# Patient Record
Sex: FEMALE | Race: BLACK OR AFRICAN AMERICAN | NOT HISPANIC OR LATINO | Employment: FULL TIME | URBAN - METROPOLITAN AREA
[De-identification: names, ages, dates, MRNs, and addresses within clinical notes are randomized per-mention and may not be internally consistent; named-entity substitution may affect disease eponyms.]

---

## 2017-02-10 RX ORDER — ZOLPIDEM TARTRATE 10 MG/1
TABLET ORAL
Qty: 21 TABLET | Refills: 0 | Status: SHIPPED | OUTPATIENT
Start: 2017-02-10 | End: 2017-03-02 | Stop reason: SDUPTHER

## 2017-02-10 RX ORDER — PROMETHAZINE HYDROCHLORIDE 25 MG/1
TABLET ORAL
Qty: 20 TABLET | Refills: 0 | Status: SHIPPED | OUTPATIENT
Start: 2017-02-10 | End: 2017-03-21 | Stop reason: SDUPTHER

## 2017-02-10 RX ORDER — AMITRIPTYLINE HYDROCHLORIDE 25 MG/1
TABLET, FILM COATED ORAL
Qty: 21 TABLET | Refills: 0 | Status: SHIPPED | OUTPATIENT
Start: 2017-02-10 | End: 2017-03-02 | Stop reason: SDUPTHER

## 2017-02-20 ENCOUNTER — LAB (OUTPATIENT)
Dept: FAMILY MEDICINE CLINIC | Facility: CLINIC | Age: 32
End: 2017-02-20

## 2017-02-20 DIAGNOSIS — Z00.00 ROUTINE GENERAL MEDICAL EXAMINATION AT A HEALTH CARE FACILITY: Primary | ICD-10-CM

## 2017-02-20 LAB
ALBUMIN SERPL-MCNC: 4.1 G/DL (ref 3.5–5.2)
ALBUMIN/GLOB SERPL: 1.2 G/DL
ALP SERPL-CCNC: 63 U/L (ref 39–117)
ALT SERPL W P-5'-P-CCNC: 32 U/L (ref 1–33)
ANION GAP SERPL CALCULATED.3IONS-SCNC: 11.4 MMOL/L
AST SERPL-CCNC: 19 U/L (ref 1–32)
BACTERIA UR QL AUTO: ABNORMAL /HPF
BILIRUB SERPL-MCNC: 0.2 MG/DL (ref 0.1–1.2)
BILIRUB UR QL STRIP: NEGATIVE
BUN BLD-MCNC: 12 MG/DL (ref 6–20)
BUN/CREAT SERPL: 13.8 (ref 7–25)
CALCIUM SPEC-SCNC: 9.3 MG/DL (ref 8.6–10.5)
CHLORIDE SERPL-SCNC: 102 MMOL/L (ref 98–107)
CHOLEST SERPL-MCNC: 168 MG/DL (ref 0–200)
CLARITY UR: CLEAR
CO2 SERPL-SCNC: 27.6 MMOL/L (ref 22–29)
COLOR UR: YELLOW
CREAT BLD-MCNC: 0.87 MG/DL (ref 0.57–1)
ERYTHROCYTE [DISTWIDTH] IN BLOOD BY AUTOMATED COUNT: 13.4 % (ref 4.5–15)
GFR SERPL CREATININE-BSD FRML MDRD: 91 ML/MIN/1.73
GLOBULIN UR ELPH-MCNC: 3.3 GM/DL
GLUCOSE BLD-MCNC: 98 MG/DL (ref 65–99)
GLUCOSE UR STRIP-MCNC: NEGATIVE MG/DL
HCT VFR BLD AUTO: 37.5 % (ref 31–42)
HDLC SERPL-MCNC: 46 MG/DL (ref 40–60)
HGB BLD-MCNC: 12 G/DL (ref 12–18)
HGB UR QL STRIP.AUTO: NEGATIVE
KETONES UR QL STRIP: NEGATIVE
LDLC SERPL CALC-MCNC: 107 MG/DL (ref 0–100)
LDLC/HDLC SERPL: 2.33 {RATIO}
LEUKOCYTE ESTERASE UR QL STRIP.AUTO: NEGATIVE
LYMPHOCYTES # BLD AUTO: 1.9 10*3/MM3 (ref 1.2–3.4)
LYMPHOCYTES NFR BLD AUTO: 27.8 % (ref 21–51)
MCH RBC QN AUTO: 28 PG (ref 26.1–33.1)
MCHC RBC AUTO-ENTMCNC: 32 G/DL (ref 33–37)
MCV RBC AUTO: 87.6 FL (ref 80–99)
MONOCYTES # BLD AUTO: 0.2 10*3/MM3 (ref 0.1–0.6)
MONOCYTES NFR BLD AUTO: 3.6 % (ref 2–9)
NEUTROPHILS # BLD AUTO: 4.6 10*3/MM3 (ref 1.4–6.5)
NEUTROPHILS NFR BLD AUTO: 68.6 % (ref 42–75)
NITRITE UR QL STRIP: NEGATIVE
PH UR STRIP.AUTO: 7 [PH] (ref 4.6–8)
PLATELET # BLD AUTO: 318 10*3/MM3 (ref 150–450)
PMV BLD AUTO: 8.1 FL (ref 7.1–10.5)
POTASSIUM BLD-SCNC: 4.6 MMOL/L (ref 3.5–5.2)
PROT SERPL-MCNC: 7.4 G/DL (ref 6–8.5)
PROT UR QL STRIP: NEGATIVE
RBC # BLD AUTO: 4.28 10*6/MM3 (ref 4–6)
RBC # UR: ABNORMAL /HPF
REF LAB TEST METHOD: ABNORMAL
SODIUM BLD-SCNC: 141 MMOL/L (ref 136–145)
SP GR UR STRIP: 1.02 (ref 1–1.03)
SQUAMOUS #/AREA URNS HPF: ABNORMAL /HPF
TRIGL SERPL-MCNC: 75 MG/DL (ref 0–150)
TSH SERPL DL<=0.05 MIU/L-ACNC: 1.43 MIU/ML (ref 0.27–4.2)
UROBILINOGEN UR QL STRIP: NORMAL
VLDLC SERPL-MCNC: 15 MG/DL (ref 5–40)
WBC NRBC COR # BLD: 6.7 10*3/MM3 (ref 4.5–10)
WBC UR QL AUTO: ABNORMAL /HPF

## 2017-02-20 PROCEDURE — 85025 COMPLETE CBC W/AUTO DIFF WBC: CPT | Performed by: NURSE PRACTITIONER

## 2017-02-20 PROCEDURE — 84443 ASSAY THYROID STIM HORMONE: CPT | Performed by: NURSE PRACTITIONER

## 2017-02-20 PROCEDURE — 81001 URINALYSIS AUTO W/SCOPE: CPT | Performed by: NURSE PRACTITIONER

## 2017-02-20 PROCEDURE — 80053 COMPREHEN METABOLIC PANEL: CPT | Performed by: NURSE PRACTITIONER

## 2017-02-20 PROCEDURE — 80061 LIPID PANEL: CPT | Performed by: NURSE PRACTITIONER

## 2017-03-02 ENCOUNTER — TELEPHONE (OUTPATIENT)
Dept: FAMILY MEDICINE CLINIC | Facility: CLINIC | Age: 32
End: 2017-03-02

## 2017-03-02 RX ORDER — AMITRIPTYLINE HYDROCHLORIDE 25 MG/1
TABLET, FILM COATED ORAL
Qty: 21 TABLET | Refills: 0 | Status: SHIPPED | OUTPATIENT
Start: 2017-03-02 | End: 2017-03-21 | Stop reason: SDUPTHER

## 2017-03-02 RX ORDER — ZOLPIDEM TARTRATE 10 MG/1
TABLET ORAL
Qty: 21 TABLET | Refills: 0 | Status: SHIPPED | OUTPATIENT
Start: 2017-03-02 | End: 2017-03-21 | Stop reason: SDUPTHER

## 2017-03-21 ENCOUNTER — OFFICE VISIT (OUTPATIENT)
Dept: FAMILY MEDICINE CLINIC | Facility: CLINIC | Age: 32
End: 2017-03-21

## 2017-03-21 VITALS
OXYGEN SATURATION: 99 % | SYSTOLIC BLOOD PRESSURE: 122 MMHG | RESPIRATION RATE: 18 BRPM | HEIGHT: 67 IN | BODY MASS INDEX: 29.91 KG/M2 | WEIGHT: 190.6 LBS | DIASTOLIC BLOOD PRESSURE: 72 MMHG | HEART RATE: 90 BPM | TEMPERATURE: 98.4 F

## 2017-03-21 DIAGNOSIS — K59.04 CHRONIC IDIOPATHIC CONSTIPATION: Primary | ICD-10-CM

## 2017-03-21 DIAGNOSIS — E78.5 DYSLIPIDEMIA: ICD-10-CM

## 2017-03-21 DIAGNOSIS — K25.7 CHRONIC GASTRIC ULCER: ICD-10-CM

## 2017-03-21 DIAGNOSIS — R56.9 SEIZURES (HCC): ICD-10-CM

## 2017-03-21 PROCEDURE — 99213 OFFICE O/P EST LOW 20 MIN: CPT | Performed by: INTERNAL MEDICINE

## 2017-03-21 RX ORDER — ZOLPIDEM TARTRATE 10 MG/1
10 TABLET ORAL NIGHTLY PRN
Qty: 30 TABLET | Refills: 2 | Status: SHIPPED | OUTPATIENT
Start: 2017-03-21 | End: 2017-09-25 | Stop reason: SDUPTHER

## 2017-03-21 RX ORDER — AMITRIPTYLINE HYDROCHLORIDE 25 MG/1
25 TABLET, FILM COATED ORAL NIGHTLY
Qty: 30 TABLET | Refills: 2 | Status: SHIPPED | OUTPATIENT
Start: 2017-03-21 | End: 2017-06-27 | Stop reason: SDUPTHER

## 2017-03-21 RX ORDER — PROMETHAZINE HYDROCHLORIDE 25 MG/1
25 TABLET ORAL EVERY 8 HOURS PRN
Qty: 20 TABLET | Refills: 2 | Status: SHIPPED | OUTPATIENT
Start: 2017-03-21 | End: 2017-07-23 | Stop reason: SDUPTHER

## 2017-03-21 RX ORDER — PANTOPRAZOLE SODIUM 40 MG/1
40 TABLET, DELAYED RELEASE ORAL DAILY
Qty: 30 TABLET | Refills: 3 | Status: SHIPPED | OUTPATIENT
Start: 2017-03-21 | End: 2017-07-23 | Stop reason: SDUPTHER

## 2017-03-21 RX ORDER — LUBIPROSTONE 24 UG/1
24 CAPSULE ORAL 2 TIMES DAILY WITH MEALS
Qty: 30 CAPSULE | Refills: 3 | Status: SHIPPED | OUTPATIENT
Start: 2017-03-21 | End: 2017-08-08

## 2017-03-21 NOTE — PROGRESS NOTES
Subjective   Liya Parker is a 31 y.o. female.     History of Present Illness   Patient was seen today for chronic constipation.  She was placed on amikacin asked to follow-up in 2 weeks.    Much of this encounter note is an electronic transcription/translation of spoken language to printed text.  The electronic translation of spoken language may permit erroneous, or at times, nonsensical words or phrases to be inadvertently transcribed.  Although I have reviewed the note for such errors, some may still exist.  The following portions of the patient's history were reviewed and updated as appropriate: allergies, current medications, past family history, past medical history, past social history, past surgical history and problem list.    Review of Systems   Constitutional: Negative for fatigue and fever.   HENT: Positive for congestion. Negative for trouble swallowing.    Eyes: Negative for discharge and visual disturbance.   Respiratory: Negative for choking and shortness of breath.    Cardiovascular: Negative for chest pain and palpitations.   Gastrointestinal: Positive for constipation. Negative for abdominal pain and blood in stool.   Endocrine: Negative.    Genitourinary: Negative for genital sores and hematuria.   Musculoskeletal: Negative for gait problem and joint swelling.   Skin: Negative for color change, pallor, rash and wound.   Allergic/Immunologic: Positive for environmental allergies. Negative for immunocompromised state.   Neurological: Negative for facial asymmetry and speech difficulty.   Psychiatric/Behavioral: Negative for hallucinations and suicidal ideas.       Objective   Physical Exam   Constitutional: She is oriented to person, place, and time. She appears well-developed and well-nourished.   HENT:   Head: Normocephalic.   Eyes: Conjunctivae are normal. Pupils are equal, round, and reactive to light.   Neck: Normal range of motion. Neck supple.   Cardiovascular: Normal rate, regular rhythm  and normal heart sounds.    Pulmonary/Chest: Effort normal and breath sounds normal.   Abdominal: Soft. Bowel sounds are normal. She exhibits no distension and no mass. There is no tenderness. There is no rebound and no guarding. No hernia.   Musculoskeletal: Normal range of motion.   Neurological: She is alert and oriented to person, place, and time.   Skin: Skin is warm and dry.   Psychiatric: She has a normal mood and affect. Her behavior is normal. Judgment and thought content normal.   Nursing note and vitals reviewed.      Assessment/Plan   Problems Addressed this Visit        Digestive    Gastric ulcer    Relevant Medications    pantoprazole (PROTONIX) 40 MG EC tablet       Nervous and Auditory    Seizures       Other    Dyslipidemia      Other Visit Diagnoses     Chronic idiopathic constipation    -  Primary

## 2017-05-04 RX ORDER — ERGOCALCIFEROL 1.25 MG/1
CAPSULE ORAL
Qty: 16 CAPSULE | Refills: 0 | OUTPATIENT
Start: 2017-05-04

## 2017-06-28 RX ORDER — AMITRIPTYLINE HYDROCHLORIDE 25 MG/1
TABLET, FILM COATED ORAL
Qty: 30 TABLET | Refills: 0 | Status: SHIPPED | OUTPATIENT
Start: 2017-06-28 | End: 2017-07-23 | Stop reason: SDUPTHER

## 2017-07-12 ENCOUNTER — HOSPITAL ENCOUNTER (EMERGENCY)
Facility: HOSPITAL | Age: 32
Discharge: HOME | End: 2017-07-12
Attending: EMERGENCY MEDICINE | Admitting: EMERGENCY MEDICINE

## 2017-07-12 ENCOUNTER — APPOINTMENT (OUTPATIENT)
Dept: GENERAL RADIOLOGY | Facility: HOSPITAL | Age: 32
End: 2017-07-12

## 2017-07-12 ENCOUNTER — APPOINTMENT (OUTPATIENT)
Dept: CT IMAGING | Facility: HOSPITAL | Age: 32
End: 2017-07-12

## 2017-07-12 ENCOUNTER — OFFICE VISIT (OUTPATIENT)
Dept: FAMILY MEDICINE CLINIC | Facility: CLINIC | Age: 32
End: 2017-07-12

## 2017-07-12 VITALS
OXYGEN SATURATION: 98 % | TEMPERATURE: 97.3 F | WEIGHT: 187 LBS | HEIGHT: 67 IN | DIASTOLIC BLOOD PRESSURE: 77 MMHG | SYSTOLIC BLOOD PRESSURE: 132 MMHG | RESPIRATION RATE: 16 BRPM | HEART RATE: 73 BPM | BODY MASS INDEX: 29.35 KG/M2

## 2017-07-12 VITALS
DIASTOLIC BLOOD PRESSURE: 70 MMHG | RESPIRATION RATE: 16 BRPM | HEART RATE: 101 BPM | SYSTOLIC BLOOD PRESSURE: 100 MMHG | OXYGEN SATURATION: 97 % | WEIGHT: 187.4 LBS | TEMPERATURE: 99.3 F | BODY MASS INDEX: 29.41 KG/M2 | HEIGHT: 67 IN

## 2017-07-12 DIAGNOSIS — G43.001 MIGRAINE WITHOUT AURA AND WITH STATUS MIGRAINOSUS, NOT INTRACTABLE: ICD-10-CM

## 2017-07-12 DIAGNOSIS — R56.9 SEIZURE (HCC): ICD-10-CM

## 2017-07-12 DIAGNOSIS — M54.2 NECK PAIN: ICD-10-CM

## 2017-07-12 DIAGNOSIS — S46.912A LEFT SHOULDER STRAIN, INITIAL ENCOUNTER: ICD-10-CM

## 2017-07-12 DIAGNOSIS — R56.9 SEIZURES (HCC): Primary | ICD-10-CM

## 2017-07-12 DIAGNOSIS — G43.009 MIGRAINE WITHOUT AURA AND WITHOUT STATUS MIGRAINOSUS, NOT INTRACTABLE: Primary | ICD-10-CM

## 2017-07-12 DIAGNOSIS — M25.512 ACUTE PAIN OF LEFT SHOULDER: ICD-10-CM

## 2017-07-12 DIAGNOSIS — S16.1XXA CERVICAL STRAIN, INITIAL ENCOUNTER: ICD-10-CM

## 2017-07-12 LAB
ALBUMIN SERPL-MCNC: 4.2 G/DL (ref 3.5–5.2)
ALBUMIN/GLOB SERPL: 1.2 G/DL
ALP SERPL-CCNC: 66 U/L (ref 39–117)
ALT SERPL W P-5'-P-CCNC: 40 U/L (ref 1–33)
ANION GAP SERPL CALCULATED.3IONS-SCNC: 10.2 MMOL/L
AST SERPL-CCNC: 24 U/L (ref 1–32)
BASOPHILS # BLD AUTO: 0.01 10*3/MM3 (ref 0–0.2)
BASOPHILS NFR BLD AUTO: 0.2 % (ref 0–1.5)
BILIRUB SERPL-MCNC: 0.2 MG/DL (ref 0.1–1.2)
BUN BLD-MCNC: 10 MG/DL (ref 6–20)
BUN/CREAT SERPL: 12.8 (ref 7–25)
CALCIUM SPEC-SCNC: 9.4 MG/DL (ref 8.6–10.5)
CHLORIDE SERPL-SCNC: 107 MMOL/L (ref 98–107)
CO2 SERPL-SCNC: 24.8 MMOL/L (ref 22–29)
CREAT BLD-MCNC: 0.78 MG/DL (ref 0.57–1)
DEPRECATED RDW RBC AUTO: 43.9 FL (ref 37–54)
EOSINOPHIL # BLD AUTO: 0.16 10*3/MM3 (ref 0–0.7)
EOSINOPHIL NFR BLD AUTO: 2.4 % (ref 0.3–6.2)
ERYTHROCYTE [DISTWIDTH] IN BLOOD BY AUTOMATED COUNT: 13.8 % (ref 11.7–13)
GFR SERPL CREATININE-BSD FRML MDRD: 103 ML/MIN/1.73
GLOBULIN UR ELPH-MCNC: 3.4 GM/DL
GLUCOSE BLD-MCNC: 88 MG/DL (ref 65–99)
HCT VFR BLD AUTO: 37.7 % (ref 35.6–45.5)
HGB BLD-MCNC: 12.5 G/DL (ref 11.9–15.5)
IMM GRANULOCYTES # BLD: 0.03 10*3/MM3 (ref 0–0.03)
IMM GRANULOCYTES NFR BLD: 0.5 % (ref 0–0.5)
LYMPHOCYTES # BLD AUTO: 1.87 10*3/MM3 (ref 0.9–4.8)
LYMPHOCYTES NFR BLD AUTO: 28.4 % (ref 19.6–45.3)
MAGNESIUM SERPL-MCNC: 2.4 MG/DL (ref 1.6–2.6)
MCH RBC QN AUTO: 28.8 PG (ref 26.9–32)
MCHC RBC AUTO-ENTMCNC: 33.2 G/DL (ref 32.4–36.3)
MCV RBC AUTO: 86.9 FL (ref 80.5–98.2)
MONOCYTES # BLD AUTO: 0.36 10*3/MM3 (ref 0.2–1.2)
MONOCYTES NFR BLD AUTO: 5.5 % (ref 5–12)
NEUTROPHILS # BLD AUTO: 4.15 10*3/MM3 (ref 1.9–8.1)
NEUTROPHILS NFR BLD AUTO: 63 % (ref 42.7–76)
PLATELET # BLD AUTO: 350 10*3/MM3 (ref 140–500)
PMV BLD AUTO: 10 FL (ref 6–12)
POTASSIUM BLD-SCNC: 4.1 MMOL/L (ref 3.5–5.2)
PROT SERPL-MCNC: 7.6 G/DL (ref 6–8.5)
RBC # BLD AUTO: 4.34 10*6/MM3 (ref 3.9–5.2)
SODIUM BLD-SCNC: 142 MMOL/L (ref 136–145)
WBC NRBC COR # BLD: 6.58 10*3/MM3 (ref 4.5–10.7)

## 2017-07-12 PROCEDURE — 70450 CT HEAD/BRAIN W/O DYE: CPT

## 2017-07-12 PROCEDURE — 25010000002 KETOROLAC TROMETHAMINE PER 15 MG: Performed by: EMERGENCY MEDICINE

## 2017-07-12 PROCEDURE — 73030 X-RAY EXAM OF SHOULDER: CPT

## 2017-07-12 PROCEDURE — 96374 THER/PROPH/DIAG INJ IV PUSH: CPT

## 2017-07-12 PROCEDURE — 83735 ASSAY OF MAGNESIUM: CPT | Performed by: EMERGENCY MEDICINE

## 2017-07-12 PROCEDURE — 96375 TX/PRO/DX INJ NEW DRUG ADDON: CPT

## 2017-07-12 PROCEDURE — 25010000002 PROCHLORPERAZINE EDISYLATE PER 10 MG: Performed by: EMERGENCY MEDICINE

## 2017-07-12 PROCEDURE — 99213 OFFICE O/P EST LOW 20 MIN: CPT | Performed by: NURSE PRACTITIONER

## 2017-07-12 PROCEDURE — 85025 COMPLETE CBC W/AUTO DIFF WBC: CPT | Performed by: EMERGENCY MEDICINE

## 2017-07-12 PROCEDURE — 72125 CT NECK SPINE W/O DYE: CPT

## 2017-07-12 PROCEDURE — 99284 EMERGENCY DEPT VISIT MOD MDM: CPT

## 2017-07-12 PROCEDURE — 80053 COMPREHEN METABOLIC PANEL: CPT | Performed by: EMERGENCY MEDICINE

## 2017-07-12 RX ORDER — KETOROLAC TROMETHAMINE 30 MG/ML
30 INJECTION, SOLUTION INTRAMUSCULAR; INTRAVENOUS ONCE
Status: COMPLETED | OUTPATIENT
Start: 2017-07-12 | End: 2017-07-12

## 2017-07-12 RX ORDER — SODIUM CHLORIDE 0.9 % (FLUSH) 0.9 %
10 SYRINGE (ML) INJECTION AS NEEDED
Status: DISCONTINUED | OUTPATIENT
Start: 2017-07-12 | End: 2017-07-12 | Stop reason: HOSPADM

## 2017-07-12 RX ORDER — ESCITALOPRAM OXALATE 20 MG/1
TABLET ORAL
COMMUNITY
Start: 2017-07-07 | End: 2017-08-08

## 2017-07-12 RX ORDER — SUMATRIPTAN 100 MG/1
TABLET, FILM COATED ORAL
COMMUNITY
Start: 2017-04-11 | End: 2017-08-08

## 2017-07-12 RX ORDER — LEVETIRACETAM 1000 MG/1
1000 TABLET ORAL 2 TIMES DAILY
Qty: 60 TABLET | Refills: 0 | Status: SHIPPED | OUTPATIENT
Start: 2017-07-12 | End: 2017-08-08 | Stop reason: SDUPTHER

## 2017-07-12 RX ORDER — BUTALBITAL, ACETAMINOPHEN AND CAFFEINE 50; 325; 40 MG/1; MG/1; MG/1
1 TABLET ORAL EVERY 6 HOURS PRN
Qty: 20 TABLET | Refills: 0 | Status: SHIPPED | OUTPATIENT
Start: 2017-07-12 | End: 2017-07-24 | Stop reason: SDUPTHER

## 2017-07-12 RX ADMIN — KETOROLAC TROMETHAMINE 30 MG: 30 INJECTION, SOLUTION INTRAMUSCULAR at 13:49

## 2017-07-12 RX ADMIN — PROCHLORPERAZINE EDISYLATE 10 MG: 5 INJECTION INTRAMUSCULAR; INTRAVENOUS at 13:49

## 2017-07-12 NOTE — PROGRESS NOTES
Subjective   Liya Parker is a 31 y.o. female.     History of Present Illness   C/o migraines, she has neck and shoulder pain, leg pain, she states she wakes up sweating, she thinks she had a seizure a couple of nights ago, she woke up and her tongue as bleeding, but states she has never had a seizure in her sleep before, she has been agitated and had panic attacks, she states when she gets up the room is spinning, she states she took pain medication at home form wisdom tooth surgery which helped HA but then came back. She takes imitrex and keppra but not helping HA, she tried percocet at home but didn't help long. She sees Dr. Peoples, neurology, last saw about 10 months ago, she is unsure if she has had an MRI of brain. She had EEG about 1 year ago which she states showed seizure activity, she has been working a lot lately, she state she woke up confused and lethargic the morning she woke up with bleeding tongue, she did not go to the ER she went back to sleep. She states seizures started about 4 years ago, has fam hx in father. She states her headache started about 2 weeks ago, she has little appetite, she is photophobic. She called into work for 2 days. She has tried topamax for headache before but did not help. She drove herself here today. She has had a fever lately. She tried tylenol at home, she can't take naprosyn as she has a stomach ulcer. She states she has been taking keppra without missed doses.     The following portions of the patient's history were reviewed and updated as appropriate: allergies, current medications, past family history, past medical history, past social history, past surgical history and problem list.    Review of Systems   Constitutional: Positive for diaphoresis, fatigue and fever. Negative for chills and unexpected weight change.   Eyes: Positive for photophobia. Negative for pain and visual disturbance.   Respiratory: Negative for shortness of breath.    Cardiovascular:  Negative for chest pain.   Musculoskeletal: Positive for arthralgias, myalgias and neck pain.   Skin: Negative for pallor.   Neurological: Positive for seizures and headaches. Negative for dizziness, syncope, speech difficulty, weakness and light-headedness.   Psychiatric/Behavioral: Negative for behavioral problems and confusion. The patient is not nervous/anxious.    All other systems reviewed and are negative.      Objective   Physical Exam   Constitutional: She is oriented to person, place, and time. She appears well-developed and well-nourished.   Appears tired.      HENT:   Head: Normocephalic.   Right Ear: Tympanic membrane and ear canal normal.   Left Ear: Tympanic membrane and ear canal normal.   Eyes: Conjunctivae and EOM are normal. Pupils are equal, round, and reactive to light.   Neck: Normal range of motion and full passive range of motion without pain. Neck supple. Muscular tenderness present.   Cardiovascular: Normal rate, regular rhythm, normal heart sounds and intact distal pulses.    Pulmonary/Chest: Effort normal and breath sounds normal.   Musculoskeletal: Normal range of motion.        Right shoulder: Normal.        Left shoulder: She exhibits tenderness. She exhibits normal range of motion, no bony tenderness, no swelling, no pain, no spasm, normal pulse and normal strength.   Lymphadenopathy:     She has no cervical adenopathy.   Neurological: She is alert and oriented to person, place, and time. She has normal strength. No cranial nerve deficit or sensory deficit. She displays a negative Romberg sign.   Skin: Skin is warm and dry.   Psychiatric: She has a normal mood and affect. Her behavior is normal. Judgment and thought content normal.   Nursing note and vitals reviewed.      Assessment/Plan   Liya was seen today for migraine.    Diagnoses and all orders for this visit:    Seizures  -     Cancel: MRI Brain With & Without Contrast; Future  -     Cancel: MRI Cervical Spine With & Without  Contrast; Future  -     Ambulatory Referral to Neurology    Migraine without aura and with status migrainosus, not intractable  -     Cancel: MRI Brain With & Without Contrast; Future  -     Cancel: MRI Cervical Spine With & Without Contrast; Future  -     Ambulatory Referral to Neurology    Acute pain of left shoulder    Neck pain      Attempted to call neurology offices x3 locations, unable to get patient in to be seen, referral placed for neurology referral and will call with appt.  Recommended patient go to the ER at this time for headache and possible seizures, and for imaging of brain. Report called to [facility] ER. She will f/u in office next week.  Increase fluid intake, get plenty of rest.   Patient agrees with plan of care and understands instructions. Call if worsening symptoms or any problems or concerns.

## 2017-07-12 NOTE — PATIENT INSTRUCTIONS
Attempted to call neurology offices x3 locations, unable to get patient in to be seen, referral placed for neurology referral and will call with appt.  Recommended patient go to the ER at this time for headache and possible seizures, and for imaging of brain. Report called to [facility] ER. She will f/u in office next week.  Increase fluid intake, get plenty of rest.   Patient agrees with plan of care and understands instructions. Call if worsening symptoms or any problems or concerns.

## 2017-07-12 NOTE — ED PROVIDER NOTES
" EMERGENCY DEPARTMENT ENCOUNTER    CHIEF COMPLAINT  Chief Complaint: HA  History given by: Patient  History limited by: Nothing  Room Number: 22/22  PMD: Dionisio Gallegos MD      HPI:  Pt is a 31 y.o. female who presents complaining of intermittent HA onset 2 weeks ago. The pt states she had a seizure 2 days ago. The pt has been on Keppra for 2 years and has not missed a dose. Pt reports insomnia, neck pain, left shoulder pain, nausea, and intermittent BLE numbness and pain. Pt denies CP, SOA, and vomiting. Seen at PCP office today and sent to the ED for evaluation.     Duration: 2 weeks  Onset: Gradual  Timing: Intermittent  Location: Head  Radiation: None  Quality: \"Pain\"  Intensity/Severity: Moderate  Progression: Unchanged  Associated Symptoms: Insomnia, neck pain, left shoulder pain, nausea, intermittent BLE numbness and pain  Aggravating Factors: Nothing  Alleviating Factors: Nothing  Previous Episodes: None  Treatment before arrival: Keppra    PAST MEDICAL HISTORY  Active Ambulatory Problems     Diagnosis Date Noted   • Seizures    • Pancreatitis    • Gastric ulcer    • Dyslipidemia 03/18/2016   • Environmental allergies 03/18/2016     Resolved Ambulatory Problems     Diagnosis Date Noted   • No Resolved Ambulatory Problems     Past Medical History:   Diagnosis Date   • Gastric ulcer    • Pancreatitis    • Seizures        PAST SURGICAL HISTORY  History reviewed. No pertinent surgical history.    FAMILY HISTORY  Family History   Problem Relation Age of Onset   • Diabetes Mother    • Hypertension Mother    • Hypertension Father    • Diabetes Father    • Seizures Father        SOCIAL HISTORY  Social History     Social History   • Marital status: Single     Spouse name: N/A   • Number of children: N/A   • Years of education: N/A     Occupational History   • Not on file.     Social History Main Topics   • Smoking status: Light Tobacco Smoker   • Smokeless tobacco: Not on file   • Alcohol use Yes      Comment: " OCCASIONAL   • Drug use: No   • Sexual activity: Not on file     Other Topics Concern   • Not on file     Social History Narrative       ALLERGIES  Review of patient's allergies indicates no known allergies.    REVIEW OF SYSTEMS  Review of Systems   Constitutional: Negative for chills and fever.   HENT: Negative for sore throat and trouble swallowing.    Eyes: Negative for visual disturbance.   Respiratory: Negative for cough and shortness of breath.    Cardiovascular: Negative for chest pain, palpitations and leg swelling.   Gastrointestinal: Positive for nausea. Negative for abdominal pain, diarrhea and vomiting.   Endocrine: Negative.    Genitourinary: Negative for decreased urine volume, dysuria and frequency.   Musculoskeletal: Positive for neck pain.        Left shoulder pain  Intermittent BLE pain   Skin: Negative for rash.   Allergic/Immunologic: Negative.    Neurological: Positive for numbness (Intermittent BLE ) and headaches. Negative for syncope and weakness.   Hematological: Negative.    Psychiatric/Behavioral: Positive for sleep disturbance (Insomnia).   All other systems reviewed and are negative.      PHYSICAL EXAM  ED Triage Vitals   Temp Heart Rate Resp BP SpO2   07/12/17 1242 07/12/17 1242 07/12/17 1242 07/12/17 1248 07/12/17 1242   98.4 °F (36.9 °C) 97 16 125/94 100 %      Temp src Heart Rate Source Patient Position BP Location FiO2 (%)   07/12/17 1242 -- 07/12/17 1248 -- --   Tympanic  Sitting         Physical Exam   Constitutional: She is oriented to person, place, and time and well-developed, well-nourished, and in no distress. No distress.   HENT:   Head: Normocephalic and atraumatic.   The pt has no bite marks to the tongue.   Eyes: EOM are normal. Pupils are equal, round, and reactive to light.   Neck: Normal range of motion. Neck supple.   The pt has posterior neck tenderness with no step off.   Cardiovascular: Normal rate, regular rhythm and normal heart sounds.    Pulmonary/Chest: Effort  normal and breath sounds normal. No respiratory distress.   Abdominal: Soft. There is no tenderness. There is no rebound and no guarding.   Musculoskeletal: Normal range of motion. She exhibits tenderness (Left shoulder). She exhibits no edema.   Neurological: She is alert and oriented to person, place, and time. She has normal sensation and normal strength.   Skin: Skin is warm and dry. No rash noted.   Psychiatric: Mood and affect normal.   Nursing note and vitals reviewed.      LAB RESULTS  Lab Results (last 24 hours)     Procedure Component Value Units Date/Time    CBC & Differential [[id number]] Collected:  07/12/17 1348    Specimen:  Blood Updated:  07/12/17 1404    Narrative:       The following orders were created for panel order CBC & Differential.  Procedure                               Abnormality         Status                     ---------                               -----------         ------                     CBC Auto Differential[[id number]]        Abnormal            Final result                 Please view results for these tests on the individual orders.    Comprehensive Metabolic Panel [[id number]]  (Abnormal) Collected:  07/12/17 1348    Specimen:  Blood Updated:  07/12/17 1429     Glucose 88 mg/dL      BUN 10 mg/dL      Creatinine 0.78 mg/dL      Sodium 142 mmol/L      Potassium 4.1 mmol/L      Chloride 107 mmol/L      CO2 24.8 mmol/L      Calcium 9.4 mg/dL      Total Protein 7.6 g/dL      Albumin 4.20 g/dL      ALT (SGPT) 40 (H) U/L      AST (SGOT) 24 U/L      Alkaline Phosphatase 66 U/L      Total Bilirubin 0.2 mg/dL      eGFR  African Amer 103 mL/min/1.73      Globulin 3.4 gm/dL      A/G Ratio 1.2 g/dL      BUN/Creatinine Ratio 12.8     Anion Gap 10.2 mmol/L     Magnesium [[id number]]  (Normal) Collected:  07/12/17 1348    Specimen:  Blood Updated:  07/12/17 1429     Magnesium 2.4 mg/dL     CBC Auto Differential [[id number]]  (Abnormal) Collected:  07/12/17 1348    Specimen:  Blood Updated:   07/12/17 1404     WBC 6.58 10*3/mm3      RBC 4.34 10*6/mm3      Hemoglobin 12.5 g/dL      Hematocrit 37.7 %      MCV 86.9 fL      MCH 28.8 pg      MCHC 33.2 g/dL      RDW 13.8 (H) %      RDW-SD 43.9 fl      MPV 10.0 fL      Platelets 350 10*3/mm3      Neutrophil % 63.0 %      Lymphocyte % 28.4 %      Monocyte % 5.5 %      Eosinophil % 2.4 %      Basophil % 0.2 %      Immature Grans % 0.5 %      Neutrophils, Absolute 4.15 10*3/mm3      Lymphocytes, Absolute 1.87 10*3/mm3      Monocytes, Absolute 0.36 10*3/mm3      Eosinophils, Absolute 0.16 10*3/mm3      Basophils, Absolute 0.01 10*3/mm3      Immature Grans, Absolute 0.03 10*3/mm3           I ordered the above labs and reviewed the results    RADIOLOGY  CT Head Without Contrast - Negative acute   CT Cervical Spine Without Contrast - Negative acute   XR Shoulder 2+ View Left - Negative acute        I ordered the above noted radiological studies. Interpreted by radiologist. Discussed with radiologist (Dr. Arguello). Reviewed by me in PACS.       PROCEDURES  Procedures      PROGRESS AND CONSULTS  ED Course     1326  Labs, XR Left Shoulder, and CT C-Spine ordered for further evaluation. Compazine and Toradol ordered.    1440  BP- 124/78 HR- 83 Temp- 98.4 °F (36.9 °C) (Tympanic) O2 sat- 100%    Rechecked pt, she is resting comfortably. Discussed the negative lab and radiology results with the pt. Discussed the plan to discharge the pt with the plan to double her dose of Keppra until she f/u with a neurologist. I advised the pt to f/u with a neurologist. The pt understands and agrees with the plan.    MEDICAL DECISION MAKING  Results were reviewed/discussed with the patient and they were also made aware of online access. Pt also made aware that some labs, such as cultures, will not be resulted during ER visit and follow up with PMD is necessary.     MDM  Number of Diagnoses or Management Options     Amount and/or Complexity of Data Reviewed  Clinical lab tests: ordered and  reviewed (Unremarkable)  Tests in the radiology section of CPT®: ordered and reviewed (CT Head Without Contrast - Negative acute  CT Cervical Spine Without Contrast - Negative acute  XR Shoulder 2+ View Left - Negative acute  )  Discussion of test results with the performing providers: yes (Dr. Arguello)  Discuss the patient with other providers: yes    Patient Progress  Patient progress: stable         DIAGNOSIS  Final diagnoses:   Migraine without aura and without status migrainosus, not intractable   Seizure   Cervical strain, initial encounter   Left shoulder strain, initial encounter       DISPOSITION  DISCHARGE    Patient discharged in stable condition.    Reviewed implications of results, diagnosis, meds, responsibility to follow up, warning signs and symptoms of possible worsening, potential complications and reasons to return to ER.    Patient/Family voiced understanding of above instructions.    Discussed plan for discharge, as there is no emergent indication for admission.  Pt/family is agreeable and understands need for follow up and repeat testing.  Pt is aware that discharge does not mean that nothing is wrong but it indicates no emergency is present that requires admission and they must continue care with follow-up as given below or physician of their choice.     FOLLOW-UP  Dionisio Gallegos Jr., MD  [address] [place], [place] [id number] [phone]    Schedule an appointment as soon as possible for a visit  follow for Neurology referral         Medication List      New Prescriptions          butalbital-acetaminophen-caffeine -40 MG per tablet   Commonly known as:  FIORICET, ESGIC   Take 1 tablet by mouth Every 6 (Six) Hours As Needed for Headache.         Changed          levETIRAcetam 1000 MG tablet   Commonly known as:  KEPPRA   Take 1 tablet by mouth 2 (Two) Times a Day.   What changed:    - medication strength  - how much to take               Latest Documented Vital Signs:  As of 2:45  PM  BP- 124/78 HR- 83 Temp- 98.4 °F (36.9 °C) (Tympanic) O2 sat- 100%    --  Documentation assistance provided by lynn Chakraborty for Dr. Montero.  Information recorded by the scribe was done at my direction and has been verified and validated by me.     Hunter Chakraborty  07/12/17 7251       Xavi Montero MD  07/12/17 8008

## 2017-07-13 ENCOUNTER — TELEPHONE (OUTPATIENT)
Dept: SOCIAL WORK | Facility: HOSPITAL | Age: 32
End: 2017-07-13

## 2017-07-24 ENCOUNTER — TELEPHONE (OUTPATIENT)
Dept: FAMILY MEDICINE CLINIC | Facility: CLINIC | Age: 32
End: 2017-07-24

## 2017-07-24 ENCOUNTER — OFFICE VISIT (OUTPATIENT)
Dept: FAMILY MEDICINE CLINIC | Facility: CLINIC | Age: 32
End: 2017-07-24

## 2017-07-24 VITALS
SYSTOLIC BLOOD PRESSURE: 100 MMHG | WEIGHT: 181.2 LBS | OXYGEN SATURATION: 99 % | HEIGHT: 67 IN | BODY MASS INDEX: 28.44 KG/M2 | TEMPERATURE: 98 F | DIASTOLIC BLOOD PRESSURE: 74 MMHG | RESPIRATION RATE: 16 BRPM | HEART RATE: 84 BPM

## 2017-07-24 DIAGNOSIS — G43.809 OTHER MIGRAINE WITHOUT STATUS MIGRAINOSUS, NOT INTRACTABLE: ICD-10-CM

## 2017-07-24 DIAGNOSIS — R56.9 SEIZURES (HCC): Primary | ICD-10-CM

## 2017-07-24 PROCEDURE — 99213 OFFICE O/P EST LOW 20 MIN: CPT | Performed by: NURSE PRACTITIONER

## 2017-07-24 RX ORDER — ONDANSETRON 4 MG/1
4 TABLET, FILM COATED ORAL EVERY 8 HOURS PRN
Qty: 20 TABLET | Refills: 0 | Status: SHIPPED | OUTPATIENT
Start: 2017-07-24 | End: 2019-03-01 | Stop reason: SDUPTHER

## 2017-07-24 RX ORDER — BUTALBITAL, ACETAMINOPHEN AND CAFFEINE 50; 325; 40 MG/1; MG/1; MG/1
1 TABLET ORAL EVERY 6 HOURS PRN
Qty: 20 TABLET | Refills: 0 | OUTPATIENT
Start: 2017-07-24 | End: 2017-08-08

## 2017-07-24 RX ORDER — PROMETHAZINE HYDROCHLORIDE 25 MG/1
TABLET ORAL
Qty: 20 TABLET | Refills: 0 | Status: SHIPPED | OUTPATIENT
Start: 2017-07-24 | End: 2017-11-09 | Stop reason: SDUPTHER

## 2017-07-24 RX ORDER — AMITRIPTYLINE HYDROCHLORIDE 25 MG/1
TABLET, FILM COATED ORAL
Qty: 30 TABLET | Refills: 0 | Status: SHIPPED | OUTPATIENT
Start: 2017-07-24 | End: 2017-08-21 | Stop reason: SDUPTHER

## 2017-07-24 RX ORDER — PANTOPRAZOLE SODIUM 40 MG/1
TABLET, DELAYED RELEASE ORAL
Qty: 30 TABLET | Refills: 0 | Status: SHIPPED | OUTPATIENT
Start: 2017-07-24 | End: 2017-08-21 | Stop reason: SDUPTHER

## 2017-07-24 NOTE — PATIENT INSTRUCTIONS
Cont neurology appt on 8/8/17.  MRI brain will call with appt. Will defer EEG to neurology.   Trial zofran 4mg every 8 hours as needed for nausea or vomiting. Hold phenergan while taking zofran.   Encourage bland diet, advance as tolerated.  Cont medications as prescribed.   F/u in 1 month with Dr. Gao.   Increase fluid intake, get plenty of rest.   If any seizure activity or prolonged migraine, advised to go to the ER.   FMLA paperwork completed.   Patient agrees with plan of care and understands instructions. Call if worsening symptoms or any problems or concerns.

## 2017-07-24 NOTE — PROGRESS NOTES
"Subjective   Liya Parker is a 31 y.o. female.     History of Present Illness   Here to f/u for seizures, was seen on 7/12/17 for seizures, sent to ER for seizures, unable to get into neurologist, increased keppra to 2000mg 2x day. She was also given fioricet, taking every 6 hours, which helps. She has had some headaches, had 1 yesterday, migraine, gets nauseated, she states she feel at home yesterday, hit knee, did not pass out, she she states she got dizzy. She had to sit down. She states she felt like the room was spinning. She did not take phenergan, she has a hx of ulcer, she states she has not had an appetite. CT of head, neck and left shoulder in ER, all negative. She states last MRI of brain unknown, EEG last year that showed seizure activity, per patient. She states she has been agitated and \"mean\" lately. She states she is depressed, denies suicidal ideations, she states she has been mean since her last seizure a couple of weeks ago. She states she has been \"mean\" before she started Fioricet.   She also brought in [employer] paperwork, she is a surgical tech in labor and delivery, [employer].   The following portions of the patient's history were reviewed and updated as appropriate: allergies, current medications, past family history, past medical history, past social history, past surgical history and problem list.    Review of Systems   Constitutional: Positive for appetite change and fatigue. Negative for chills, diaphoresis and fever.   Respiratory: Negative for shortness of breath.    Cardiovascular: Negative for chest pain.   Musculoskeletal: Positive for neck pain. Negative for arthralgias, myalgias and neck stiffness.   Skin: Negative for pallor.   Neurological: Positive for headaches. Negative for dizziness, tremors, seizures, syncope, speech difficulty, weakness, light-headedness and numbness.   Psychiatric/Behavioral: Negative for behavioral problems, confusion, sleep disturbance and suicidal " ideas. The patient is not nervous/anxious.    All other systems reviewed and are negative.      Objective   Physical Exam   Constitutional: She is oriented to person, place, and time. She appears well-developed and well-nourished.   HENT:   Head: Normocephalic.   Eyes: Conjunctivae and EOM are normal. Pupils are equal, round, and reactive to light.   Neck: Normal range of motion. Neck supple.   Cardiovascular: Normal rate, regular rhythm and normal heart sounds.    Pulmonary/Chest: Effort normal and breath sounds normal.   Musculoskeletal: Normal range of motion.   Lymphadenopathy:     She has no cervical adenopathy.   Neurological: She is alert and oriented to person, place, and time. She has normal strength. No cranial nerve deficit or sensory deficit. She displays a negative Romberg sign.   Skin: Skin is warm and dry.   Psychiatric: She has a normal mood and affect. Her behavior is normal. Judgment and thought content normal.   Nursing note and vitals reviewed.      Assessment/Plan   Liya was seen today for follow-up.    Diagnoses and all orders for this visit:    Seizures  -     MRI Brain With & Without Contrast; Future    Other migraine without status migrainosus, not intractable  -     MRI Brain With & Without Contrast; Future    Other orders  -     ondansetron (ZOFRAN) 4 MG tablet; Take 1 tablet by mouth Every 8 (Eight) Hours As Needed for Nausea or Vomiting.        Cont neurology appt on 8/8/17.  MRI brain will call with appt. Will defer EEG to neurology.   Trial zofran 4mg every 8 hours as needed for nausea or vomiting. Hold phenergan while taking zofran.   Encourage bland diet, advance as tolerated.  Cont medications as prescribed.   F/u in 1 month with Dr. Gao.   Increase fluid intake, get plenty of rest.   If any seizure activity or prolonged migraine, advised to go to the ER.   [place] paperwork completed.   Patient agrees with plan of care and understands instructions. Call if worsening symptoms or  any problems or concerns.

## 2017-07-24 NOTE — TELEPHONE ENCOUNTER
WENT TO ER AND WAS PRESCRIBED BUTALBITAL -40 MG, NEEDS THIS REFLLED. SAW RG TODAY BUT SHE CAN NOT FILL IT.

## 2017-07-24 NOTE — TELEPHONE ENCOUNTER
It is okay to refill as 1 every 6 hours as needed.  Make sure she keeps neurology appointment for early August.  Quantity 20

## 2017-08-02 ENCOUNTER — TELEPHONE (OUTPATIENT)
Dept: FAMILY MEDICINE CLINIC | Facility: CLINIC | Age: 32
End: 2017-08-02

## 2017-08-02 ENCOUNTER — HOSPITAL ENCOUNTER (OUTPATIENT)
Dept: MRI IMAGING | Facility: HOSPITAL | Age: 32
Discharge: HOME | End: 2017-08-02
Admitting: NURSE PRACTITIONER

## 2017-08-02 DIAGNOSIS — G43.809 OTHER MIGRAINE WITHOUT STATUS MIGRAINOSUS, NOT INTRACTABLE: ICD-10-CM

## 2017-08-02 DIAGNOSIS — R56.9 SEIZURES (HCC): ICD-10-CM

## 2017-08-02 PROCEDURE — A9577 INJ MULTIHANCE: HCPCS | Performed by: NURSE PRACTITIONER

## 2017-08-02 PROCEDURE — 0 GADOBENATE DIMEGLUMINE 529 MG/ML SOLUTION: Performed by: NURSE PRACTITIONER

## 2017-08-02 PROCEDURE — 70553 MRI BRAIN STEM W/O & W/DYE: CPT

## 2017-08-02 RX ADMIN — GADOBENATE DIMEGLUMINE 16 ML: 529 INJECTION, SOLUTION INTRAVENOUS at 07:42

## 2017-08-02 NOTE — TELEPHONE ENCOUNTER
----- Message from RAFIQ Gupta sent at 8/2/2017  1:39 PM EDT -----  Please call patient with results.  MRI brain is normal. Cont f/u with neurology. Some ethmoid and maxillary sinus mucosal thickening, does not relate to seizures and HA.    Pt informed of MRI results

## 2017-08-02 NOTE — TELEPHONE ENCOUNTER
----- Message from RAFIQ Gupta sent at 8/2/2017  1:39 PM EDT -----  Please call patient with results.  MRI brain is normal. Cont f/u with neurology. Some ethmoid and maxillary sinus mucosal thickening, does not relate to seizures and HA.    TRC

## 2017-08-08 ENCOUNTER — OFFICE VISIT (OUTPATIENT)
Dept: NEUROLOGY | Facility: CLINIC | Age: 32
End: 2017-08-08

## 2017-08-08 VITALS
SYSTOLIC BLOOD PRESSURE: 118 MMHG | HEIGHT: 67 IN | WEIGHT: 182 LBS | BODY MASS INDEX: 28.56 KG/M2 | DIASTOLIC BLOOD PRESSURE: 80 MMHG

## 2017-08-08 DIAGNOSIS — R56.9 SEIZURES (HCC): ICD-10-CM

## 2017-08-08 DIAGNOSIS — G43.019 INTRACTABLE MIGRAINE WITHOUT AURA AND WITHOUT STATUS MIGRAINOSUS: ICD-10-CM

## 2017-08-08 DIAGNOSIS — R56.9 GENERALIZED CONVULSIVE SEIZURES (HCC): Primary | ICD-10-CM

## 2017-08-08 PROCEDURE — 99244 OFF/OP CNSLTJ NEW/EST MOD 40: CPT | Performed by: PSYCHIATRY & NEUROLOGY

## 2017-08-08 RX ORDER — RIZATRIPTAN BENZOATE 5 MG/1
10 TABLET ORAL ONCE AS NEEDED
Qty: 12 TABLET | Refills: 11 | Status: SHIPPED | OUTPATIENT
Start: 2017-08-08 | End: 2018-05-10 | Stop reason: SDUPTHER

## 2017-08-08 RX ORDER — TOPIRAMATE 50 MG/1
50 TABLET, FILM COATED ORAL NIGHTLY
Qty: 30 TABLET | Refills: 11 | Status: SHIPPED | OUTPATIENT
Start: 2017-08-08 | End: 2020-09-28 | Stop reason: SDUPTHER

## 2017-08-08 RX ORDER — LEVETIRACETAM 1000 MG/1
1000 TABLET ORAL 2 TIMES DAILY
Qty: 60 TABLET | Refills: 11 | Status: SHIPPED | OUTPATIENT
Start: 2017-08-08 | End: 2022-09-01 | Stop reason: DRUGHIGH

## 2017-08-08 RX ORDER — SUMATRIPTAN AND NAPROXEN SODIUM 85; 500 MG/1; MG/1
1 TABLET, FILM COATED ORAL
Qty: 10 TABLET | Refills: 11 | Status: SHIPPED | OUTPATIENT
Start: 2017-08-08 | End: 2017-08-08

## 2017-08-08 RX ORDER — ZOLMITRIPTAN 5 MG/1
5 TABLET, FILM COATED ORAL ONCE AS NEEDED
COMMUNITY
End: 2017-08-08

## 2017-08-08 RX ORDER — LEVETIRACETAM 1000 MG/1
1000 TABLET ORAL 2 TIMES DAILY
Qty: 60 TABLET | Refills: 11 | Status: SHIPPED | OUTPATIENT
Start: 2017-08-08 | End: 2017-08-08 | Stop reason: SDUPTHER

## 2017-08-08 NOTE — PROGRESS NOTES
Subjective:     Patient ID: Liya Parker is a 31 y.o. female.    History of Present Illness     The patient is a 31-year-old right-handed woman who was seen for further evaluation of seizures and migraine. The patient was seen today in consultation per the request of Dr. Gao.  Yenny's had grand mal seizures for about 3 years.  She has been followed in the past by Dr. Peoples.  Those records are not available.  She has had a total of 5 seizures.  The last seizure was 7/11/17.  She went to emergency room at [facility] and Keppra was increased to 1000 mg twice daily.  She is tolerating this.  She also had an MRI the brain at that time was normal.  She's had 2 EEGs in the past.  First was read in 2013 by Dr. Goff  as left temporal sharp wave abnormalities.  The second was read by Dr. Mehta in 2015 is normal.  Patient also has history of migraines of over the same period of time.  When she gets several migraine she will usually have a seizure.  The headache starts in her neck and becomes bitemporal.  Can last all day she has light noise sensitivity and GI upset.  They are severe and intermittent.  The following portions of the patient's history were reviewed and updated as appropriate: allergies, current medications, past family history, past medical history, past social history, past surgical history and problem list.    Review of Systems   Constitutional: Positive for appetite change, chills, diaphoresis, fatigue and unexpected weight change. Negative for activity change and fever.   Eyes: Negative.    Respiratory: Negative.    Cardiovascular: Negative.    Gastrointestinal: Positive for constipation. Negative for abdominal distention, abdominal pain, anal bleeding, blood in stool, diarrhea, nausea, rectal pain and vomiting.   Endocrine: Negative.    Musculoskeletal: Positive for back pain, myalgias, neck pain and neck stiffness. Negative for arthralgias, gait problem and joint swelling.   Skin: Negative.     Allergic/Immunologic: Negative.    Neurological: Positive for dizziness, tremors, seizures, syncope, speech difficulty, weakness, light-headedness, numbness and headaches. Negative for facial asymmetry.   Hematological: Negative.    Psychiatric/Behavioral: Positive for confusion, dysphoric mood and sleep disturbance. Negative for agitation, behavioral problems, decreased concentration, hallucinations, self-injury and suicidal ideas. The patient is nervous/anxious and is hyperactive.       Family History   Problem Relation Age of Onset   • Diabetes Mother    • Hypertension Mother    • Migraines Mother    • Dementia Mother    • Arthritis Mother    • Kidney disease Mother    • Hypertension Father    • Diabetes Father    • Seizures Father    • Stroke Father      Active Ambulatory Problems     Diagnosis Date Noted   • Seizures    • Pancreatitis    • Gastric ulcer    • Dyslipidemia 03/18/2016   • Environmental allergies 03/18/2016   • Intractable migraine without aura and without status migrainosus 08/08/2017     Resolved Ambulatory Problems     Diagnosis Date Noted   • No Resolved Ambulatory Problems     Past Medical History:   Diagnosis Date   • Gastric ulcer    • Headache, tension-type    • Migraine    • Pancreatitis    • Seizures      Social History     Social History   • Marital status: Single     Spouse name: N/A   • Number of children: N/A   • Years of education: N/A     Occupational History   • Not on file.     Social History Main Topics   • Smoking status: Light Tobacco Smoker   • Smokeless tobacco: Not on file   • Alcohol use Yes      Comment: OCCASIONAL   • Drug use: No   • Sexual activity: Not on file     Other Topics Concern   • Not on file     Social History Narrative       Current Outpatient Prescriptions:   •  amitriptyline (ELAVIL) 25 MG tablet, TAKE 1 TABLET BY MOUTH EVERY NIGHT, Disp: 30 tablet, Rfl: 0  •  levETIRAcetam (KEPPRA) 1000 MG tablet, Take 1 tablet by mouth 2 (Two) Times a Day., Disp: 60  tablet, Rfl: 11  •  ondansetron (ZOFRAN) 4 MG tablet, Take 1 tablet by mouth Every 8 (Eight) Hours As Needed for Nausea or Vomiting., Disp: 20 tablet, Rfl: 0  •  pantoprazole (PROTONIX) 40 MG EC tablet, TAKE 1 TABLET BY MOUTH DAILY, Disp: 30 tablet, Rfl: 0  •  promethazine (PHENERGAN) 25 MG tablet, TAKE 1 TABLET BY MOUTH EVERY 8 HOURS AS NEEDED FOR NAUSEA AND VOMITING, Disp: 20 tablet, Rfl: 0  •  zolpidem (AMBIEN) 10 MG tablet, Take 1 tablet by mouth At Night As Needed for Sleep., Disp: 30 tablet, Rfl: 2  •  fluticasone (FLONASE) 50 MCG/ACT nasal spray, 2 sprays into each nostril daily for 30 days. Administer 2 sprays in each nostril for each dose., Disp: 1 each, Rfl: 0  •  rizatriptan (MAXALT) 5 MG tablet, Take 2 tablets by mouth 1 (One) Time As Needed for Migraine. May repeat in 2 hours if needed, Disp: 12 tablet, Rfl: 11  •  topiramate (TOPAMAX) 50 MG tablet, Take 1 tablet by mouth Every Night., Disp: 30 tablet, Rfl: 11      Objective:    Neurologic Exam  Mental status examination showed normal orientation, memory, and speech.  Attention span and concentration were normal.  Fund of knowledge was normal.  Funduscopic showed no abnormality.  Visual fields were full.  Pupillary reflexes were 5 mm, symmetric, and equally reactive to light.  Eye movements were full and conjugate.  Gag reflex was normal.  Hearing was normal.  Muscles of mastication were normal.  No facial weakness was noted.  Shoulder shrug strength was normal bilaterally.  Tongue protrudes midline.  There is no drift of outstretched arms.  Deep tendon reflexes are 2+ and symmetric.  No focal weakness or atrophy was noted.  Tone was normal in all extremities.  No cerebellar signs were noted.  No abnormal movements were noted.  The patient's gait was normal.  No other pathologic reflexes such as Babinski's sign were noted.  No sensory abnormalities were noted.  Physical Exam    Assessment/Plan:     Diagnoses and all orders for this visit:    Generalized  convulsive seizures  -     EEG Awake or Asleep Routine; Future    Seizures    Intractable migraine without aura and without status migrainosus    Other orders  -     Discontinue: SUMAtriptan-naproxen (TREXIMET)  MG per tablet; Take 1 tablet by mouth Every 2 (Two) Hours As Needed for Migraine.  -     levETIRAcetam (KEPPRA) 1000 MG tablet; Take 1 tablet by mouth 2 (Two) Times a Day.  -     rizatriptan (MAXALT) 5 MG tablet; Take 2 tablets by mouth 1 (One) Time As Needed for Migraine. May repeat in 2 hours if needed  -     topiramate (TOPAMAX) 50 MG tablet; Take 1 tablet by mouth Every Night.       Prescription drug management - continue Zomig.  Discontinue butalbital.  Start topiramate 50 mg at night.  Team you Keppra 1000 mg twice daily.    EEG    Eventually would like to transition from Keppra to Topamax    Obtain records from Dr. Peoples.    Phone follow-up in one month.  Follow-up in the office in 2 months    Thank you for allowing me to share in the care of this patient.  Jose Berg M.D.

## 2017-08-15 ENCOUNTER — APPOINTMENT (OUTPATIENT)
Dept: NEUROLOGY | Facility: HOSPITAL | Age: 32
End: 2017-08-15
Attending: PSYCHIATRY & NEUROLOGY

## 2017-08-16 ENCOUNTER — OFFICE VISIT (OUTPATIENT)
Dept: FAMILY MEDICINE CLINIC | Facility: CLINIC | Age: 32
End: 2017-08-16

## 2017-08-16 VITALS
SYSTOLIC BLOOD PRESSURE: 108 MMHG | DIASTOLIC BLOOD PRESSURE: 68 MMHG | HEIGHT: 67 IN | WEIGHT: 176.4 LBS | OXYGEN SATURATION: 98 % | TEMPERATURE: 98.9 F | HEART RATE: 87 BPM | BODY MASS INDEX: 27.69 KG/M2

## 2017-08-16 DIAGNOSIS — M54.2 NECK PAIN: Primary | ICD-10-CM

## 2017-08-16 PROCEDURE — 99213 OFFICE O/P EST LOW 20 MIN: CPT | Performed by: INTERNAL MEDICINE

## 2017-08-16 RX ORDER — ESCITALOPRAM OXALATE 10 MG/1
10 TABLET ORAL DAILY
COMMUNITY
End: 2019-03-01

## 2017-08-16 RX ORDER — TIZANIDINE HYDROCHLORIDE 4 MG/1
4 CAPSULE, GELATIN COATED ORAL 3 TIMES DAILY
Qty: 30 CAPSULE | Refills: 3 | Status: SHIPPED | OUTPATIENT
Start: 2017-08-16 | End: 2017-10-23 | Stop reason: SDUPTHER

## 2017-08-16 NOTE — PROGRESS NOTES
Cong Parker is a 31 y.o. female.     History of Present Illness   She was seen for follow-up for neck pain.  She was able to flex and extend her neck with mild pain only.  Patient felt like she can return to work at this time.  Patient was released to return on Monday.    Much of this encounter note is an electronic transcription/translation of spoken language to printed text.  The electronic translation of spoken language may permit erroneous, or at times, nonsensical words or phrases to be inadvertently transcribed.  Although I have reviewed the note for such errors, some may still exist.  The following portions of the patient's history were reviewed and updated as appropriate: allergies, current medications, past family history, past medical history, past social history, past surgical history and problem list.    Review of Systems   Constitutional: Negative for fatigue and fever.   HENT: Positive for congestion. Negative for trouble swallowing.    Eyes: Negative for discharge and visual disturbance.   Respiratory: Negative for choking and shortness of breath.    Cardiovascular: Negative for chest pain and palpitations.   Gastrointestinal: Negative for abdominal pain and blood in stool.   Endocrine: Negative.    Genitourinary: Negative for genital sores and hematuria.   Musculoskeletal: Positive for neck pain. Negative for gait problem and joint swelling.   Skin: Negative for color change, pallor, rash and wound.   Allergic/Immunologic: Positive for environmental allergies. Negative for immunocompromised state.   Neurological: Negative for facial asymmetry and speech difficulty.   Psychiatric/Behavioral: Negative for hallucinations and suicidal ideas.       Objective   Physical Exam   Constitutional: She is oriented to person, place, and time. She appears well-developed and well-nourished.   HENT:   Head: Normocephalic.   Eyes: Conjunctivae are normal. Pupils are equal, round, and reactive to  light.   Neck: Normal range of motion. Neck supple.   Cardiovascular: Normal rate, regular rhythm and normal heart sounds.    Pulmonary/Chest: Effort normal and breath sounds normal.   Abdominal: Soft. Bowel sounds are normal.   Musculoskeletal: Normal range of motion. She exhibits no edema, tenderness or deformity.   Neurological: She is alert and oriented to person, place, and time.   Skin: Skin is warm and dry.   Psychiatric: She has a normal mood and affect. Her behavior is normal. Judgment and thought content normal.   Nursing note and vitals reviewed.      Assessment/Plan   Problems Addressed this Visit     None      Visit Diagnoses     Neck pain    -  Primary

## 2017-08-22 RX ORDER — PANTOPRAZOLE SODIUM 40 MG/1
TABLET, DELAYED RELEASE ORAL
Qty: 30 TABLET | Refills: 0 | Status: SHIPPED | OUTPATIENT
Start: 2017-08-22 | End: 2017-09-18 | Stop reason: SDUPTHER

## 2017-08-22 RX ORDER — AMITRIPTYLINE HYDROCHLORIDE 25 MG/1
TABLET, FILM COATED ORAL
Qty: 30 TABLET | Refills: 0 | Status: SHIPPED | OUTPATIENT
Start: 2017-08-22 | End: 2017-09-18 | Stop reason: SDUPTHER

## 2017-09-19 RX ORDER — AMITRIPTYLINE HYDROCHLORIDE 25 MG/1
TABLET, FILM COATED ORAL
Qty: 30 TABLET | Refills: 0 | Status: SHIPPED | OUTPATIENT
Start: 2017-09-19 | End: 2017-09-25 | Stop reason: SDUPTHER

## 2017-09-19 RX ORDER — PANTOPRAZOLE SODIUM 40 MG/1
TABLET, DELAYED RELEASE ORAL
Qty: 30 TABLET | Refills: 0 | Status: SHIPPED | OUTPATIENT
Start: 2017-09-19 | End: 2017-11-09 | Stop reason: SDUPTHER

## 2017-09-26 RX ORDER — ZOLPIDEM TARTRATE 10 MG/1
TABLET ORAL
Qty: 30 TABLET | Refills: 0 | Status: SHIPPED | OUTPATIENT
Start: 2017-09-26 | End: 2017-12-23 | Stop reason: SDUPTHER

## 2017-09-26 RX ORDER — AMITRIPTYLINE HYDROCHLORIDE 25 MG/1
TABLET, FILM COATED ORAL
Qty: 30 TABLET | Refills: 0 | Status: SHIPPED | OUTPATIENT
Start: 2017-09-26 | End: 2018-01-18 | Stop reason: SDUPTHER

## 2017-10-23 RX ORDER — TIZANIDINE HYDROCHLORIDE 4 MG/1
CAPSULE, GELATIN COATED ORAL
Qty: 30 CAPSULE | Refills: 0 | Status: SHIPPED | OUTPATIENT
Start: 2017-10-23 | End: 2017-11-09 | Stop reason: SDUPTHER

## 2017-10-23 RX ORDER — LUBIPROSTONE 24 UG/1
CAPSULE, GELATIN COATED ORAL
Qty: 30 CAPSULE | Refills: 0 | Status: SHIPPED | OUTPATIENT
Start: 2017-10-23 | End: 2018-05-10 | Stop reason: SDUPTHER

## 2017-11-09 RX ORDER — TIZANIDINE HYDROCHLORIDE 4 MG/1
CAPSULE, GELATIN COATED ORAL
Qty: 30 CAPSULE | Refills: 0 | Status: SHIPPED | OUTPATIENT
Start: 2017-11-09 | End: 2017-11-25 | Stop reason: SDUPTHER

## 2017-11-09 RX ORDER — PANTOPRAZOLE SODIUM 40 MG/1
TABLET, DELAYED RELEASE ORAL
Qty: 30 TABLET | Refills: 0 | Status: SHIPPED | OUTPATIENT
Start: 2017-11-09 | End: 2017-12-06 | Stop reason: SDUPTHER

## 2017-11-09 RX ORDER — PROMETHAZINE HYDROCHLORIDE 25 MG/1
TABLET ORAL
Qty: 20 TABLET | Refills: 0 | Status: SHIPPED | OUTPATIENT
Start: 2017-11-09 | End: 2018-05-10 | Stop reason: SDUPTHER

## 2017-11-27 RX ORDER — TIZANIDINE HYDROCHLORIDE 4 MG/1
CAPSULE, GELATIN COATED ORAL
Qty: 30 CAPSULE | Refills: 0 | Status: SHIPPED | OUTPATIENT
Start: 2017-11-27 | End: 2017-12-06 | Stop reason: SDUPTHER

## 2017-12-06 RX ORDER — PANTOPRAZOLE SODIUM 40 MG/1
TABLET, DELAYED RELEASE ORAL
Qty: 30 TABLET | Refills: 3 | Status: SHIPPED | OUTPATIENT
Start: 2017-12-06 | End: 2018-05-10 | Stop reason: SDUPTHER

## 2017-12-06 RX ORDER — TIZANIDINE HYDROCHLORIDE 4 MG/1
CAPSULE, GELATIN COATED ORAL
Qty: 30 CAPSULE | Refills: 3 | Status: SHIPPED | OUTPATIENT
Start: 2017-12-06 | End: 2018-03-01 | Stop reason: SDUPTHER

## 2017-12-26 RX ORDER — ZOLPIDEM TARTRATE 10 MG/1
TABLET ORAL
Qty: 30 TABLET | Refills: 0
Start: 2017-12-26 | End: 2018-01-22 | Stop reason: SDUPTHER

## 2018-01-19 RX ORDER — AMITRIPTYLINE HYDROCHLORIDE 25 MG/1
TABLET, FILM COATED ORAL
Qty: 21 TABLET | Refills: 0 | Status: SHIPPED | OUTPATIENT
Start: 2018-01-19 | End: 2018-01-22 | Stop reason: SDUPTHER

## 2018-01-22 RX ORDER — ZOLPIDEM TARTRATE 10 MG/1
TABLET ORAL
Qty: 30 TABLET | Refills: 0 | Status: SHIPPED | OUTPATIENT
Start: 2018-01-22 | End: 2018-01-22 | Stop reason: SDUPTHER

## 2018-01-22 RX ORDER — AMITRIPTYLINE HYDROCHLORIDE 25 MG/1
TABLET, FILM COATED ORAL
Qty: 21 TABLET | Refills: 0 | Status: SHIPPED | OUTPATIENT
Start: 2018-01-22 | End: 2018-03-01 | Stop reason: SDUPTHER

## 2018-01-23 RX ORDER — ZOLPIDEM TARTRATE 10 MG/1
TABLET ORAL
Qty: 14 TABLET | Refills: 0 | OUTPATIENT
Start: 2018-01-23 | End: 2018-03-16 | Stop reason: SDUPTHER

## 2018-02-05 RX ORDER — ZOLPIDEM TARTRATE 10 MG/1
TABLET ORAL
Qty: 14 TABLET | Refills: 0 | OUTPATIENT
Start: 2018-02-05

## 2018-03-01 RX ORDER — AMITRIPTYLINE HYDROCHLORIDE 25 MG/1
TABLET, FILM COATED ORAL
Qty: 21 TABLET | Refills: 0 | Status: SHIPPED | OUTPATIENT
Start: 2018-03-01 | End: 2018-03-16 | Stop reason: SDUPTHER

## 2018-03-01 RX ORDER — TIZANIDINE HYDROCHLORIDE 4 MG/1
CAPSULE, GELATIN COATED ORAL
Qty: 30 CAPSULE | Refills: 0 | Status: SHIPPED | OUTPATIENT
Start: 2018-03-01 | End: 2018-03-22 | Stop reason: SDUPTHER

## 2018-03-16 ENCOUNTER — TELEPHONE (OUTPATIENT)
Dept: FAMILY MEDICINE CLINIC | Facility: CLINIC | Age: 33
End: 2018-03-16

## 2018-03-16 RX ORDER — ZOLPIDEM TARTRATE 10 MG/1
10 TABLET ORAL
Qty: 30 TABLET | Refills: 0 | OUTPATIENT
Start: 2018-03-16 | End: 2018-04-11 | Stop reason: SDUPTHER

## 2018-03-16 RX ORDER — AMITRIPTYLINE HYDROCHLORIDE 25 MG/1
25 TABLET, FILM COATED ORAL NIGHTLY PRN
Qty: 30 TABLET | Refills: 0 | Status: SHIPPED | OUTPATIENT
Start: 2018-03-16 | End: 2018-04-11 | Stop reason: SDUPTHER

## 2018-03-16 NOTE — TELEPHONE ENCOUNTER
NEEDS HER MEDS REFILLED WILL BE IN ON MAY 9TH FOR A PHYSICAL    AMBIEN 10 MG  AMITRIPTYLINE  25 MG    PLEASE CALL AND ADVISE.

## 2018-03-22 RX ORDER — TIZANIDINE HYDROCHLORIDE 4 MG/1
CAPSULE, GELATIN COATED ORAL
Qty: 30 CAPSULE | Refills: 0 | Status: SHIPPED | OUTPATIENT
Start: 2018-03-22 | End: 2018-04-11 | Stop reason: SDUPTHER

## 2018-04-11 RX ORDER — TIZANIDINE HYDROCHLORIDE 4 MG/1
CAPSULE, GELATIN COATED ORAL
Qty: 30 CAPSULE | Refills: 0 | Status: SHIPPED | OUTPATIENT
Start: 2018-04-11 | End: 2018-05-05 | Stop reason: SDUPTHER

## 2018-04-11 RX ORDER — ZOLPIDEM TARTRATE 10 MG/1
TABLET ORAL
Qty: 30 TABLET | Refills: 0 | Status: SHIPPED | OUTPATIENT
Start: 2018-04-11 | End: 2018-05-10 | Stop reason: SDUPTHER

## 2018-04-11 RX ORDER — AMITRIPTYLINE HYDROCHLORIDE 25 MG/1
25 TABLET, FILM COATED ORAL NIGHTLY PRN
Qty: 30 TABLET | Refills: 0 | Status: SHIPPED | OUTPATIENT
Start: 2018-04-11 | End: 2018-05-05 | Stop reason: SDUPTHER

## 2018-05-07 RX ORDER — AMITRIPTYLINE HYDROCHLORIDE 25 MG/1
25 TABLET, FILM COATED ORAL NIGHTLY PRN
Qty: 30 TABLET | Refills: 0 | Status: SHIPPED | OUTPATIENT
Start: 2018-05-07 | End: 2018-05-10 | Stop reason: SDUPTHER

## 2018-05-07 RX ORDER — TIZANIDINE HYDROCHLORIDE 4 MG/1
CAPSULE, GELATIN COATED ORAL
Qty: 30 CAPSULE | Refills: 0 | Status: SHIPPED | OUTPATIENT
Start: 2018-05-07 | End: 2018-05-10 | Stop reason: SDUPTHER

## 2018-05-10 ENCOUNTER — OFFICE VISIT (OUTPATIENT)
Dept: FAMILY MEDICINE CLINIC | Facility: CLINIC | Age: 33
End: 2018-05-10

## 2018-05-10 VITALS
DIASTOLIC BLOOD PRESSURE: 80 MMHG | SYSTOLIC BLOOD PRESSURE: 104 MMHG | OXYGEN SATURATION: 99 % | HEART RATE: 92 BPM | RESPIRATION RATE: 15 BRPM | TEMPERATURE: 97.7 F | WEIGHT: 174 LBS | BODY MASS INDEX: 27.31 KG/M2 | HEIGHT: 67 IN

## 2018-05-10 DIAGNOSIS — Z00.00 PHYSICAL EXAM, ANNUAL: Primary | ICD-10-CM

## 2018-05-10 LAB
CHOLEST SERPL-MCNC: 154 MG/DL (ref 0–200)
GLUCOSE P FAST SERPL-MCNC: 88 MG/DL (ref 72–112)
HDLC SERPL-MCNC: 44 MG/DL (ref 40–60)
LDLC SERPL CALC-MCNC: 86 MG/DL (ref 0–100)
LDLC/HDLC SERPL: 1.95 {RATIO}
TRIGL SERPL-MCNC: 120 MG/DL (ref 0–150)
VLDLC SERPL-MCNC: 24 MG/DL (ref 5–40)

## 2018-05-10 PROCEDURE — 36415 COLL VENOUS BLD VENIPUNCTURE: CPT | Performed by: INTERNAL MEDICINE

## 2018-05-10 PROCEDURE — 82947 ASSAY GLUCOSE BLOOD QUANT: CPT | Performed by: INTERNAL MEDICINE

## 2018-05-10 PROCEDURE — 99395 PREV VISIT EST AGE 18-39: CPT | Performed by: INTERNAL MEDICINE

## 2018-05-10 PROCEDURE — 80061 LIPID PANEL: CPT | Performed by: INTERNAL MEDICINE

## 2018-05-10 PROCEDURE — 83036 HEMOGLOBIN GLYCOSYLATED A1C: CPT | Performed by: INTERNAL MEDICINE

## 2018-05-10 RX ORDER — AMITRIPTYLINE HYDROCHLORIDE 25 MG/1
25 TABLET, FILM COATED ORAL NIGHTLY PRN
Qty: 30 TABLET | Refills: 3 | Status: SHIPPED | OUTPATIENT
Start: 2018-05-10 | End: 2018-11-19 | Stop reason: SDUPTHER

## 2018-05-10 RX ORDER — TIZANIDINE HYDROCHLORIDE 4 MG/1
4 CAPSULE, GELATIN COATED ORAL 3 TIMES DAILY
Qty: 30 CAPSULE | Refills: 0 | Status: SHIPPED | OUTPATIENT
Start: 2018-05-10 | End: 2018-05-23 | Stop reason: SDUPTHER

## 2018-05-10 RX ORDER — PROMETHAZINE HYDROCHLORIDE 25 MG/1
25 TABLET ORAL EVERY 8 HOURS PRN
Qty: 20 TABLET | Refills: 3 | Status: SHIPPED | OUTPATIENT
Start: 2018-05-10 | End: 2018-09-13 | Stop reason: SDUPTHER

## 2018-05-10 RX ORDER — RIZATRIPTAN BENZOATE 5 MG/1
10 TABLET ORAL ONCE AS NEEDED
Qty: 12 TABLET | Refills: 11 | Status: SHIPPED | OUTPATIENT
Start: 2018-05-10 | End: 2019-03-01 | Stop reason: SDUPTHER

## 2018-05-10 RX ORDER — PANTOPRAZOLE SODIUM 40 MG/1
40 TABLET, DELAYED RELEASE ORAL DAILY
Qty: 30 TABLET | Refills: 3 | Status: SHIPPED | OUTPATIENT
Start: 2018-05-10 | End: 2018-10-22 | Stop reason: SDUPTHER

## 2018-05-10 RX ORDER — LUBIPROSTONE 24 UG/1
24 CAPSULE ORAL 2 TIMES DAILY WITH MEALS
Qty: 30 CAPSULE | Refills: 3 | Status: SHIPPED | OUTPATIENT
Start: 2018-05-10 | End: 2018-07-20 | Stop reason: SDUPTHER

## 2018-05-10 RX ORDER — ZOLPIDEM TARTRATE 10 MG/1
10 TABLET ORAL
Qty: 30 TABLET | Refills: 0 | Status: SHIPPED | OUTPATIENT
Start: 2018-05-10 | End: 2018-06-06 | Stop reason: SDUPTHER

## 2018-05-10 NOTE — PROGRESS NOTES
Cong Parker is a 32 y.o. female.     History of Present Illness   Patient was seen for physical.    Dictated utilizing Dragon dictation. If there are questions or for further clarification, please contact me.   The following portions of the patient's history were reviewed and updated as appropriate: allergies, current medications, past family history, past medical history, past social history, past surgical history and problem list.    Review of Systems   Constitutional: Negative for fatigue and fever.   HENT: Positive for congestion. Negative for trouble swallowing.    Eyes: Negative for discharge and visual disturbance.   Respiratory: Negative for choking and shortness of breath.    Cardiovascular: Negative for chest pain and palpitations.   Gastrointestinal: Negative for abdominal pain and blood in stool.   Endocrine: Negative.    Genitourinary: Negative for genital sores and hematuria.   Musculoskeletal: Negative for gait problem and joint swelling.   Skin: Negative for color change, pallor, rash and wound.   Allergic/Immunologic: Positive for environmental allergies. Negative for immunocompromised state.   Neurological: Negative for facial asymmetry and speech difficulty.   Psychiatric/Behavioral: Negative for hallucinations and suicidal ideas.       Objective   Physical Exam   Constitutional: She is oriented to person, place, and time. She appears well-developed and well-nourished. No distress.   HENT:   Head: Normocephalic and atraumatic.   Right Ear: External ear normal.   Left Ear: External ear normal.   Nose: Nose normal.   Mouth/Throat: Oropharynx is clear and moist. No oropharyngeal exudate.   Eyes: Conjunctivae and EOM are normal. Pupils are equal, round, and reactive to light. Right eye exhibits no discharge. Left eye exhibits no discharge. No scleral icterus.   Neck: Normal range of motion. Neck supple. No JVD present. No tracheal deviation present. No thyromegaly present.    Cardiovascular: Normal rate, regular rhythm and normal heart sounds.  Exam reveals no gallop and no friction rub.    No murmur heard.  Pulmonary/Chest: Effort normal and breath sounds normal. No stridor. No respiratory distress. She has no wheezes. She has no rales. She exhibits no tenderness.   Abdominal: Soft. Bowel sounds are normal. She exhibits no distension and no mass. There is no tenderness. There is no rebound and no guarding. No hernia.   Musculoskeletal: Normal range of motion. She exhibits no edema, tenderness or deformity.   Lymphadenopathy:     She has no cervical adenopathy.   Neurological: She is alert and oriented to person, place, and time. She displays normal reflexes. No cranial nerve deficit. She exhibits normal muscle tone. Coordination normal.   Skin: Skin is warm and dry. No rash noted. She is not diaphoretic. No erythema. No pallor.   Psychiatric: She has a normal mood and affect. Her behavior is normal. Judgment and thought content normal.   Nursing note and vitals reviewed.      Assessment/Plan   Problems Addressed this Visit     None      Visit Diagnoses     Physical exam, annual    -  Primary    Relevant Medications    zolpidem (AMBIEN) 10 MG tablet    lubiprostone (AMITIZA) 24 MCG capsule    amitriptyline (ELAVIL) 25 MG tablet    promethazine (PHENERGAN) 25 MG tablet    rizatriptan (MAXALT) 5 MG tablet    TiZANidine (ZANAFLEX) 4 MG capsule    pantoprazole (PROTONIX) 40 MG EC tablet    Other Relevant Orders    Lipid Panel    Glucose, Fasting    Hemoglobin A1c    Nicotine Screen, Urine - Urine, Clean Catch    CBC & Differential    Comprehensive Metabolic Panel    Lipid Panel

## 2018-05-11 LAB
COTININE UR-MCNC: NEGATIVE NG/ML
HBA1C MFR BLD: 5.46 % (ref 4.8–5.6)
Lab: NORMAL

## 2018-05-23 DIAGNOSIS — Z00.00 PHYSICAL EXAM, ANNUAL: ICD-10-CM

## 2018-05-23 RX ORDER — TIZANIDINE HYDROCHLORIDE 4 MG/1
CAPSULE, GELATIN COATED ORAL
Qty: 30 CAPSULE | Refills: 0 | Status: SHIPPED | OUTPATIENT
Start: 2018-05-23 | End: 2018-06-21 | Stop reason: SDUPTHER

## 2018-06-06 DIAGNOSIS — Z00.00 PHYSICAL EXAM, ANNUAL: ICD-10-CM

## 2018-06-07 DIAGNOSIS — Z00.00 PHYSICAL EXAM, ANNUAL: ICD-10-CM

## 2018-06-07 RX ORDER — ZOLPIDEM TARTRATE 10 MG/1
10 TABLET ORAL
Qty: 30 TABLET | Refills: 0 | Status: SHIPPED | OUTPATIENT
Start: 2018-06-07 | End: 2018-06-28 | Stop reason: SDUPTHER

## 2018-06-07 RX ORDER — ZOLPIDEM TARTRATE 10 MG/1
10 TABLET ORAL
Qty: 30 TABLET | Refills: 0 | Status: SHIPPED | OUTPATIENT
Start: 2018-06-07 | End: 2019-03-09 | Stop reason: SDUPTHER

## 2018-06-21 DIAGNOSIS — Z00.00 PHYSICAL EXAM, ANNUAL: ICD-10-CM

## 2018-06-21 RX ORDER — TIZANIDINE HYDROCHLORIDE 4 MG/1
CAPSULE, GELATIN COATED ORAL
Qty: 30 CAPSULE | Refills: 0 | Status: SHIPPED | OUTPATIENT
Start: 2018-06-21 | End: 2019-03-04 | Stop reason: SDUPTHER

## 2018-06-21 RX ORDER — AMITRIPTYLINE HYDROCHLORIDE 25 MG/1
25 TABLET, FILM COATED ORAL NIGHTLY PRN
Qty: 30 TABLET | Refills: 0 | Status: SHIPPED | OUTPATIENT
Start: 2018-06-21 | End: 2018-10-01

## 2018-06-22 ENCOUNTER — TELEPHONE (OUTPATIENT)
Dept: FAMILY MEDICINE CLINIC | Facility: CLINIC | Age: 33
End: 2018-06-22

## 2018-06-22 NOTE — TELEPHONE ENCOUNTER
----- Message from Liya Parker sent at 6/22/2018  1:23 PM EDT -----  Regarding: Prescription Question  Contact: [phone]  No it is [facility] pharmacy [address] [address] [phone]

## 2018-06-22 NOTE — TELEPHONE ENCOUNTER
Called in amitriptyline and tizanidine to pharmacy have to ask RLS about Ambien I tried calling pt to let her know but # not working.

## 2018-06-28 DIAGNOSIS — Z00.00 PHYSICAL EXAM, ANNUAL: ICD-10-CM

## 2018-06-29 RX ORDER — ZOLPIDEM TARTRATE 10 MG/1
10 TABLET ORAL
Qty: 30 TABLET | Refills: 0 | Status: SHIPPED | OUTPATIENT
Start: 2018-06-29 | End: 2018-08-07 | Stop reason: SDUPTHER

## 2018-07-19 RX ORDER — TIZANIDINE 4 MG/1
TABLET ORAL
Qty: 90 TABLET | Refills: 1 | Status: SHIPPED | OUTPATIENT
Start: 2018-07-19 | End: 2018-09-13 | Stop reason: SDUPTHER

## 2018-07-20 DIAGNOSIS — Z00.00 PHYSICAL EXAM, ANNUAL: ICD-10-CM

## 2018-07-20 RX ORDER — LUBIPROSTONE 24 UG/1
CAPSULE, GELATIN COATED ORAL
Qty: 30 CAPSULE | Refills: 0 | Status: SHIPPED | OUTPATIENT
Start: 2018-07-20 | End: 2020-04-29 | Stop reason: SDUPTHER

## 2018-08-07 DIAGNOSIS — Z00.00 PHYSICAL EXAM, ANNUAL: ICD-10-CM

## 2018-08-07 RX ORDER — ZOLPIDEM TARTRATE 10 MG/1
10 TABLET ORAL
Qty: 30 TABLET | Refills: 0 | Status: SHIPPED | OUTPATIENT
Start: 2018-08-07 | End: 2018-09-05 | Stop reason: SDUPTHER

## 2018-09-05 DIAGNOSIS — Z00.00 PHYSICAL EXAM, ANNUAL: ICD-10-CM

## 2018-09-05 RX ORDER — ZOLPIDEM TARTRATE 10 MG/1
10 TABLET ORAL
Qty: 30 TABLET | Refills: 0 | Status: SHIPPED | OUTPATIENT
Start: 2018-09-05 | End: 2018-10-01

## 2018-09-13 DIAGNOSIS — Z00.00 PHYSICAL EXAM, ANNUAL: ICD-10-CM

## 2018-09-13 RX ORDER — PROMETHAZINE HYDROCHLORIDE 25 MG/1
TABLET ORAL
Qty: 20 TABLET | Refills: 0 | Status: SHIPPED | OUTPATIENT
Start: 2018-09-13 | End: 2018-10-22 | Stop reason: SDUPTHER

## 2018-09-13 RX ORDER — TIZANIDINE 4 MG/1
TABLET ORAL
Qty: 30 TABLET | Refills: 0 | Status: SHIPPED | OUTPATIENT
Start: 2018-09-13 | End: 2018-10-01 | Stop reason: SDUPTHER

## 2018-10-01 RX ORDER — TIZANIDINE 4 MG/1
TABLET ORAL
Qty: 30 TABLET | Refills: 3 | Status: SHIPPED | OUTPATIENT
Start: 2018-10-01 | End: 2018-10-01

## 2018-10-01 RX ORDER — TOPIRAMATE 50 MG/1
TABLET, FILM COATED ORAL
Qty: 30 TABLET | Refills: 0 | OUTPATIENT
Start: 2018-10-01

## 2018-10-02 RX ORDER — TOPIRAMATE 50 MG/1
TABLET, FILM COATED ORAL
Qty: 30 TABLET | Refills: 0 | OUTPATIENT
Start: 2018-10-02

## 2018-10-06 DIAGNOSIS — Z00.00 PHYSICAL EXAM, ANNUAL: ICD-10-CM

## 2018-10-08 ENCOUNTER — TELEPHONE (OUTPATIENT)
Dept: FAMILY MEDICINE CLINIC | Facility: CLINIC | Age: 33
End: 2018-10-08

## 2018-10-08 DIAGNOSIS — Z00.00 PHYSICAL EXAM, ANNUAL: ICD-10-CM

## 2018-10-08 RX ORDER — ZOLPIDEM TARTRATE 10 MG/1
10 TABLET ORAL
Qty: 30 TABLET | Refills: 0 | Status: SHIPPED | OUTPATIENT
Start: 2018-10-08 | End: 2019-03-11 | Stop reason: SDUPTHER

## 2018-10-08 RX ORDER — ZOLPIDEM TARTRATE 10 MG/1
10 TABLET ORAL
Qty: 30 TABLET | Refills: 0 | Status: CANCELLED | OUTPATIENT
Start: 2018-10-08

## 2018-10-09 NOTE — TELEPHONE ENCOUNTER
Cancelled at [facility] sent in error and called in to [facility]

## 2018-10-22 DIAGNOSIS — Z00.00 PHYSICAL EXAM, ANNUAL: ICD-10-CM

## 2018-10-22 RX ORDER — PROMETHAZINE HYDROCHLORIDE 25 MG/1
TABLET ORAL
Qty: 20 TABLET | Refills: 0 | Status: SHIPPED | OUTPATIENT
Start: 2018-10-22 | End: 2018-11-05 | Stop reason: SDUPTHER

## 2018-10-22 RX ORDER — PANTOPRAZOLE SODIUM 40 MG/1
TABLET, DELAYED RELEASE ORAL
Qty: 30 TABLET | Refills: 0 | Status: SHIPPED | OUTPATIENT
Start: 2018-10-22 | End: 2019-04-29 | Stop reason: SDUPTHER

## 2018-11-05 DIAGNOSIS — Z00.00 PHYSICAL EXAM, ANNUAL: ICD-10-CM

## 2018-11-05 RX ORDER — PROMETHAZINE HYDROCHLORIDE 25 MG/1
TABLET ORAL
Qty: 20 TABLET | Refills: 0 | Status: SHIPPED | OUTPATIENT
Start: 2018-11-05 | End: 2018-11-28 | Stop reason: SDUPTHER

## 2018-11-19 DIAGNOSIS — Z00.00 PHYSICAL EXAM, ANNUAL: ICD-10-CM

## 2018-11-19 RX ORDER — AMITRIPTYLINE HYDROCHLORIDE 25 MG/1
TABLET, FILM COATED ORAL
Qty: 30 TABLET | Refills: 3 | Status: SHIPPED | OUTPATIENT
Start: 2018-11-19 | End: 2019-03-19 | Stop reason: SDUPTHER

## 2018-11-28 DIAGNOSIS — Z00.00 PHYSICAL EXAM, ANNUAL: ICD-10-CM

## 2018-11-28 RX ORDER — PROMETHAZINE HYDROCHLORIDE 25 MG/1
TABLET ORAL
Qty: 20 TABLET | Refills: 0 | Status: SHIPPED | OUTPATIENT
Start: 2018-11-28 | End: 2018-12-19 | Stop reason: SDUPTHER

## 2018-11-28 RX ORDER — TIZANIDINE 4 MG/1
TABLET ORAL
Qty: 30 TABLET | Refills: 0 | Status: SHIPPED | OUTPATIENT
Start: 2018-11-28 | End: 2018-12-19 | Stop reason: SDUPTHER

## 2018-12-19 DIAGNOSIS — Z00.00 PHYSICAL EXAM, ANNUAL: ICD-10-CM

## 2018-12-19 RX ORDER — TIZANIDINE 4 MG/1
TABLET ORAL
Qty: 30 TABLET | Refills: 0 | Status: SHIPPED | OUTPATIENT
Start: 2018-12-19 | End: 2019-01-02 | Stop reason: SDUPTHER

## 2018-12-19 RX ORDER — PROMETHAZINE HYDROCHLORIDE 25 MG/1
TABLET ORAL
Qty: 20 TABLET | Refills: 0 | Status: SHIPPED | OUTPATIENT
Start: 2018-12-19 | End: 2019-02-04 | Stop reason: SDUPTHER

## 2019-01-02 RX ORDER — TIZANIDINE 4 MG/1
TABLET ORAL
Qty: 30 TABLET | Refills: 0 | Status: SHIPPED | OUTPATIENT
Start: 2019-01-02 | End: 2019-01-21 | Stop reason: SDUPTHER

## 2019-01-21 RX ORDER — TIZANIDINE 4 MG/1
TABLET ORAL
Qty: 30 TABLET | Refills: 0 | Status: SHIPPED | OUTPATIENT
Start: 2019-01-21 | End: 2019-02-04 | Stop reason: SDUPTHER

## 2019-02-04 DIAGNOSIS — Z00.00 PHYSICAL EXAM, ANNUAL: ICD-10-CM

## 2019-02-05 RX ORDER — PROMETHAZINE HYDROCHLORIDE 25 MG/1
TABLET ORAL
Qty: 20 TABLET | Refills: 0 | Status: SHIPPED | OUTPATIENT
Start: 2019-02-05 | End: 2019-02-18 | Stop reason: SDUPTHER

## 2019-02-05 RX ORDER — TIZANIDINE 4 MG/1
TABLET ORAL
Qty: 30 TABLET | Refills: 0 | Status: SHIPPED | OUTPATIENT
Start: 2019-02-05 | End: 2019-02-18 | Stop reason: SDUPTHER

## 2019-02-18 DIAGNOSIS — Z00.00 PHYSICAL EXAM, ANNUAL: ICD-10-CM

## 2019-02-18 RX ORDER — PROMETHAZINE HYDROCHLORIDE 25 MG/1
TABLET ORAL
Qty: 20 TABLET | Refills: 0 | Status: SHIPPED | OUTPATIENT
Start: 2019-02-18 | End: 2019-03-04 | Stop reason: SDUPTHER

## 2019-02-18 RX ORDER — TIZANIDINE 4 MG/1
TABLET ORAL
Qty: 30 TABLET | Refills: 0 | Status: SHIPPED | OUTPATIENT
Start: 2019-02-18 | End: 2019-03-01 | Stop reason: SDUPTHER

## 2019-03-01 ENCOUNTER — OFFICE VISIT (OUTPATIENT)
Dept: FAMILY MEDICINE CLINIC | Facility: CLINIC | Age: 34
End: 2019-03-01

## 2019-03-01 VITALS
HEIGHT: 67 IN | HEART RATE: 85 BPM | TEMPERATURE: 98.7 F | BODY MASS INDEX: 29.73 KG/M2 | SYSTOLIC BLOOD PRESSURE: 106 MMHG | WEIGHT: 189.4 LBS | OXYGEN SATURATION: 100 % | DIASTOLIC BLOOD PRESSURE: 68 MMHG

## 2019-03-01 DIAGNOSIS — Z00.00 PHYSICAL EXAM, ANNUAL: ICD-10-CM

## 2019-03-01 DIAGNOSIS — Z00.8 ENCOUNTER FOR BIOMETRIC SCREENING: ICD-10-CM

## 2019-03-01 DIAGNOSIS — R56.9 SEIZURES (HCC): ICD-10-CM

## 2019-03-01 DIAGNOSIS — R79.89 ELEVATED LFTS: Primary | ICD-10-CM

## 2019-03-01 DIAGNOSIS — G43.019 INTRACTABLE MIGRAINE WITHOUT AURA AND WITHOUT STATUS MIGRAINOSUS: Primary | ICD-10-CM

## 2019-03-01 DIAGNOSIS — E78.5 DYSLIPIDEMIA: ICD-10-CM

## 2019-03-01 LAB
ALBUMIN SERPL-MCNC: 3.9 G/DL (ref 3.5–5.2)
ALBUMIN/GLOB SERPL: 1.1 G/DL
ALP SERPL-CCNC: 52 U/L (ref 39–117)
ALT SERPL W P-5'-P-CCNC: 37 U/L (ref 1–33)
ANION GAP SERPL CALCULATED.3IONS-SCNC: 11.5 MMOL/L
AST SERPL-CCNC: 23 U/L (ref 1–32)
BILIRUB SERPL-MCNC: <0.2 MG/DL (ref 0.1–1.2)
BUN BLD-MCNC: 11 MG/DL (ref 6–20)
BUN/CREAT SERPL: 14.9 (ref 7–25)
CALCIUM SPEC-SCNC: 9.2 MG/DL (ref 8.6–10.5)
CHLORIDE SERPL-SCNC: 103 MMOL/L (ref 98–107)
CHOLEST SERPL-MCNC: 157 MG/DL (ref 0–200)
CO2 SERPL-SCNC: 26.5 MMOL/L (ref 22–29)
CREAT BLD-MCNC: 0.74 MG/DL (ref 0.57–1)
ERYTHROCYTE [DISTWIDTH] IN BLOOD BY AUTOMATED COUNT: 13 % (ref 12.3–15.4)
GFR SERPL CREATININE-BSD FRML MDRD: 108 ML/MIN/1.73
GLOBULIN UR ELPH-MCNC: 3.4 GM/DL
GLUCOSE BLD-MCNC: 94 MG/DL (ref 65–99)
HBA1C MFR BLD: 5.49 % (ref 4.8–5.6)
HCT VFR BLD AUTO: 37.7 % (ref 34–46.6)
HDLC SERPL-MCNC: 47 MG/DL (ref 40–60)
HGB BLD-MCNC: 13.3 G/DL (ref 12–15.9)
LDLC SERPL CALC-MCNC: 94 MG/DL (ref 0–100)
LDLC/HDLC SERPL: 2 {RATIO}
LYMPHOCYTES # BLD AUTO: 1.6 10*3/MM3 (ref 0.7–3.1)
LYMPHOCYTES NFR BLD AUTO: 30.2 % (ref 19.6–45.3)
MCH RBC QN AUTO: 31.6 PG (ref 26.6–33)
MCHC RBC AUTO-ENTMCNC: 35.3 G/DL (ref 31.5–35.7)
MCV RBC AUTO: 89.4 FL (ref 79–97)
MONOCYTES # BLD AUTO: 0.2 10*3/MM3 (ref 0.1–0.9)
MONOCYTES NFR BLD AUTO: 3.1 % (ref 5–12)
NEUTROPHILS # BLD AUTO: 3.6 10*3/MM3 (ref 1.4–7)
NEUTROPHILS NFR BLD AUTO: 66.7 % (ref 42.7–76)
PLATELET # BLD AUTO: 291 10*3/MM3 (ref 140–450)
PMV BLD AUTO: 9 FL (ref 6–12)
POTASSIUM BLD-SCNC: 4.3 MMOL/L (ref 3.5–5.2)
PROT SERPL-MCNC: 7.3 G/DL (ref 6–8.5)
RBC # BLD AUTO: 4.22 10*6/MM3 (ref 3.77–5.28)
SODIUM BLD-SCNC: 141 MMOL/L (ref 136–145)
TRIGL SERPL-MCNC: 80 MG/DL (ref 0–150)
TSH SERPL DL<=0.05 MIU/L-ACNC: 2.39 MIU/ML (ref 0.27–4.2)
VLDLC SERPL-MCNC: 16 MG/DL (ref 5–40)
WBC NRBC COR # BLD: 5.4 10*3/MM3 (ref 3.4–10.8)

## 2019-03-01 PROCEDURE — 80053 COMPREHEN METABOLIC PANEL: CPT | Performed by: NURSE PRACTITIONER

## 2019-03-01 PROCEDURE — 83036 HEMOGLOBIN GLYCOSYLATED A1C: CPT | Performed by: NURSE PRACTITIONER

## 2019-03-01 PROCEDURE — 84443 ASSAY THYROID STIM HORMONE: CPT | Performed by: NURSE PRACTITIONER

## 2019-03-01 PROCEDURE — 80061 LIPID PANEL: CPT | Performed by: NURSE PRACTITIONER

## 2019-03-01 PROCEDURE — 99395 PREV VISIT EST AGE 18-39: CPT | Performed by: NURSE PRACTITIONER

## 2019-03-01 PROCEDURE — 85025 COMPLETE CBC W/AUTO DIFF WBC: CPT | Performed by: NURSE PRACTITIONER

## 2019-03-01 RX ORDER — BUSPIRONE HYDROCHLORIDE 10 MG/1
10 TABLET ORAL 2 TIMES DAILY
COMMUNITY
End: 2020-07-08 | Stop reason: SDUPTHER

## 2019-03-01 RX ORDER — ONDANSETRON 4 MG/1
4 TABLET, FILM COATED ORAL EVERY 8 HOURS PRN
Qty: 20 TABLET | Refills: 0 | Status: SHIPPED | OUTPATIENT
Start: 2019-03-01 | End: 2020-07-02

## 2019-03-01 RX ORDER — RIZATRIPTAN BENZOATE 5 MG/1
10 TABLET ORAL ONCE AS NEEDED
Qty: 12 TABLET | Refills: 11 | Status: SHIPPED | OUTPATIENT
Start: 2019-03-01 | End: 2019-03-04 | Stop reason: SDUPTHER

## 2019-03-01 RX ORDER — TOPIRAMATE 50 MG/1
50 TABLET, FILM COATED ORAL NIGHTLY
Qty: 30 TABLET | Refills: 3 | Status: SHIPPED | OUTPATIENT
Start: 2019-03-01 | End: 2019-04-29 | Stop reason: SDUPTHER

## 2019-03-01 RX ORDER — TOPIRAMATE 50 MG/1
50 TABLET, FILM COATED ORAL 2 TIMES DAILY
COMMUNITY
End: 2019-03-01

## 2019-03-01 RX ORDER — TOPIRAMATE 50 MG/1
50 TABLET, FILM COATED ORAL NIGHTLY
COMMUNITY
End: 2019-03-01 | Stop reason: SDUPTHER

## 2019-03-01 NOTE — PATIENT INSTRUCTIONS
Refilled topamax 50mg nightly, refilled maxalt at onset of migraine, refilled zofran as needed for nausea,   Encouraged patient to make f/u appt today with neurology as she is over due.   Cont keppra as prescribed.   Labs today will call with results .  If any worsening symptoms, HA, seizures advised to go to the ER.   Increase fluid intake, get plenty of rest.   Patient agrees with plan of care and understands instructions. Call if worsening symptoms or any problems or concerns.

## 2019-03-01 NOTE — PROGRESS NOTES
Subjective   Liya Parker is a 33 y.o. female.     History of Present Illness   Here today for biometric screening, she is fasting today, she is due for labs. She does exercise, denies smoking. She is UTD on womens health, sees Dr. Melchor, last saw in april.   With hx of migraines, states worsening migraines she went to [facility] on Monday given IM phenergan and torodol,she is taking elavil 25mg nightly, taking maxalt 10mg as needed at onset of migraines, needs refill today, she tried excedrin, she has hx of seizures, she denies any recent seizure activity, she has had nausea, vomiting, she has phenergan at home, she is taking keppra 500mg bid for seizures. She is usually on topomax 50mg but stopped about 3-4 months ago, states she has had migraines since out of this med, states she gets seizures with migraines in the past. sees neurology Dr. Franz but has not seen since 8/17.    The following portions of the patient's history were reviewed and updated as appropriate: allergies, current medications, past family history, past medical history, past social history, past surgical history and problem list.    Review of Systems   Constitutional: Negative for chills, diaphoresis and fever.   Respiratory: Negative for cough and shortness of breath.    Cardiovascular: Negative for chest pain.   Musculoskeletal: Negative for arthralgias and myalgias.   Neurological: Negative for dizziness, light-headedness and headache.   All other systems reviewed and are negative.      Objective   Physical Exam   Constitutional: She is oriented to person, place, and time. She appears well-developed and well-nourished.   HENT:   Head: Normocephalic.   Eyes: Pupils are equal, round, and reactive to light.   Neck: Normal range of motion.   Cardiovascular: Normal rate, regular rhythm and normal heart sounds.   Pulmonary/Chest: Effort normal and breath sounds normal.   Musculoskeletal: Normal range of motion.   Lymphadenopathy:     She has no  cervical adenopathy.   Neurological: She is alert and oriented to person, place, and time. She has normal strength. No cranial nerve deficit or sensory deficit. She displays a negative Romberg sign.   Skin: Skin is warm and dry.   Psychiatric: She has a normal mood and affect. Her behavior is normal.   Nursing note and vitals reviewed.        Assessment/Plan   Liya was seen today for insurance physicl.    Diagnoses and all orders for this visit:    Intractable migraine without aura and without status migrainosus  -     Lipid Panel  -     Comprehensive Metabolic Panel  -     CBC & Differential  -     TSH  -     Hemoglobin A1c  -     CBC Auto Differential    Physical exam, annual  -     rizatriptan (MAXALT) 5 MG tablet; Take 2 tablets by mouth 1 (One) Time As Needed for Migraine. May repeat in 2 hours if needed  -     Lipid Panel  -     Comprehensive Metabolic Panel  -     CBC & Differential  -     TSH  -     Hemoglobin A1c  -     CBC Auto Differential    Encounter for biometric screening  -     Lipid Panel  -     Comprehensive Metabolic Panel  -     CBC & Differential  -     TSH  -     Hemoglobin A1c  -     CBC Auto Differential    Seizures (CMS/HCC)  -     Lipid Panel  -     Comprehensive Metabolic Panel  -     CBC & Differential  -     TSH  -     Hemoglobin A1c  -     CBC Auto Differential    Dyslipidemia  -     Lipid Panel  -     Comprehensive Metabolic Panel  -     CBC & Differential  -     TSH  -     Hemoglobin A1c  -     CBC Auto Differential    Other orders  -     topiramate (TOPAMAX) 50 MG tablet; Take 1 tablet by mouth Every Night.  -     ondansetron (ZOFRAN) 4 MG tablet; Take 1 tablet by mouth Every 8 (Eight) Hours As Needed for Nausea or Vomiting.        Refilled topamax 50mg nightly, refilled maxalt at onset of migraine, refilled zofran as needed for nausea,   Cont f/u with GYN as scheduled.   Encouraged patient to make f/u appt today with neurology as she is over due.   Cont keppra as prescribed.    Labs today will call with results .  If any worsening symptoms, HA, seizures advised to go to the ER.   Increase fluid intake, get plenty of rest.   Patient agrees with plan of care and understands instructions. Call if worsening symptoms or any problems or concerns.

## 2019-03-04 DIAGNOSIS — Z00.00 PHYSICAL EXAM, ANNUAL: ICD-10-CM

## 2019-03-04 RX ORDER — TIZANIDINE HYDROCHLORIDE 4 MG/1
4 CAPSULE, GELATIN COATED ORAL 3 TIMES DAILY
Qty: 30 CAPSULE | Refills: 0 | Status: SHIPPED | OUTPATIENT
Start: 2019-03-04 | End: 2019-03-19 | Stop reason: SDUPTHER

## 2019-03-04 RX ORDER — RIZATRIPTAN BENZOATE 5 MG/1
10 TABLET ORAL ONCE AS NEEDED
Qty: 12 TABLET | Refills: 11 | Status: SHIPPED | OUTPATIENT
Start: 2019-03-04 | End: 2019-11-26 | Stop reason: SDUPTHER

## 2019-03-04 RX ORDER — PROMETHAZINE HYDROCHLORIDE 25 MG/1
25 TABLET ORAL EVERY 8 HOURS PRN
Qty: 20 TABLET | Refills: 0 | Status: SHIPPED | OUTPATIENT
Start: 2019-03-04 | End: 2019-05-01 | Stop reason: SDUPTHER

## 2019-03-09 DIAGNOSIS — Z00.00 PHYSICAL EXAM, ANNUAL: ICD-10-CM

## 2019-03-11 DIAGNOSIS — Z00.00 PHYSICAL EXAM, ANNUAL: ICD-10-CM

## 2019-03-11 RX ORDER — ZOLPIDEM TARTRATE 10 MG/1
10 TABLET ORAL
Qty: 30 TABLET | Refills: 0 | Status: SHIPPED | OUTPATIENT
Start: 2019-03-11 | End: 2019-12-24 | Stop reason: SDUPTHER

## 2019-03-11 RX ORDER — ZOLPIDEM TARTRATE 10 MG/1
10 TABLET ORAL
Qty: 30 TABLET | Refills: 3 | Status: SHIPPED | OUTPATIENT
Start: 2019-03-11 | End: 2019-07-02 | Stop reason: SDUPTHER

## 2019-03-11 RX ORDER — ZOLPIDEM TARTRATE 10 MG/1
10 TABLET ORAL
Qty: 30 TABLET | Refills: 3 | Status: SHIPPED | OUTPATIENT
Start: 2019-03-11 | End: 2019-12-24 | Stop reason: SDUPTHER

## 2019-03-12 RX ORDER — ZOLPIDEM TARTRATE 10 MG/1
TABLET ORAL
Qty: 90 TABLET | Refills: 0 | Status: SHIPPED | OUTPATIENT
Start: 2019-03-12 | End: 2019-12-24 | Stop reason: SDUPTHER

## 2019-03-19 DIAGNOSIS — Z00.00 PHYSICAL EXAM, ANNUAL: ICD-10-CM

## 2019-03-19 RX ORDER — AMITRIPTYLINE HYDROCHLORIDE 25 MG/1
25 TABLET, FILM COATED ORAL NIGHTLY PRN
Qty: 30 TABLET | Refills: 3 | Status: SHIPPED | OUTPATIENT
Start: 2019-03-19 | End: 2019-05-17 | Stop reason: SDUPTHER

## 2019-03-19 RX ORDER — TIZANIDINE 4 MG/1
TABLET ORAL
Qty: 30 TABLET | Refills: 0 | Status: SHIPPED | OUTPATIENT
Start: 2019-03-19 | End: 2019-04-25 | Stop reason: SDUPTHER

## 2019-03-19 RX ORDER — TIZANIDINE HYDROCHLORIDE 4 MG/1
4 CAPSULE, GELATIN COATED ORAL 3 TIMES DAILY
Qty: 30 CAPSULE | Refills: 0 | Status: SHIPPED | OUTPATIENT
Start: 2019-03-19 | End: 2019-04-08 | Stop reason: SDUPTHER

## 2019-03-19 RX ORDER — AMITRIPTYLINE HYDROCHLORIDE 25 MG/1
TABLET, FILM COATED ORAL
Qty: 30 TABLET | Refills: 0 | Status: SHIPPED | OUTPATIENT
Start: 2019-03-19 | End: 2019-12-24 | Stop reason: SDUPTHER

## 2019-04-08 DIAGNOSIS — Z00.00 PHYSICAL EXAM, ANNUAL: ICD-10-CM

## 2019-04-08 RX ORDER — TIZANIDINE 4 MG/1
TABLET ORAL
Qty: 30 TABLET | Refills: 0 | Status: SHIPPED | OUTPATIENT
Start: 2019-04-08 | End: 2019-04-23 | Stop reason: SDUPTHER

## 2019-04-23 DIAGNOSIS — Z00.00 PHYSICAL EXAM, ANNUAL: ICD-10-CM

## 2019-04-23 RX ORDER — TIZANIDINE 4 MG/1
TABLET ORAL
Qty: 30 TABLET | Refills: 0 | Status: SHIPPED | OUTPATIENT
Start: 2019-04-23 | End: 2019-12-24 | Stop reason: SDUPTHER

## 2019-04-25 ENCOUNTER — TELEPHONE (OUTPATIENT)
Dept: FAMILY MEDICINE CLINIC | Facility: CLINIC | Age: 34
End: 2019-04-25

## 2019-04-25 DIAGNOSIS — Z00.00 PHYSICAL EXAM, ANNUAL: ICD-10-CM

## 2019-04-25 RX ORDER — TIZANIDINE 4 MG/1
4 TABLET ORAL EVERY 8 HOURS PRN
Qty: 90 TABLET | Refills: 3 | Status: SHIPPED | OUTPATIENT
Start: 2019-04-25 | End: 2019-04-29 | Stop reason: SDUPTHER

## 2019-04-29 DIAGNOSIS — Z00.00 PHYSICAL EXAM, ANNUAL: ICD-10-CM

## 2019-04-29 RX ORDER — TOPIRAMATE 50 MG/1
50 TABLET, FILM COATED ORAL NIGHTLY
Qty: 30 TABLET | Refills: 3 | Status: SHIPPED | OUTPATIENT
Start: 2019-04-29 | End: 2019-07-01 | Stop reason: SDUPTHER

## 2019-04-29 RX ORDER — TIZANIDINE 4 MG/1
4 TABLET ORAL EVERY 8 HOURS PRN
Qty: 90 TABLET | Refills: 3 | Status: SHIPPED | OUTPATIENT
Start: 2019-04-29 | End: 2019-12-23 | Stop reason: SDUPTHER

## 2019-04-29 RX ORDER — TIZANIDINE 4 MG/1
4 TABLET ORAL EVERY 8 HOURS PRN
Qty: 90 TABLET | Refills: 3 | Status: SHIPPED | OUTPATIENT
Start: 2019-04-29 | End: 2019-12-24 | Stop reason: SDUPTHER

## 2019-04-29 RX ORDER — PANTOPRAZOLE SODIUM 40 MG/1
40 TABLET, DELAYED RELEASE ORAL DAILY
Qty: 30 TABLET | Refills: 0 | Status: SHIPPED | OUTPATIENT
Start: 2019-04-29 | End: 2020-07-08 | Stop reason: SDUPTHER

## 2019-04-29 RX ORDER — ONDANSETRON 4 MG/1
4 TABLET, FILM COATED ORAL EVERY 8 HOURS PRN
Qty: 20 TABLET | Refills: 0 | Status: CANCELLED | OUTPATIENT
Start: 2019-04-29

## 2019-05-01 DIAGNOSIS — Z00.00 PHYSICAL EXAM, ANNUAL: ICD-10-CM

## 2019-05-01 RX ORDER — PROMETHAZINE HYDROCHLORIDE 25 MG/1
25 TABLET ORAL EVERY 8 HOURS PRN
Qty: 20 TABLET | Refills: 0 | Status: SHIPPED | OUTPATIENT
Start: 2019-05-01 | End: 2020-07-02 | Stop reason: SDUPTHER

## 2019-05-02 RX ORDER — ONDANSETRON 4 MG/1
4 TABLET, FILM COATED ORAL EVERY 8 HOURS PRN
Qty: 20 TABLET | Refills: 0 | OUTPATIENT
Start: 2019-05-02

## 2019-05-02 RX ORDER — PROMETHAZINE HYDROCHLORIDE 25 MG/1
25 TABLET ORAL EVERY 8 HOURS PRN
Qty: 20 TABLET | Refills: 0 | OUTPATIENT
Start: 2019-05-02

## 2019-05-04 RX ORDER — PROMETHAZINE HYDROCHLORIDE 25 MG/1
25 TABLET ORAL EVERY 8 HOURS PRN
Qty: 20 TABLET | Refills: 0 | Status: SHIPPED | OUTPATIENT
Start: 2019-05-04 | End: 2020-07-02 | Stop reason: SDUPTHER

## 2019-05-17 DIAGNOSIS — Z00.00 PHYSICAL EXAM, ANNUAL: ICD-10-CM

## 2019-05-17 RX ORDER — AMITRIPTYLINE HYDROCHLORIDE 25 MG/1
25 TABLET, FILM COATED ORAL NIGHTLY PRN
Qty: 30 TABLET | Refills: 0 | Status: SHIPPED | OUTPATIENT
Start: 2019-05-17 | End: 2019-06-17 | Stop reason: SDUPTHER

## 2019-06-17 DIAGNOSIS — Z00.00 PHYSICAL EXAM, ANNUAL: ICD-10-CM

## 2019-06-17 RX ORDER — AMITRIPTYLINE HYDROCHLORIDE 25 MG/1
25 TABLET, FILM COATED ORAL NIGHTLY PRN
Qty: 30 TABLET | Refills: 3 | Status: SHIPPED | OUTPATIENT
Start: 2019-06-17 | End: 2019-12-24 | Stop reason: SDUPTHER

## 2019-06-17 RX ORDER — AMITRIPTYLINE HYDROCHLORIDE 25 MG/1
25 TABLET, FILM COATED ORAL NIGHTLY PRN
Qty: 30 TABLET | Refills: 0 | Status: SHIPPED | OUTPATIENT
Start: 2019-06-17 | End: 2020-04-09 | Stop reason: SDUPTHER

## 2019-07-01 DIAGNOSIS — Z00.00 PHYSICAL EXAM, ANNUAL: ICD-10-CM

## 2019-07-01 RX ORDER — TOPIRAMATE 50 MG/1
50 TABLET, FILM COATED ORAL NIGHTLY
Qty: 30 TABLET | Refills: 3 | Status: SHIPPED | OUTPATIENT
Start: 2019-07-01 | End: 2019-09-30 | Stop reason: SDUPTHER

## 2019-07-02 DIAGNOSIS — Z00.00 PHYSICAL EXAM, ANNUAL: ICD-10-CM

## 2019-07-02 RX ORDER — ZOLPIDEM TARTRATE 10 MG/1
TABLET ORAL
Qty: 90 TABLET | Refills: 0 | OUTPATIENT
Start: 2019-07-02

## 2019-07-02 RX ORDER — ZOLPIDEM TARTRATE 10 MG/1
10 TABLET ORAL
Qty: 30 TABLET | Refills: 3 | OUTPATIENT
Start: 2019-07-02

## 2019-07-02 RX ORDER — ZOLPIDEM TARTRATE 10 MG/1
10 TABLET ORAL
Qty: 30 TABLET | Refills: 2 | Status: SHIPPED | OUTPATIENT
Start: 2019-07-02 | End: 2019-09-27 | Stop reason: SDUPTHER

## 2019-09-27 DIAGNOSIS — Z00.00 PHYSICAL EXAM, ANNUAL: ICD-10-CM

## 2019-09-30 DIAGNOSIS — Z00.00 PHYSICAL EXAM, ANNUAL: ICD-10-CM

## 2019-09-30 RX ORDER — ZOLPIDEM TARTRATE 10 MG/1
10 TABLET ORAL
Qty: 30 TABLET | Refills: 2 | OUTPATIENT
Start: 2019-09-30

## 2019-09-30 RX ORDER — TOPIRAMATE 50 MG/1
50 TABLET, FILM COATED ORAL NIGHTLY
Qty: 30 TABLET | Refills: 3 | Status: SHIPPED | OUTPATIENT
Start: 2019-09-30 | End: 2020-01-26 | Stop reason: SDUPTHER

## 2019-09-30 RX ORDER — ZOLPIDEM TARTRATE 10 MG/1
10 TABLET ORAL
Qty: 30 TABLET | Refills: 0 | Status: SHIPPED | OUTPATIENT
Start: 2019-09-30 | End: 2019-11-26 | Stop reason: SDUPTHER

## 2019-11-26 DIAGNOSIS — Z00.00 PHYSICAL EXAM, ANNUAL: ICD-10-CM

## 2019-11-26 RX ORDER — ZOLPIDEM TARTRATE 10 MG/1
10 TABLET ORAL
Qty: 30 TABLET | Refills: 0 | Status: SHIPPED | OUTPATIENT
Start: 2019-11-26 | End: 2019-12-23 | Stop reason: SDUPTHER

## 2019-11-26 RX ORDER — RIZATRIPTAN BENZOATE 5 MG/1
10 TABLET ORAL ONCE AS NEEDED
Qty: 12 TABLET | Refills: 11 | Status: SHIPPED | OUTPATIENT
Start: 2019-11-26 | End: 2020-04-09 | Stop reason: SDUPTHER

## 2019-12-23 DIAGNOSIS — Z00.00 PHYSICAL EXAM, ANNUAL: ICD-10-CM

## 2019-12-24 DIAGNOSIS — Z00.00 PHYSICAL EXAM, ANNUAL: ICD-10-CM

## 2019-12-24 RX ORDER — ZOLPIDEM TARTRATE 10 MG/1
10 TABLET ORAL NIGHTLY PRN
Qty: 90 TABLET | Refills: 0 | Status: SHIPPED | OUTPATIENT
Start: 2019-12-24 | End: 2020-03-26

## 2019-12-24 RX ORDER — TIZANIDINE 4 MG/1
4 TABLET ORAL EVERY 8 HOURS PRN
Qty: 90 TABLET | Refills: 3 | Status: SHIPPED | OUTPATIENT
Start: 2019-12-24 | End: 2020-01-26 | Stop reason: SDUPTHER

## 2019-12-24 RX ORDER — TIZANIDINE 4 MG/1
4 TABLET ORAL EVERY 8 HOURS PRN
Qty: 90 TABLET | Refills: 3 | Status: SHIPPED | OUTPATIENT
Start: 2019-12-24 | End: 2019-12-24 | Stop reason: SDUPTHER

## 2019-12-24 RX ORDER — ZOLPIDEM TARTRATE 10 MG/1
10 TABLET ORAL
Qty: 15 TABLET | Refills: 0 | Status: SHIPPED | OUTPATIENT
Start: 2019-12-24 | End: 2019-12-24 | Stop reason: SDUPTHER

## 2020-01-26 DIAGNOSIS — Z00.00 PHYSICAL EXAM, ANNUAL: ICD-10-CM

## 2020-01-27 RX ORDER — TIZANIDINE 4 MG/1
4 TABLET ORAL EVERY 8 HOURS PRN
Qty: 90 TABLET | Refills: 3 | Status: SHIPPED | OUTPATIENT
Start: 2020-01-27 | End: 2020-07-08 | Stop reason: SDUPTHER

## 2020-01-27 RX ORDER — TOPIRAMATE 50 MG/1
50 TABLET, FILM COATED ORAL NIGHTLY
Qty: 30 TABLET | Refills: 3 | Status: SHIPPED | OUTPATIENT
Start: 2020-01-27 | End: 2020-05-22 | Stop reason: SDUPTHER

## 2020-03-26 DIAGNOSIS — Z00.00 ENCOUNTER FOR GENERAL ADULT MEDICAL EXAMINATION WITHOUT ABNORMAL FINDINGS: ICD-10-CM

## 2020-03-26 RX ORDER — ZOLPIDEM TARTRATE 10 MG/1
TABLET ORAL
Qty: 15 TABLET | Refills: 0 | Status: SHIPPED | OUTPATIENT
Start: 2020-03-26 | End: 2020-04-09 | Stop reason: SDUPTHER

## 2020-04-09 DIAGNOSIS — Z00.00 ENCOUNTER FOR GENERAL ADULT MEDICAL EXAMINATION WITHOUT ABNORMAL FINDINGS: ICD-10-CM

## 2020-04-09 DIAGNOSIS — Z00.00 PHYSICAL EXAM, ANNUAL: ICD-10-CM

## 2020-04-10 RX ORDER — RIZATRIPTAN BENZOATE 5 MG/1
10 TABLET ORAL ONCE AS NEEDED
Qty: 12 TABLET | Refills: 11 | Status: SHIPPED | OUTPATIENT
Start: 2020-04-10 | End: 2020-07-02 | Stop reason: SDUPTHER

## 2020-04-10 RX ORDER — AMITRIPTYLINE HYDROCHLORIDE 25 MG/1
25 TABLET, FILM COATED ORAL NIGHTLY PRN
Qty: 30 TABLET | Refills: 0 | Status: SHIPPED | OUTPATIENT
Start: 2020-04-10 | End: 2020-05-11 | Stop reason: SDUPTHER

## 2020-04-10 RX ORDER — ZOLPIDEM TARTRATE 10 MG/1
10 TABLET ORAL
Qty: 15 TABLET | Refills: 3 | Status: SHIPPED | OUTPATIENT
Start: 2020-04-10 | End: 2020-06-09 | Stop reason: SDUPTHER

## 2020-04-13 RX ORDER — ONDANSETRON 4 MG/1
4 TABLET, FILM COATED ORAL EVERY 8 HOURS PRN
Qty: 20 TABLET | Refills: 0 | Status: SHIPPED | OUTPATIENT
Start: 2020-04-13 | End: 2020-06-29

## 2020-04-17 ENCOUNTER — TELEPHONE (OUTPATIENT)
Dept: FAMILY MEDICINE CLINIC | Facility: CLINIC | Age: 35
End: 2020-04-17

## 2020-04-17 NOTE — TELEPHONE ENCOUNTER
Patient picked up samples of Ubrelvy 100mg to try for migraines, coupon also given pt to let us know if would like Rx sent in it it works.

## 2020-04-20 ENCOUNTER — TELEPHONE (OUTPATIENT)
Dept: FAMILY MEDICINE CLINIC | Facility: CLINIC | Age: 35
End: 2020-04-20

## 2020-04-20 DIAGNOSIS — Z00.00 PHYSICAL EXAM, ANNUAL: ICD-10-CM

## 2020-04-20 NOTE — TELEPHONE ENCOUNTER
PATIENT STATES THAT THE SAMPLE OF UBRELVY 100 MG WORKED AND WOULD LIKE A PRESCRIPTION CALLED IN TO [facility] [address] [address] AT [facility] [address] [place] - [phone]  - [phone] FX     PATIENT STATES THAT 50 MG DID NOT WORK SHE NEED THE FULL 100 MG. PATIENT STATES THAT SHE CURRENTLY HAS A MIGRAINE.    PLEASE ADVISE

## 2020-04-22 RX ORDER — ONDANSETRON 4 MG/1
4 TABLET, FILM COATED ORAL EVERY 8 HOURS PRN
Qty: 20 TABLET | Refills: 0 | OUTPATIENT
Start: 2020-04-22

## 2020-04-22 RX ORDER — PROMETHAZINE HYDROCHLORIDE 25 MG/1
25 TABLET ORAL EVERY 8 HOURS PRN
Qty: 20 TABLET | Refills: 0 | OUTPATIENT
Start: 2020-04-22

## 2020-04-23 ENCOUNTER — PRIOR AUTHORIZATION (OUTPATIENT)
Dept: FAMILY MEDICINE CLINIC | Facility: CLINIC | Age: 35
End: 2020-04-23

## 2020-04-29 DIAGNOSIS — Z00.00 PHYSICAL EXAM, ANNUAL: ICD-10-CM

## 2020-04-29 RX ORDER — LUBIPROSTONE 24 UG/1
24 CAPSULE ORAL
Qty: 14 CAPSULE | Refills: 0 | Status: SHIPPED | OUTPATIENT
Start: 2020-04-29 | End: 2020-07-08 | Stop reason: SDUPTHER

## 2020-05-11 DIAGNOSIS — Z00.00 PHYSICAL EXAM, ANNUAL: ICD-10-CM

## 2020-05-11 RX ORDER — AMITRIPTYLINE HYDROCHLORIDE 25 MG/1
25 TABLET, FILM COATED ORAL NIGHTLY PRN
Qty: 14 TABLET | Refills: 0 | Status: SHIPPED | OUTPATIENT
Start: 2020-05-11 | End: 2020-05-22 | Stop reason: SDUPTHER

## 2020-05-22 DIAGNOSIS — Z00.00 PHYSICAL EXAM, ANNUAL: ICD-10-CM

## 2020-05-26 DIAGNOSIS — Z00.00 PHYSICAL EXAM, ANNUAL: ICD-10-CM

## 2020-05-26 RX ORDER — AMITRIPTYLINE HYDROCHLORIDE 25 MG/1
25 TABLET, FILM COATED ORAL NIGHTLY PRN
Qty: 14 TABLET | Refills: 0 | Status: SHIPPED | OUTPATIENT
Start: 2020-05-26 | End: 2020-07-07 | Stop reason: SDUPTHER

## 2020-05-26 RX ORDER — TOPIRAMATE 50 MG/1
50 TABLET, FILM COATED ORAL NIGHTLY
Qty: 30 TABLET | Refills: 3 | Status: SHIPPED | OUTPATIENT
Start: 2020-05-26 | End: 2020-07-02

## 2020-05-26 RX ORDER — AMITRIPTYLINE HYDROCHLORIDE 25 MG/1
25 TABLET, FILM COATED ORAL NIGHTLY PRN
Qty: 14 TABLET | Refills: 0 | Status: SHIPPED | OUTPATIENT
Start: 2020-05-26 | End: 2020-06-29 | Stop reason: SDUPTHER

## 2020-06-09 DIAGNOSIS — Z00.00 ENCOUNTER FOR GENERAL ADULT MEDICAL EXAMINATION WITHOUT ABNORMAL FINDINGS: ICD-10-CM

## 2020-06-09 RX ORDER — ZOLPIDEM TARTRATE 10 MG/1
10 TABLET ORAL
Qty: 30 TABLET | Refills: 3 | Status: SHIPPED | OUTPATIENT
Start: 2020-06-09 | End: 2020-09-24 | Stop reason: SDUPTHER

## 2020-06-25 ENCOUNTER — LAB (OUTPATIENT)
Dept: FAMILY MEDICINE CLINIC | Facility: CLINIC | Age: 35
End: 2020-06-25

## 2020-06-25 DIAGNOSIS — Z13.220 LIPID SCREENING: Primary | ICD-10-CM

## 2020-06-25 DIAGNOSIS — G43.909 MIGRAINE WITHOUT STATUS MIGRAINOSUS, NOT INTRACTABLE, UNSPECIFIED MIGRAINE TYPE: ICD-10-CM

## 2020-06-25 DIAGNOSIS — Z51.81 MEDICATION MONITORING ENCOUNTER: ICD-10-CM

## 2020-06-25 LAB
ALBUMIN SERPL-MCNC: 4.5 G/DL (ref 3.5–5.2)
ALBUMIN/GLOB SERPL: 1.4 G/DL
ALP SERPL-CCNC: 62 U/L (ref 39–117)
ALT SERPL W P-5'-P-CCNC: 34 U/L (ref 1–33)
ANION GAP SERPL CALCULATED.3IONS-SCNC: 11.4 MMOL/L (ref 5–15)
AST SERPL-CCNC: 20 U/L (ref 1–32)
BILIRUB SERPL-MCNC: 0.2 MG/DL (ref 0.2–1.2)
BUN BLD-MCNC: 13 MG/DL (ref 6–20)
BUN/CREAT SERPL: 12.5 (ref 7–25)
CALCIUM SPEC-SCNC: 9.6 MG/DL (ref 8.6–10.5)
CHLORIDE SERPL-SCNC: 104 MMOL/L (ref 98–107)
CHOLEST SERPL-MCNC: 167 MG/DL (ref 0–200)
CO2 SERPL-SCNC: 24.6 MMOL/L (ref 22–29)
CREAT BLD-MCNC: 1.04 MG/DL (ref 0.57–1)
ERYTHROCYTE [DISTWIDTH] IN BLOOD BY AUTOMATED COUNT: 13 % (ref 12.3–15.4)
GFR SERPL CREATININE-BSD FRML MDRD: 73 ML/MIN/1.73
GLOBULIN UR ELPH-MCNC: 3.3 GM/DL
GLUCOSE BLD-MCNC: 92 MG/DL (ref 65–99)
HCT VFR BLD AUTO: 40.4 % (ref 34–46.6)
HDLC SERPL-MCNC: 44 MG/DL (ref 40–60)
HGB BLD-MCNC: 13.4 G/DL (ref 12–15.9)
LDLC SERPL CALC-MCNC: 106 MG/DL (ref 0–100)
LDLC/HDLC SERPL: 2.4 {RATIO}
LYMPHOCYTES # BLD AUTO: 2 10*3/MM3 (ref 0.7–3.1)
LYMPHOCYTES NFR BLD AUTO: 29.6 % (ref 19.6–45.3)
MCH RBC QN AUTO: 29.7 PG (ref 26.6–33)
MCHC RBC AUTO-ENTMCNC: 33.2 G/DL (ref 31.5–35.7)
MCV RBC AUTO: 89.5 FL (ref 79–97)
MONOCYTES # BLD AUTO: 0.3 10*3/MM3 (ref 0.1–0.9)
MONOCYTES NFR BLD AUTO: 4.6 % (ref 5–12)
NEUTROPHILS # BLD AUTO: 4.5 10*3/MM3 (ref 1.7–7)
NEUTROPHILS NFR BLD AUTO: 65.8 % (ref 42.7–76)
PLATELET # BLD AUTO: 372 10*3/MM3 (ref 140–450)
PMV BLD AUTO: 9 FL (ref 6–12)
POTASSIUM BLD-SCNC: 4.1 MMOL/L (ref 3.5–5.2)
PROT SERPL-MCNC: 7.8 G/DL (ref 6–8.5)
RBC # BLD AUTO: 4.51 10*6/MM3 (ref 3.77–5.28)
SODIUM BLD-SCNC: 140 MMOL/L (ref 136–145)
TRIGL SERPL-MCNC: 86 MG/DL (ref 0–150)
TSH SERPL DL<=0.05 MIU/L-ACNC: 2.44 UIU/ML (ref 0.27–4.2)
VLDLC SERPL-MCNC: 17.2 MG/DL (ref 5–40)
WBC NRBC COR # BLD: 6.8 10*3/MM3 (ref 3.4–10.8)

## 2020-06-25 PROCEDURE — 85025 COMPLETE CBC W/AUTO DIFF WBC: CPT | Performed by: INTERNAL MEDICINE

## 2020-06-25 PROCEDURE — 80061 LIPID PANEL: CPT | Performed by: INTERNAL MEDICINE

## 2020-06-25 PROCEDURE — 80053 COMPREHEN METABOLIC PANEL: CPT | Performed by: INTERNAL MEDICINE

## 2020-06-25 PROCEDURE — 84443 ASSAY THYROID STIM HORMONE: CPT | Performed by: INTERNAL MEDICINE

## 2020-06-25 PROCEDURE — 36415 COLL VENOUS BLD VENIPUNCTURE: CPT | Performed by: INTERNAL MEDICINE

## 2020-06-29 DIAGNOSIS — E78.5 DYSLIPIDEMIA: ICD-10-CM

## 2020-06-29 DIAGNOSIS — R79.89 ELEVATED LFTS: ICD-10-CM

## 2020-06-29 DIAGNOSIS — Z13.220 LIPID SCREENING: Primary | ICD-10-CM

## 2020-06-29 RX ORDER — ONDANSETRON 4 MG/1
4 TABLET, FILM COATED ORAL EVERY 8 HOURS PRN
Qty: 20 TABLET | Refills: 0 | Status: SHIPPED | OUTPATIENT
Start: 2020-06-29 | End: 2020-07-02

## 2020-07-02 ENCOUNTER — OFFICE VISIT (OUTPATIENT)
Dept: FAMILY MEDICINE CLINIC | Facility: CLINIC | Age: 35
End: 2020-07-02

## 2020-07-02 VITALS
WEIGHT: 177.2 LBS | SYSTOLIC BLOOD PRESSURE: 120 MMHG | DIASTOLIC BLOOD PRESSURE: 88 MMHG | HEART RATE: 103 BPM | HEIGHT: 67 IN | BODY MASS INDEX: 27.81 KG/M2 | TEMPERATURE: 98 F | OXYGEN SATURATION: 99 %

## 2020-07-02 DIAGNOSIS — Z00.00 PHYSICAL EXAM, ANNUAL: Primary | ICD-10-CM

## 2020-07-02 DIAGNOSIS — G43.019 INTRACTABLE MIGRAINE WITHOUT AURA AND WITHOUT STATUS MIGRAINOSUS: ICD-10-CM

## 2020-07-02 DIAGNOSIS — R56.9 SEIZURES (HCC): ICD-10-CM

## 2020-07-02 DIAGNOSIS — E78.5 DYSLIPIDEMIA: ICD-10-CM

## 2020-07-02 PROCEDURE — 99213 OFFICE O/P EST LOW 20 MIN: CPT | Performed by: INTERNAL MEDICINE

## 2020-07-02 RX ORDER — ACETAMINOPHEN, ASPIRIN AND CAFFEINE 250; 250; 65 MG/1; MG/1; MG/1
1 TABLET, FILM COATED ORAL
Status: ON HOLD | COMMUNITY
End: 2022-09-09

## 2020-07-02 RX ORDER — PROMETHAZINE HYDROCHLORIDE 25 MG/1
25 TABLET ORAL EVERY 8 HOURS PRN
Qty: 20 TABLET | Refills: 3 | Status: SHIPPED | OUTPATIENT
Start: 2020-07-02 | End: 2021-02-09

## 2020-07-02 RX ORDER — RIZATRIPTAN BENZOATE 5 MG/1
10 TABLET ORAL ONCE AS NEEDED
Qty: 12 TABLET | Refills: 11 | Status: SHIPPED | OUTPATIENT
Start: 2020-07-02 | End: 2021-08-02 | Stop reason: SDUPTHER

## 2020-07-02 NOTE — PROGRESS NOTES
Cong Parker is a 34 y.o. female.     History of Present Illness   Patient was seen for labs for work physical.  Triglycerides 86, HDL 44, , TSH 2.4, blood sugar 92.  Patient does have intractable headaches but is being followed by a neurologist.  Patient was advised to take her Maxalt with her CGRP medication.  Patient does have a history of seizures and also followed by her neurologist.  Her lipids have been treated with diet exercise.    Dictated utilizing Dragon dictation. If there are questions or for further clarification, please contact me.  The following portions of the patient's history were reviewed and updated as appropriate: allergies, current medications, past family history, past medical history, past social history, past surgical history and problem list.    Review of Systems   Constitutional: Negative for fatigue and fever.   HENT: Positive for congestion. Negative for trouble swallowing.    Eyes: Negative for discharge and visual disturbance.   Respiratory: Negative for choking and shortness of breath.    Cardiovascular: Negative for chest pain and palpitations.   Gastrointestinal: Negative for abdominal pain and blood in stool.   Endocrine: Negative.    Genitourinary: Negative for genital sores and hematuria.   Musculoskeletal: Negative for gait problem and joint swelling.   Skin: Negative for color change, pallor, rash and wound.   Allergic/Immunologic: Positive for environmental allergies. Negative for immunocompromised state.   Neurological: Negative for facial asymmetry and speech difficulty.   Psychiatric/Behavioral: Negative for hallucinations and suicidal ideas.       Objective   Physical Exam   Constitutional: She is oriented to person, place, and time. She appears well-developed and well-nourished.   HENT:   Head: Normocephalic and atraumatic.   Eyes: Pupils are equal, round, and reactive to light. Conjunctivae and EOM are normal.   Neck: Normal range of motion. Neck  supple.   Cardiovascular: Normal rate, regular rhythm and normal heart sounds.   Pulmonary/Chest: Effort normal and breath sounds normal.   Abdominal: Soft. Bowel sounds are normal.   Musculoskeletal: Normal range of motion.   Neurological: She is alert and oriented to person, place, and time.   Skin: Skin is warm and dry.   Psychiatric: She has a normal mood and affect. Her behavior is normal. Judgment and thought content normal.   Nursing note and vitals reviewed.      Assessment/Plan #1 refill Maxalt No. 2 consult neurologist for migraine  Diagnoses and all orders for this visit:    Physical exam, annual  -     promethazine (PHENERGAN) 25 MG tablet; Take 1 tablet by mouth Every 8 (Eight) Hours As Needed for Nausea or Vomiting.  -     rizatriptan (Maxalt) 5 MG tablet; Take 2 tablets by mouth 1 (One) Time As Needed for Migraine. May repeat in 2 hours if needed    Intractable migraine without aura and without status migrainosus    Dyslipidemia    Seizures (CMS/HCC)

## 2020-07-07 DIAGNOSIS — Z00.00 PHYSICAL EXAM, ANNUAL: ICD-10-CM

## 2020-07-08 DIAGNOSIS — Z00.00 PHYSICAL EXAM, ANNUAL: ICD-10-CM

## 2020-07-08 RX ORDER — AMITRIPTYLINE HYDROCHLORIDE 25 MG/1
25 TABLET, FILM COATED ORAL NIGHTLY PRN
Qty: 14 TABLET | Refills: 0 | Status: SHIPPED | OUTPATIENT
Start: 2020-07-08 | End: 2020-07-08

## 2020-07-08 RX ORDER — TIZANIDINE 4 MG/1
4 TABLET ORAL EVERY 8 HOURS PRN
Qty: 90 TABLET | Refills: 3 | Status: SHIPPED | OUTPATIENT
Start: 2020-07-08 | End: 2020-07-22 | Stop reason: SDUPTHER

## 2020-07-08 RX ORDER — LUBIPROSTONE 24 UG/1
24 CAPSULE ORAL
Qty: 30 CAPSULE | Refills: 3 | Status: SHIPPED | OUTPATIENT
Start: 2020-07-08 | End: 2022-09-01

## 2020-07-08 RX ORDER — PANTOPRAZOLE SODIUM 40 MG/1
40 TABLET, DELAYED RELEASE ORAL DAILY
Qty: 30 TABLET | Refills: 3 | Status: SHIPPED | OUTPATIENT
Start: 2020-07-08 | End: 2021-08-26 | Stop reason: SDUPTHER

## 2020-07-08 RX ORDER — FLUTICASONE PROPIONATE 50 MCG
2 SPRAY, SUSPENSION (ML) NASAL DAILY
Qty: 1 BOTTLE | Refills: 3 | Status: SHIPPED | OUTPATIENT
Start: 2020-07-08 | End: 2022-06-16 | Stop reason: SDUPTHER

## 2020-07-08 RX ORDER — BUSPIRONE HYDROCHLORIDE 10 MG/1
10 TABLET ORAL 2 TIMES DAILY
Qty: 60 TABLET | Refills: 3 | Status: SHIPPED | OUTPATIENT
Start: 2020-07-08 | End: 2020-12-28

## 2020-07-08 RX ORDER — AMITRIPTYLINE HYDROCHLORIDE 25 MG/1
25 TABLET, FILM COATED ORAL NIGHTLY PRN
Qty: 30 TABLET | Refills: 3 | Status: SHIPPED | OUTPATIENT
Start: 2020-07-08 | End: 2020-11-09 | Stop reason: SDUPTHER

## 2020-07-16 RX ORDER — HYDROXYZINE HYDROCHLORIDE 25 MG/1
25 TABLET, FILM COATED ORAL EVERY 8 HOURS PRN
Qty: 30 TABLET | Refills: 1 | Status: SHIPPED | OUTPATIENT
Start: 2020-07-16 | End: 2020-09-18 | Stop reason: SDUPTHER

## 2020-07-22 DIAGNOSIS — Z00.00 PHYSICAL EXAM, ANNUAL: ICD-10-CM

## 2020-07-22 RX ORDER — TIZANIDINE 4 MG/1
4 TABLET ORAL EVERY 8 HOURS PRN
Qty: 90 TABLET | Refills: 3 | Status: SHIPPED | OUTPATIENT
Start: 2020-07-22 | End: 2021-03-29 | Stop reason: SDUPTHER

## 2020-09-18 RX ORDER — HYDROXYZINE HYDROCHLORIDE 25 MG/1
25 TABLET, FILM COATED ORAL EVERY 8 HOURS PRN
Qty: 30 TABLET | Refills: 1 | Status: SHIPPED | OUTPATIENT
Start: 2020-09-18 | End: 2020-12-28

## 2020-09-24 DIAGNOSIS — Z00.00 ENCOUNTER FOR GENERAL ADULT MEDICAL EXAMINATION WITHOUT ABNORMAL FINDINGS: ICD-10-CM

## 2020-09-24 RX ORDER — ZOLPIDEM TARTRATE 10 MG/1
10 TABLET ORAL
Qty: 30 TABLET | Refills: 3 | Status: SHIPPED | OUTPATIENT
Start: 2020-09-24 | End: 2020-09-28 | Stop reason: SDUPTHER

## 2020-09-28 DIAGNOSIS — Z00.00 ENCOUNTER FOR GENERAL ADULT MEDICAL EXAMINATION WITHOUT ABNORMAL FINDINGS: ICD-10-CM

## 2020-09-28 RX ORDER — TOPIRAMATE 50 MG/1
50 TABLET, FILM COATED ORAL NIGHTLY
Qty: 30 TABLET | Refills: 3 | Status: SHIPPED | OUTPATIENT
Start: 2020-09-28 | End: 2021-09-07

## 2020-09-28 RX ORDER — TOPIRAMATE 50 MG/1
50 TABLET, FILM COATED ORAL NIGHTLY
Qty: 30 TABLET | Refills: 3 | Status: SHIPPED | OUTPATIENT
Start: 2020-09-28 | End: 2020-09-28 | Stop reason: SDUPTHER

## 2020-09-28 RX ORDER — ZOLPIDEM TARTRATE 10 MG/1
10 TABLET ORAL
Qty: 30 TABLET | Refills: 3 | Status: SHIPPED | OUTPATIENT
Start: 2020-09-28 | End: 2021-03-31 | Stop reason: SDUPTHER

## 2020-10-14 DIAGNOSIS — G43.019 INTRACTABLE MIGRAINE WITHOUT AURA AND WITHOUT STATUS MIGRAINOSUS: Primary | ICD-10-CM

## 2020-10-27 DIAGNOSIS — G43.019 INTRACTABLE MIGRAINE WITHOUT AURA AND WITHOUT STATUS MIGRAINOSUS: Primary | ICD-10-CM

## 2020-11-05 ENCOUNTER — HOSPITAL ENCOUNTER (OUTPATIENT)
Dept: CT IMAGING | Facility: HOSPITAL | Age: 35
Discharge: HOME | End: 2020-11-05
Admitting: INTERNAL MEDICINE

## 2020-11-05 DIAGNOSIS — G43.019 INTRACTABLE MIGRAINE WITHOUT AURA AND WITHOUT STATUS MIGRAINOSUS: ICD-10-CM

## 2020-11-05 PROCEDURE — 70450 CT HEAD/BRAIN W/O DYE: CPT

## 2020-11-09 DIAGNOSIS — Z00.00 PHYSICAL EXAM, ANNUAL: ICD-10-CM

## 2020-11-09 RX ORDER — AMITRIPTYLINE HYDROCHLORIDE 25 MG/1
25 TABLET, FILM COATED ORAL NIGHTLY PRN
Qty: 30 TABLET | Refills: 3 | Status: SHIPPED | OUTPATIENT
Start: 2020-11-09 | End: 2021-03-29 | Stop reason: SDUPTHER

## 2020-11-23 RX ORDER — ONDANSETRON 4 MG/1
4 TABLET, FILM COATED ORAL EVERY 8 HOURS PRN
Qty: 30 TABLET | Refills: 3 | Status: SHIPPED | OUTPATIENT
Start: 2020-11-23 | End: 2022-01-20

## 2020-12-28 RX ORDER — BUSPIRONE HYDROCHLORIDE 10 MG/1
10 TABLET ORAL 2 TIMES DAILY
Qty: 60 TABLET | Refills: 3 | Status: SHIPPED | OUTPATIENT
Start: 2020-12-28 | End: 2022-09-20 | Stop reason: SDUPTHER

## 2020-12-28 RX ORDER — HYDROXYZINE HYDROCHLORIDE 25 MG/1
TABLET, FILM COATED ORAL
Qty: 30 TABLET | Refills: 3 | Status: SHIPPED | OUTPATIENT
Start: 2020-12-28 | End: 2021-12-20

## 2020-12-28 RX ORDER — HYDROXYZINE HYDROCHLORIDE 25 MG/1
TABLET, FILM COATED ORAL
Qty: 30 TABLET | Refills: 3 | Status: SHIPPED | OUTPATIENT
Start: 2020-12-28 | End: 2021-03-29 | Stop reason: SDUPTHER

## 2020-12-28 RX ORDER — BUSPIRONE HYDROCHLORIDE 10 MG/1
10 TABLET ORAL 2 TIMES DAILY
Qty: 60 TABLET | Refills: 3 | Status: SHIPPED | OUTPATIENT
Start: 2020-12-28 | End: 2021-03-29 | Stop reason: SDUPTHER

## 2021-02-09 DIAGNOSIS — Z00.00 PHYSICAL EXAM, ANNUAL: ICD-10-CM

## 2021-02-09 RX ORDER — PROMETHAZINE HYDROCHLORIDE 25 MG/1
25 TABLET ORAL EVERY 8 HOURS PRN
Qty: 20 TABLET | Refills: 3 | Status: SHIPPED | OUTPATIENT
Start: 2021-02-09 | End: 2021-09-10

## 2021-03-29 DIAGNOSIS — Z00.00 PHYSICAL EXAM, ANNUAL: ICD-10-CM

## 2021-03-29 RX ORDER — TIZANIDINE 4 MG/1
4 TABLET ORAL EVERY 8 HOURS PRN
Qty: 90 TABLET | Refills: 3 | Status: SHIPPED | OUTPATIENT
Start: 2021-03-29 | End: 2021-03-31 | Stop reason: SDUPTHER

## 2021-03-29 RX ORDER — AMITRIPTYLINE HYDROCHLORIDE 25 MG/1
25 TABLET, FILM COATED ORAL NIGHTLY PRN
Qty: 30 TABLET | Refills: 3 | Status: SHIPPED | OUTPATIENT
Start: 2021-03-29 | End: 2021-03-31 | Stop reason: SDUPTHER

## 2021-03-31 DIAGNOSIS — Z00.00 PHYSICAL EXAM, ANNUAL: ICD-10-CM

## 2021-03-31 DIAGNOSIS — Z00.00 ENCOUNTER FOR GENERAL ADULT MEDICAL EXAMINATION WITHOUT ABNORMAL FINDINGS: ICD-10-CM

## 2021-03-31 RX ORDER — ZOLPIDEM TARTRATE 10 MG/1
10 TABLET ORAL
Qty: 30 TABLET | Refills: 3 | Status: SHIPPED | OUTPATIENT
Start: 2021-03-31 | End: 2021-07-15 | Stop reason: SDUPTHER

## 2021-03-31 RX ORDER — TIZANIDINE 4 MG/1
4 TABLET ORAL EVERY 8 HOURS PRN
Qty: 90 TABLET | Refills: 3 | Status: SHIPPED | OUTPATIENT
Start: 2021-03-31 | End: 2021-10-18

## 2021-03-31 RX ORDER — AMITRIPTYLINE HYDROCHLORIDE 25 MG/1
25 TABLET, FILM COATED ORAL NIGHTLY PRN
Qty: 30 TABLET | Refills: 3 | Status: SHIPPED | OUTPATIENT
Start: 2021-03-31 | End: 2021-12-07

## 2021-04-16 ENCOUNTER — BULK ORDERING (OUTPATIENT)
Dept: CASE MANAGEMENT | Facility: OTHER | Age: 36
End: 2021-04-16

## 2021-04-16 DIAGNOSIS — Z23 IMMUNIZATION DUE: ICD-10-CM

## 2021-06-15 DIAGNOSIS — R05.9 COUGH: Primary | ICD-10-CM

## 2021-06-15 RX ORDER — GUAIFENESIN AND CODEINE PHOSPHATE 100; 10 MG/5ML; MG/5ML
5 SOLUTION ORAL 3 TIMES DAILY PRN
Qty: 118 ML | Refills: 0 | Status: SHIPPED | OUTPATIENT
Start: 2021-06-15 | End: 2021-08-26

## 2021-06-23 ENCOUNTER — TELEPHONE (OUTPATIENT)
Dept: PEDIATRICS | Facility: OTHER | Age: 36
End: 2021-06-23

## 2021-06-23 NOTE — TELEPHONE ENCOUNTER
Caller: Liya Parker    Relationship to patient: Self    Best call back number: [phone]    Type of visit: LAB    Additional notes:PATIENT HAS PHYSICAL SCHEDULED 8-26-21, WOULD LIKE TO SCHEDULE LABS FOR THIS APPT

## 2021-07-13 DIAGNOSIS — R30.0 DYSURIA: Primary | ICD-10-CM

## 2021-07-13 RX ORDER — NITROFURANTOIN 25; 75 MG/1; MG/1
100 CAPSULE ORAL 2 TIMES DAILY
Qty: 14 CAPSULE | Refills: 0 | Status: SHIPPED | OUTPATIENT
Start: 2021-07-13 | End: 2021-08-26

## 2021-07-15 DIAGNOSIS — Z00.00 ENCOUNTER FOR GENERAL ADULT MEDICAL EXAMINATION WITHOUT ABNORMAL FINDINGS: ICD-10-CM

## 2021-07-16 RX ORDER — ZOLPIDEM TARTRATE 10 MG/1
10 TABLET ORAL
Qty: 30 TABLET | Refills: 3 | Status: SHIPPED | OUTPATIENT
Start: 2021-07-16 | End: 2021-11-29

## 2021-08-02 DIAGNOSIS — Z00.00 PHYSICAL EXAM, ANNUAL: ICD-10-CM

## 2021-08-02 RX ORDER — RIZATRIPTAN BENZOATE 5 MG/1
10 TABLET ORAL ONCE AS NEEDED
Qty: 12 TABLET | Refills: 0 | Status: SHIPPED | OUTPATIENT
Start: 2021-08-02 | End: 2022-06-16 | Stop reason: SDUPTHER

## 2021-08-19 ENCOUNTER — LAB (OUTPATIENT)
Dept: FAMILY MEDICINE CLINIC | Facility: CLINIC | Age: 36
End: 2021-08-19

## 2021-08-19 DIAGNOSIS — Z13.220 LIPID SCREENING: Primary | ICD-10-CM

## 2021-08-19 DIAGNOSIS — G43.019 INTRACTABLE MIGRAINE WITHOUT AURA AND WITHOUT STATUS MIGRAINOSUS: ICD-10-CM

## 2021-08-19 DIAGNOSIS — Z13.220 LIPID SCREENING: ICD-10-CM

## 2021-08-19 DIAGNOSIS — R79.89 ELEVATED LFTS: ICD-10-CM

## 2021-08-19 LAB
25(OH)D3 SERPL-MCNC: 36.2 NG/ML (ref 30–100)
ALBUMIN SERPL-MCNC: 4.4 G/DL (ref 3.5–5.2)
ALBUMIN/GLOB SERPL: 1.5 G/DL
ALP SERPL-CCNC: 66 U/L (ref 39–117)
ALT SERPL W P-5'-P-CCNC: 28 U/L (ref 1–33)
ANION GAP SERPL CALCULATED.3IONS-SCNC: 10.9 MMOL/L (ref 5–15)
AST SERPL-CCNC: 19 U/L (ref 1–32)
BILIRUB SERPL-MCNC: 0.2 MG/DL (ref 0–1.2)
BUN SERPL-MCNC: 12 MG/DL (ref 6–20)
BUN/CREAT SERPL: 12.6 (ref 7–25)
CALCIUM SPEC-SCNC: 9.8 MG/DL (ref 8.6–10.5)
CHLORIDE SERPL-SCNC: 106 MMOL/L (ref 98–107)
CHOLEST SERPL-MCNC: 188 MG/DL (ref 0–200)
CO2 SERPL-SCNC: 22.1 MMOL/L (ref 22–29)
CREAT SERPL-MCNC: 0.95 MG/DL (ref 0.57–1)
DEPRECATED RDW RBC AUTO: 41.6 FL (ref 37–54)
ERYTHROCYTE [DISTWIDTH] IN BLOOD BY AUTOMATED COUNT: 13 % (ref 12.3–15.4)
GFR SERPL CREATININE-BSD FRML MDRD: 80 ML/MIN/1.73
GLOBULIN UR ELPH-MCNC: 2.9 GM/DL
GLUCOSE SERPL-MCNC: 86 MG/DL (ref 65–99)
HCT VFR BLD AUTO: 41.2 % (ref 34–46.6)
HDLC SERPL-MCNC: 52 MG/DL (ref 40–60)
HGB BLD-MCNC: 13.7 G/DL (ref 12–15.9)
LDLC SERPL CALC-MCNC: 118 MG/DL (ref 0–100)
LDLC/HDLC SERPL: 2.23 {RATIO}
MCH RBC QN AUTO: 29.3 PG (ref 26.6–33)
MCHC RBC AUTO-ENTMCNC: 33.3 G/DL (ref 31.5–35.7)
MCV RBC AUTO: 88.2 FL (ref 79–97)
PLATELET # BLD AUTO: 355 10*3/MM3 (ref 140–450)
PMV BLD AUTO: 10.2 FL (ref 6–12)
POTASSIUM SERPL-SCNC: 4.1 MMOL/L (ref 3.5–5.2)
PROT SERPL-MCNC: 7.3 G/DL (ref 6–8.5)
RBC # BLD AUTO: 4.67 10*6/MM3 (ref 3.77–5.28)
SODIUM SERPL-SCNC: 139 MMOL/L (ref 136–145)
TRIGL SERPL-MCNC: 101 MG/DL (ref 0–150)
VLDLC SERPL-MCNC: 18 MG/DL (ref 5–40)
WBC # BLD AUTO: 6.4 10*3/MM3 (ref 3.4–10.8)

## 2021-08-19 PROCEDURE — 82306 VITAMIN D 25 HYDROXY: CPT | Performed by: INTERNAL MEDICINE

## 2021-08-19 PROCEDURE — 36415 COLL VENOUS BLD VENIPUNCTURE: CPT | Performed by: INTERNAL MEDICINE

## 2021-08-19 PROCEDURE — 80061 LIPID PANEL: CPT | Performed by: INTERNAL MEDICINE

## 2021-08-19 PROCEDURE — 85027 COMPLETE CBC AUTOMATED: CPT | Performed by: INTERNAL MEDICINE

## 2021-08-19 PROCEDURE — 80053 COMPREHEN METABOLIC PANEL: CPT | Performed by: INTERNAL MEDICINE

## 2021-08-26 ENCOUNTER — OFFICE VISIT (OUTPATIENT)
Dept: FAMILY MEDICINE CLINIC | Facility: CLINIC | Age: 36
End: 2021-08-26

## 2021-08-26 VITALS
OXYGEN SATURATION: 98 % | TEMPERATURE: 98.4 F | BODY MASS INDEX: 27.34 KG/M2 | HEIGHT: 67 IN | HEART RATE: 97 BPM | SYSTOLIC BLOOD PRESSURE: 120 MMHG | DIASTOLIC BLOOD PRESSURE: 76 MMHG | WEIGHT: 174.2 LBS

## 2021-08-26 DIAGNOSIS — G43.001 MIGRAINE WITHOUT AURA AND WITH STATUS MIGRAINOSUS, NOT INTRACTABLE: ICD-10-CM

## 2021-08-26 DIAGNOSIS — Z00.00 PHYSICAL EXAM, ANNUAL: Primary | ICD-10-CM

## 2021-08-26 PROCEDURE — 99395 PREV VISIT EST AGE 18-39: CPT | Performed by: INTERNAL MEDICINE

## 2021-08-26 RX ORDER — PANTOPRAZOLE SODIUM 40 MG/1
40 TABLET, DELAYED RELEASE ORAL DAILY
Qty: 30 TABLET | Refills: 3 | Status: SHIPPED | OUTPATIENT
Start: 2021-08-26 | End: 2022-02-16

## 2021-08-26 RX ORDER — NAPROXEN 500 MG/1
500 TABLET ORAL 2 TIMES DAILY WITH MEALS
Qty: 60 TABLET | Refills: 3 | Status: ON HOLD | OUTPATIENT
Start: 2021-08-26 | End: 2022-09-09

## 2021-08-26 RX ORDER — ALBUTEROL SULFATE 90 UG/1
AEROSOL, METERED RESPIRATORY (INHALATION)
Status: ON HOLD | COMMUNITY
Start: 2021-06-02 | End: 2022-09-09

## 2021-08-26 RX ORDER — CYCLOBENZAPRINE HCL 10 MG
10 TABLET ORAL 3 TIMES DAILY PRN
Qty: 30 TABLET | Refills: 3 | Status: SHIPPED | OUTPATIENT
Start: 2021-08-26 | End: 2022-02-16

## 2021-08-26 NOTE — PROGRESS NOTES
Cong Parker is a 35 y.o. female.     Vitals:    08/26/21 1118   BP: 120/76   Pulse: 97   Temp: 98.4 °F (36.9 °C)   SpO2: 98%      Body mass index is 27.28 kg/m².     History of Present Illness   Patient was seen for physical.  Patient's diet and physical activity were discussed at this visit.  Patient states that her migraines have worsened over the past month.  Patient states her present treatment is not working.  Patient was given Nurtec being referred to a neurologist.    Dictated utilizing Dragon dictation. If there are questions or for further clarification, please contact me.  The following portions of the patient's history were reviewed and updated as appropriate: allergies, current medications, past family history, past medical history, past social history, past surgical history and problem list.    Review of Systems   Constitutional: Negative for fatigue and fever.   HENT: Positive for congestion. Negative for trouble swallowing.    Eyes: Negative for discharge and visual disturbance.   Respiratory: Negative for choking and shortness of breath.    Cardiovascular: Negative for chest pain and palpitations.   Gastrointestinal: Negative for abdominal pain and blood in stool.   Endocrine: Negative.    Genitourinary: Negative for genital sores and hematuria.   Musculoskeletal: Negative for gait problem and joint swelling.   Skin: Negative for color change, pallor, rash and wound.   Allergic/Immunologic: Positive for environmental allergies. Negative for immunocompromised state.   Neurological: Positive for headaches. Negative for facial asymmetry and speech difficulty.   Psychiatric/Behavioral: Negative for hallucinations and suicidal ideas.       Objective   Physical Exam    Assessment/Plan 1 physical #2 labs #3 neurological consult #4 Nurtec 75 mg p.o. daily as needed migraine  Problems Addressed this Visit     None      Visit Diagnoses     Physical exam, annual    -  Primary    Relevant  Medications    pantoprazole (PROTONIX) 40 MG EC tablet    Migraine without aura and with status migrainosus, not intractable        Relevant Medications    Rimegepant Sulfate (NURTEC) 75 MG tablet dispersible tablet    naproxen (Naprosyn) 500 MG tablet    cyclobenzaprine (FLEXERIL) 10 MG tablet    Other Relevant Orders    Ambulatory Referral to Neurology      Diagnoses     Diagnosis Codes Comments    Physical exam, annual    -  Primary ICD-10-CM: Z00.00  ICD-9-CM: V70.0     Migraine without aura and with status migrainosus, not intractable     ICD-10-CM: G43.001  ICD-9-CM: 346.12

## 2021-09-07 RX ORDER — TOPIRAMATE 50 MG/1
50 TABLET, FILM COATED ORAL NIGHTLY
Qty: 30 TABLET | Refills: 3 | Status: SHIPPED | OUTPATIENT
Start: 2021-09-07

## 2021-09-10 DIAGNOSIS — Z00.00 PHYSICAL EXAM, ANNUAL: ICD-10-CM

## 2021-09-10 RX ORDER — PROMETHAZINE HYDROCHLORIDE 25 MG/1
25 TABLET ORAL EVERY 8 HOURS PRN
Qty: 20 TABLET | Refills: 3 | Status: SHIPPED | OUTPATIENT
Start: 2021-09-10 | End: 2022-04-20

## 2021-10-17 DIAGNOSIS — Z00.00 PHYSICAL EXAM, ANNUAL: ICD-10-CM

## 2021-10-18 DIAGNOSIS — Z00.00 PHYSICAL EXAM, ANNUAL: ICD-10-CM

## 2021-10-18 RX ORDER — TIZANIDINE 4 MG/1
4 TABLET ORAL EVERY 8 HOURS PRN
Qty: 90 TABLET | Refills: 3 | Status: SHIPPED | OUTPATIENT
Start: 2021-10-18 | End: 2022-03-25

## 2021-10-18 RX ORDER — TIZANIDINE 4 MG/1
TABLET ORAL
Qty: 90 TABLET | Refills: 3 | Status: SHIPPED | OUTPATIENT
Start: 2021-10-18 | End: 2021-10-18

## 2021-11-29 DIAGNOSIS — Z00.00 ENCOUNTER FOR GENERAL ADULT MEDICAL EXAMINATION WITHOUT ABNORMAL FINDINGS: ICD-10-CM

## 2021-11-29 RX ORDER — ZOLPIDEM TARTRATE 10 MG/1
TABLET ORAL
Qty: 30 TABLET | Refills: 3 | Status: SHIPPED | OUTPATIENT
Start: 2021-11-29 | End: 2022-03-21

## 2021-12-07 DIAGNOSIS — Z00.00 PHYSICAL EXAM, ANNUAL: ICD-10-CM

## 2021-12-07 RX ORDER — AMITRIPTYLINE HYDROCHLORIDE 25 MG/1
TABLET, FILM COATED ORAL
Qty: 30 TABLET | Refills: 3 | Status: SHIPPED | OUTPATIENT
Start: 2021-12-07 | End: 2022-09-01 | Stop reason: DRUGHIGH

## 2021-12-20 RX ORDER — HYDROXYZINE HYDROCHLORIDE 25 MG/1
TABLET, FILM COATED ORAL
Qty: 30 TABLET | Refills: 3 | Status: SHIPPED | OUTPATIENT
Start: 2021-12-20 | End: 2022-06-16 | Stop reason: SDUPTHER

## 2022-01-20 RX ORDER — ONDANSETRON 4 MG/1
4 TABLET, FILM COATED ORAL EVERY 8 HOURS PRN
Qty: 30 TABLET | Refills: 3 | Status: SHIPPED | OUTPATIENT
Start: 2022-01-20 | End: 2022-06-16 | Stop reason: SDUPTHER

## 2022-02-15 DIAGNOSIS — Z00.00 PHYSICAL EXAM, ANNUAL: ICD-10-CM

## 2022-02-16 DIAGNOSIS — Z00.00 PHYSICAL EXAM, ANNUAL: ICD-10-CM

## 2022-02-16 RX ORDER — PANTOPRAZOLE SODIUM 40 MG/1
TABLET, DELAYED RELEASE ORAL
Qty: 30 TABLET | Refills: 3 | Status: SHIPPED | OUTPATIENT
Start: 2022-02-16 | End: 2022-02-16 | Stop reason: SDUPTHER

## 2022-02-16 RX ORDER — PANTOPRAZOLE SODIUM 40 MG/1
40 TABLET, DELAYED RELEASE ORAL DAILY
Qty: 30 TABLET | Refills: 3 | Status: SHIPPED | OUTPATIENT
Start: 2022-02-16 | End: 2022-06-16 | Stop reason: SDUPTHER

## 2022-02-16 RX ORDER — CYCLOBENZAPRINE HCL 10 MG
10 TABLET ORAL 3 TIMES DAILY PRN
Qty: 30 TABLET | Refills: 3 | Status: SHIPPED | OUTPATIENT
Start: 2022-02-16 | End: 2022-02-16 | Stop reason: SDUPTHER

## 2022-02-16 RX ORDER — CYCLOBENZAPRINE HCL 10 MG
10 TABLET ORAL 3 TIMES DAILY PRN
Qty: 30 TABLET | Refills: 3 | Status: SHIPPED | OUTPATIENT
Start: 2022-02-16 | End: 2022-06-16 | Stop reason: SDUPTHER

## 2022-03-20 DIAGNOSIS — Z00.00 ENCOUNTER FOR GENERAL ADULT MEDICAL EXAMINATION WITHOUT ABNORMAL FINDINGS: ICD-10-CM

## 2022-03-21 DIAGNOSIS — Z00.00 ENCOUNTER FOR GENERAL ADULT MEDICAL EXAMINATION WITHOUT ABNORMAL FINDINGS: ICD-10-CM

## 2022-03-21 RX ORDER — ZOLPIDEM TARTRATE 10 MG/1
TABLET ORAL
Qty: 30 TABLET | Refills: 3 | Status: SHIPPED | OUTPATIENT
Start: 2022-03-21 | End: 2022-03-22

## 2022-03-22 DIAGNOSIS — Z00.00 ENCOUNTER FOR GENERAL ADULT MEDICAL EXAMINATION WITHOUT ABNORMAL FINDINGS: ICD-10-CM

## 2022-03-22 RX ORDER — ZOLPIDEM TARTRATE 10 MG/1
TABLET ORAL
Qty: 30 TABLET | Refills: 3 | Status: SHIPPED | OUTPATIENT
Start: 2022-03-22 | End: 2022-09-20 | Stop reason: SDUPTHER

## 2022-03-25 DIAGNOSIS — Z00.00 PHYSICAL EXAM, ANNUAL: ICD-10-CM

## 2022-03-25 RX ORDER — TIZANIDINE 4 MG/1
TABLET ORAL
Qty: 90 TABLET | Refills: 0 | Status: SHIPPED | OUTPATIENT
Start: 2022-03-25 | End: 2022-04-20

## 2022-04-20 DIAGNOSIS — Z00.00 PHYSICAL EXAM, ANNUAL: ICD-10-CM

## 2022-04-20 RX ORDER — PROMETHAZINE HYDROCHLORIDE 25 MG/1
25 TABLET ORAL EVERY 8 HOURS PRN
Qty: 20 TABLET | Refills: 0 | Status: SHIPPED | OUTPATIENT
Start: 2022-04-20 | End: 2022-06-16

## 2022-04-20 RX ORDER — TIZANIDINE 4 MG/1
TABLET ORAL
Qty: 60 TABLET | Refills: 0 | Status: SHIPPED | OUTPATIENT
Start: 2022-04-20 | End: 2022-05-09

## 2022-05-09 DIAGNOSIS — Z00.00 PHYSICAL EXAM, ANNUAL: ICD-10-CM

## 2022-05-10 RX ORDER — TIZANIDINE 4 MG/1
TABLET ORAL
Qty: 60 TABLET | Refills: 4 | Status: SHIPPED | OUTPATIENT
Start: 2022-05-10 | End: 2022-06-16 | Stop reason: SDUPTHER

## 2022-06-16 DIAGNOSIS — Z00.00 PHYSICAL EXAM, ANNUAL: ICD-10-CM

## 2022-06-16 RX ORDER — PANTOPRAZOLE SODIUM 40 MG/1
40 TABLET, DELAYED RELEASE ORAL DAILY
Qty: 30 TABLET | Refills: 3 | Status: SHIPPED | OUTPATIENT
Start: 2022-06-16 | End: 2022-09-20 | Stop reason: SDUPTHER

## 2022-06-16 RX ORDER — PROMETHAZINE HYDROCHLORIDE 25 MG/1
25 TABLET ORAL EVERY 8 HOURS PRN
Qty: 20 TABLET | Refills: 0 | Status: SHIPPED | OUTPATIENT
Start: 2022-06-16 | End: 2022-09-20 | Stop reason: SDUPTHER

## 2022-06-16 RX ORDER — TIZANIDINE 4 MG/1
4 TABLET ORAL EVERY 8 HOURS PRN
Qty: 90 TABLET | Refills: 4 | Status: SHIPPED | OUTPATIENT
Start: 2022-06-16 | End: 2022-09-14

## 2022-06-16 RX ORDER — RIZATRIPTAN BENZOATE 5 MG/1
10 TABLET ORAL ONCE AS NEEDED
Qty: 12 TABLET | Refills: 0 | Status: SHIPPED | OUTPATIENT
Start: 2022-06-16 | End: 2022-09-20 | Stop reason: SDUPTHER

## 2022-06-16 RX ORDER — FLUTICASONE PROPIONATE 50 MCG
2 SPRAY, SUSPENSION (ML) NASAL DAILY
Qty: 18.2 ML | Refills: 3 | Status: SHIPPED | OUTPATIENT
Start: 2022-06-16 | End: 2022-09-20 | Stop reason: SDUPTHER

## 2022-06-16 RX ORDER — PROMETHAZINE HYDROCHLORIDE 25 MG/1
25 TABLET ORAL EVERY 8 HOURS PRN
Qty: 30 TABLET | Refills: 3 | Status: SHIPPED | OUTPATIENT
Start: 2022-06-16 | End: 2022-09-15

## 2022-06-16 RX ORDER — CYCLOBENZAPRINE HCL 10 MG
10 TABLET ORAL 3 TIMES DAILY PRN
Qty: 30 TABLET | Refills: 3 | Status: ON HOLD | OUTPATIENT
Start: 2022-06-16 | End: 2022-09-09

## 2022-06-16 RX ORDER — HYDROXYZINE HYDROCHLORIDE 25 MG/1
25 TABLET, FILM COATED ORAL EVERY 8 HOURS PRN
Qty: 30 TABLET | Refills: 3 | Status: SHIPPED | OUTPATIENT
Start: 2022-06-16 | End: 2022-09-20 | Stop reason: SDUPTHER

## 2022-06-16 RX ORDER — ONDANSETRON 4 MG/1
4 TABLET, FILM COATED ORAL EVERY 8 HOURS PRN
Qty: 30 TABLET | Refills: 3 | Status: SHIPPED | OUTPATIENT
Start: 2022-06-16 | End: 2022-09-20 | Stop reason: SDUPTHER

## 2022-09-01 ENCOUNTER — OFFICE VISIT (OUTPATIENT)
Dept: FAMILY MEDICINE CLINIC | Facility: CLINIC | Age: 37
End: 2022-09-01

## 2022-09-01 VITALS
OXYGEN SATURATION: 98 % | BODY MASS INDEX: 28.22 KG/M2 | WEIGHT: 179.8 LBS | HEIGHT: 67 IN | HEART RATE: 114 BPM | DIASTOLIC BLOOD PRESSURE: 80 MMHG | TEMPERATURE: 97.5 F | SYSTOLIC BLOOD PRESSURE: 112 MMHG

## 2022-09-01 DIAGNOSIS — Z00.00 PHYSICAL EXAM, ANNUAL: Primary | ICD-10-CM

## 2022-09-01 DIAGNOSIS — R00.0 TACHYCARDIA: ICD-10-CM

## 2022-09-01 DIAGNOSIS — R06.02 SOB (SHORTNESS OF BREATH): ICD-10-CM

## 2022-09-01 LAB
25(OH)D3 SERPL-MCNC: 28.6 NG/ML (ref 30–100)
ALBUMIN SERPL-MCNC: 4.4 G/DL (ref 3.5–5.2)
ALBUMIN/GLOB SERPL: 1.4 G/DL
ALP SERPL-CCNC: 84 U/L (ref 39–117)
ALT SERPL W P-5'-P-CCNC: 56 U/L (ref 1–33)
ANION GAP SERPL CALCULATED.3IONS-SCNC: 8.8 MMOL/L (ref 5–15)
AST SERPL-CCNC: 30 U/L (ref 1–32)
BILIRUB SERPL-MCNC: 0.2 MG/DL (ref 0–1.2)
BUN SERPL-MCNC: 9 MG/DL (ref 6–20)
BUN/CREAT SERPL: 10.7 (ref 7–25)
CALCIUM SPEC-SCNC: 9.7 MG/DL (ref 8.6–10.5)
CHLORIDE SERPL-SCNC: 105 MMOL/L (ref 98–107)
CHOLEST SERPL-MCNC: 201 MG/DL (ref 0–200)
CO2 SERPL-SCNC: 23.2 MMOL/L (ref 22–29)
CREAT SERPL-MCNC: 0.84 MG/DL (ref 0.57–1)
DEPRECATED RDW RBC AUTO: 42.8 FL (ref 37–54)
EGFRCR SERPLBLD CKD-EPI 2021: 90.8 ML/MIN/1.73
ERYTHROCYTE [DISTWIDTH] IN BLOOD BY AUTOMATED COUNT: 13.3 % (ref 12.3–15.4)
GLOBULIN UR ELPH-MCNC: 3.2 GM/DL
GLUCOSE SERPL-MCNC: 78 MG/DL (ref 65–99)
HCT VFR BLD AUTO: 41.5 % (ref 34–46.6)
HDLC SERPL-MCNC: 60 MG/DL (ref 40–60)
HGB BLD-MCNC: 13.9 G/DL (ref 12–15.9)
LDLC SERPL CALC-MCNC: 112 MG/DL (ref 0–100)
LDLC/HDLC SERPL: 1.8 {RATIO}
MCH RBC QN AUTO: 29.7 PG (ref 26.6–33)
MCHC RBC AUTO-ENTMCNC: 33.5 G/DL (ref 31.5–35.7)
MCV RBC AUTO: 88.7 FL (ref 79–97)
PLATELET # BLD AUTO: 333 10*3/MM3 (ref 140–450)
PMV BLD AUTO: 10.3 FL (ref 6–12)
POTASSIUM SERPL-SCNC: 4.1 MMOL/L (ref 3.5–5.2)
PROT SERPL-MCNC: 7.6 G/DL (ref 6–8.5)
RBC # BLD AUTO: 4.68 10*6/MM3 (ref 3.77–5.28)
SODIUM SERPL-SCNC: 137 MMOL/L (ref 136–145)
T-UPTAKE NFR SERPL: 1.07 TBI (ref 0.8–1.3)
T4 SERPL-MCNC: 7.56 MCG/DL (ref 4.5–11.7)
TRIGL SERPL-MCNC: 164 MG/DL (ref 0–150)
TSH SERPL DL<=0.05 MIU/L-ACNC: 2.39 UIU/ML (ref 0.27–4.2)
VLDLC SERPL-MCNC: 29 MG/DL (ref 5–40)
WBC NRBC COR # BLD: 8.91 10*3/MM3 (ref 3.4–10.8)

## 2022-09-01 PROCEDURE — 99395 PREV VISIT EST AGE 18-39: CPT | Performed by: INTERNAL MEDICINE

## 2022-09-01 PROCEDURE — 84436 ASSAY OF TOTAL THYROXINE: CPT | Performed by: INTERNAL MEDICINE

## 2022-09-01 PROCEDURE — 80050 GENERAL HEALTH PANEL: CPT | Performed by: INTERNAL MEDICINE

## 2022-09-01 PROCEDURE — 99213 OFFICE O/P EST LOW 20 MIN: CPT | Performed by: INTERNAL MEDICINE

## 2022-09-01 PROCEDURE — 84479 ASSAY OF THYROID (T3 OR T4): CPT | Performed by: INTERNAL MEDICINE

## 2022-09-01 PROCEDURE — 82306 VITAMIN D 25 HYDROXY: CPT | Performed by: INTERNAL MEDICINE

## 2022-09-01 PROCEDURE — 93000 ELECTROCARDIOGRAM COMPLETE: CPT | Performed by: INTERNAL MEDICINE

## 2022-09-01 PROCEDURE — 80061 LIPID PANEL: CPT | Performed by: INTERNAL MEDICINE

## 2022-09-01 RX ORDER — CARVEDILOL 3.12 MG/1
3.12 TABLET ORAL 2 TIMES DAILY WITH MEALS
Qty: 60 TABLET | Refills: 3 | Status: SHIPPED | OUTPATIENT
Start: 2022-09-01 | End: 2022-09-20 | Stop reason: SDUPTHER

## 2022-09-01 RX ORDER — AMITRIPTYLINE HYDROCHLORIDE 75 MG/1
1 TABLET, FILM COATED ORAL NIGHTLY
COMMUNITY
Start: 2022-06-07

## 2022-09-01 RX ORDER — BRIVARACETAM 100 MG/1
1 TABLET, FILM COATED ORAL 2 TIMES DAILY
COMMUNITY
Start: 2022-06-07

## 2022-09-01 NOTE — PROGRESS NOTES
"Chief Complaint  Annual Exam (Sob  sometimes with walking and high pulse) and needs labs she is fasting    Subjective        Liya Carlson presents to [facility]  History of Present Illness patient was seen for a physical.  Patient diet physical activity were discussed at this visit.  Patient did describe a tachycardia with a pulse rate of 110 by her watch and was short of breath.  KG showed a sinus tachycardia and patient is getting a chest x-ray at the hospital.  Patient is being referred to cardiology for evaluation.  She was placed on Coreg 3.125 mg twice daily.    Objective   Vital Signs:  Blood Pressure 112/80   Pulse 114   Temperature 97.5 °F (36.4 °C)   Height 170.2 cm (67\")   Weight 81.6 kg (179 lb 12.8 oz)   Oxygen Saturation 98%   Body Mass Index 28.16 kg/m²   Estimated body mass index is 28.16 kg/m² as calculated from the following:    Height as of this encounter: 170.2 cm (67\").    Weight as of this encounter: 81.6 kg (179 lb 12.8 oz).          Physical Exam  Vitals and nursing note reviewed.   Constitutional:       General: She is not in acute distress.     Appearance: Normal appearance. She is well-developed. She is not ill-appearing, toxic-appearing or diaphoretic.   HENT:      Head: Normocephalic and atraumatic.      Right Ear: Tympanic membrane, ear canal and external ear normal. There is no impacted cerumen.      Left Ear: Tympanic membrane, ear canal and external ear normal. There is no impacted cerumen.      Nose: Nose normal. No congestion or rhinorrhea.      Mouth/Throat:      Mouth: Mucous membranes are moist.      Pharynx: Oropharynx is clear. No oropharyngeal exudate or posterior oropharyngeal erythema.   Eyes:      General: No scleral icterus.        Right eye: No discharge.         Left eye: No discharge.      Extraocular Movements: Extraocular movements intact.      Conjunctiva/sclera: Conjunctivae normal.      Pupils: Pupils are equal, round, and " reactive to light.   Neck:      Thyroid: No thyromegaly.      Vascular: No carotid bruit or JVD.      Trachea: No tracheal deviation.   Cardiovascular:      Rate and Rhythm: Regular rhythm. Tachycardia present.      Heart sounds: Normal heart sounds. No murmur heard.    No friction rub. No gallop.   Pulmonary:      Effort: Pulmonary effort is normal. No respiratory distress.      Breath sounds: Normal breath sounds. No stridor. No wheezing, rhonchi or rales.   Chest:      Chest wall: No tenderness.   Abdominal:      General: Bowel sounds are normal. There is no distension.      Palpations: Abdomen is soft. There is no mass.      Tenderness: There is no abdominal tenderness. There is no right CVA tenderness, left CVA tenderness, guarding or rebound.      Hernia: No hernia is present.   Musculoskeletal:         General: No swelling, tenderness, deformity or signs of injury. Normal range of motion.      Cervical back: Normal range of motion and neck supple. No rigidity. No muscular tenderness.      Right lower leg: No edema.      Left lower leg: No edema.   Lymphadenopathy:      Cervical: No cervical adenopathy.   Skin:     General: Skin is warm and dry.      Coloration: Skin is not jaundiced or pale.      Findings: No bruising, erythema, lesion or rash.   Neurological:      General: No focal deficit present.      Mental Status: She is alert and oriented to person, place, and time. Mental status is at baseline.      Cranial Nerves: No cranial nerve deficit.      Sensory: No sensory deficit.      Motor: No weakness or abnormal muscle tone.      Coordination: Coordination normal.      Gait: Gait normal.      Deep Tendon Reflexes: Reflexes normal.   Psychiatric:         Mood and Affect: Mood normal.         Behavior: Behavior normal.         Thought Content: Thought content normal.         Judgment: Judgment normal.        Result Review :                Assessment and Plan   Diagnoses and all orders for this visit:    1.  Physical exam, annual (Primary)  -     CBC (No Diff); Future  -     Comprehensive Metabolic Panel; Future  -     Lipid Panel; Future  -     Vitamin D 25 Hydroxy; Future    2. Tachycardia  -     Thyroid Panel With TSH; Future  -     ECG 12 Lead  -     XR Chest 2 View  -     Ambulatory Referral to Cardiology    3. SOB (shortness of breath)  -     ECG 12 Lead  -     XR Chest 2 View    Other orders  -     carvedilol (Coreg) 3.125 MG tablet; Take 1 tablet by mouth 2 (Two) Times a Day With Meals.  Dispense: 60 tablet; Refill: 3             Follow Up   Return in about 2 weeks (around 9/15/2022).  Patient was given instructions and counseling regarding her condition or for health maintenance advice. Please see specific information pulled into the AVS if appropriate.

## 2022-09-08 ENCOUNTER — APPOINTMENT (OUTPATIENT)
Dept: GENERAL RADIOLOGY | Facility: HOSPITAL | Age: 37
End: 2022-09-08

## 2022-09-08 ENCOUNTER — APPOINTMENT (OUTPATIENT)
Dept: CT IMAGING | Facility: HOSPITAL | Age: 37
End: 2022-09-08

## 2022-09-08 ENCOUNTER — HOSPITAL ENCOUNTER (OUTPATIENT)
Facility: HOSPITAL | Age: 37
Discharge: HOME | End: 2022-09-12
Attending: EMERGENCY MEDICINE | Admitting: INTERNAL MEDICINE

## 2022-09-08 DIAGNOSIS — R55 NEAR SYNCOPE: ICD-10-CM

## 2022-09-08 DIAGNOSIS — R93.3 ABNORMAL CT SCAN, SIGMOID COLON: ICD-10-CM

## 2022-09-08 DIAGNOSIS — I26.93 SINGLE SUBSEGMENTAL PULMONARY EMBOLISM WITHOUT ACUTE COR PULMONALE: Primary | ICD-10-CM

## 2022-09-08 LAB
ALBUMIN SERPL-MCNC: 4.4 G/DL (ref 3.5–5.2)
ALBUMIN/GLOB SERPL: 1.5 G/DL
ALP SERPL-CCNC: 104 U/L (ref 39–117)
ALT SERPL W P-5'-P-CCNC: 102 U/L (ref 1–33)
ANION GAP SERPL CALCULATED.3IONS-SCNC: 8.7 MMOL/L (ref 5–15)
AST SERPL-CCNC: 59 U/L (ref 1–32)
BASOPHILS # BLD AUTO: 0.02 10*3/MM3 (ref 0–0.2)
BASOPHILS NFR BLD AUTO: 0.2 % (ref 0–1.5)
BILIRUB SERPL-MCNC: 0.3 MG/DL (ref 0–1.2)
BUN SERPL-MCNC: 10 MG/DL (ref 6–20)
BUN/CREAT SERPL: 10.2 (ref 7–25)
CALCIUM SPEC-SCNC: 9.7 MG/DL (ref 8.6–10.5)
CHLORIDE SERPL-SCNC: 109 MMOL/L (ref 98–107)
CO2 SERPL-SCNC: 23.3 MMOL/L (ref 22–29)
CREAT SERPL-MCNC: 0.98 MG/DL (ref 0.57–1)
DEPRECATED RDW RBC AUTO: 46.3 FL (ref 37–54)
EGFRCR SERPLBLD CKD-EPI 2021: 75.5 ML/MIN/1.73
EOSINOPHIL # BLD AUTO: 0.17 10*3/MM3 (ref 0–0.4)
EOSINOPHIL NFR BLD AUTO: 1.9 % (ref 0.3–6.2)
ERYTHROCYTE [DISTWIDTH] IN BLOOD BY AUTOMATED COUNT: 13.6 % (ref 12.3–15.4)
GLOBULIN UR ELPH-MCNC: 2.9 GM/DL
GLUCOSE SERPL-MCNC: 68 MG/DL (ref 65–99)
HCG SERPL QL: NEGATIVE
HCT VFR BLD AUTO: 40.6 % (ref 34–46.6)
HGB BLD-MCNC: 13.1 G/DL (ref 12–15.9)
HOLD SPECIMEN: NORMAL
IMM GRANULOCYTES # BLD AUTO: 0.06 10*3/MM3 (ref 0–0.05)
IMM GRANULOCYTES NFR BLD AUTO: 0.7 % (ref 0–0.5)
INR PPP: 1.13 (ref 0.9–1.1)
LYMPHOCYTES # BLD AUTO: 1.73 10*3/MM3 (ref 0.7–3.1)
LYMPHOCYTES NFR BLD AUTO: 19.8 % (ref 19.6–45.3)
MAGNESIUM SERPL-MCNC: 2.2 MG/DL (ref 1.6–2.6)
MCH RBC QN AUTO: 29.6 PG (ref 26.6–33)
MCHC RBC AUTO-ENTMCNC: 32.3 G/DL (ref 31.5–35.7)
MCV RBC AUTO: 91.9 FL (ref 79–97)
MONOCYTES # BLD AUTO: 0.45 10*3/MM3 (ref 0.1–0.9)
MONOCYTES NFR BLD AUTO: 5.2 % (ref 5–12)
NEUTROPHILS NFR BLD AUTO: 6.3 10*3/MM3 (ref 1.7–7)
NEUTROPHILS NFR BLD AUTO: 72.2 % (ref 42.7–76)
NRBC BLD AUTO-RTO: 0 /100 WBC (ref 0–0.2)
PLATELET # BLD AUTO: 365 10*3/MM3 (ref 140–450)
PMV BLD AUTO: 9.3 FL (ref 6–12)
POTASSIUM SERPL-SCNC: 3.9 MMOL/L (ref 3.5–5.2)
PROT SERPL-MCNC: 7.3 G/DL (ref 6–8.5)
PROTHROMBIN TIME: 14.3 SECONDS (ref 11.7–14.2)
RBC # BLD AUTO: 4.42 10*6/MM3 (ref 3.77–5.28)
SODIUM SERPL-SCNC: 141 MMOL/L (ref 136–145)
TROPONIN T SERPL-MCNC: <0.01 NG/ML (ref 0–0.03)
WBC NRBC COR # BLD: 8.73 10*3/MM3 (ref 3.4–10.8)
WHOLE BLOOD HOLD COAG: NORMAL
WHOLE BLOOD HOLD SPECIMEN: NORMAL

## 2022-09-08 PROCEDURE — G0378 HOSPITAL OBSERVATION PER HR: HCPCS

## 2022-09-08 PROCEDURE — 99284 EMERGENCY DEPT VISIT MOD MDM: CPT

## 2022-09-08 PROCEDURE — 84484 ASSAY OF TROPONIN QUANT: CPT | Performed by: EMERGENCY MEDICINE

## 2022-09-08 PROCEDURE — 96361 HYDRATE IV INFUSION ADD-ON: CPT

## 2022-09-08 PROCEDURE — 85610 PROTHROMBIN TIME: CPT | Performed by: EMERGENCY MEDICINE

## 2022-09-08 PROCEDURE — 71046 X-RAY EXAM CHEST 2 VIEWS: CPT

## 2022-09-08 PROCEDURE — 85025 COMPLETE CBC W/AUTO DIFF WBC: CPT | Performed by: EMERGENCY MEDICINE

## 2022-09-08 PROCEDURE — 84703 CHORIONIC GONADOTROPIN ASSAY: CPT | Performed by: EMERGENCY MEDICINE

## 2022-09-08 PROCEDURE — 0 IOPAMIDOL PER 1 ML: Performed by: EMERGENCY MEDICINE

## 2022-09-08 PROCEDURE — 83880 ASSAY OF NATRIURETIC PEPTIDE: CPT | Performed by: EMERGENCY MEDICINE

## 2022-09-08 PROCEDURE — 93005 ELECTROCARDIOGRAM TRACING: CPT | Performed by: EMERGENCY MEDICINE

## 2022-09-08 PROCEDURE — 71275 CT ANGIOGRAPHY CHEST: CPT

## 2022-09-08 PROCEDURE — 80053 COMPREHEN METABOLIC PANEL: CPT | Performed by: EMERGENCY MEDICINE

## 2022-09-08 PROCEDURE — 25010000002 ONDANSETRON PER 1 MG: Performed by: EMERGENCY MEDICINE

## 2022-09-08 PROCEDURE — 93010 ELECTROCARDIOGRAM REPORT: CPT | Performed by: INTERNAL MEDICINE

## 2022-09-08 PROCEDURE — 83735 ASSAY OF MAGNESIUM: CPT | Performed by: EMERGENCY MEDICINE

## 2022-09-08 PROCEDURE — 96374 THER/PROPH/DIAG INJ IV PUSH: CPT

## 2022-09-08 PROCEDURE — 93005 ELECTROCARDIOGRAM TRACING: CPT

## 2022-09-08 RX ORDER — ASPIRIN 325 MG
325 TABLET ORAL ONCE
Status: COMPLETED | OUTPATIENT
Start: 2022-09-08 | End: 2022-09-08

## 2022-09-08 RX ORDER — SODIUM CHLORIDE 0.9 % (FLUSH) 0.9 %
10 SYRINGE (ML) INJECTION AS NEEDED
Status: DISCONTINUED | OUTPATIENT
Start: 2022-09-08 | End: 2022-09-12 | Stop reason: HOSPADM

## 2022-09-08 RX ORDER — ONDANSETRON 2 MG/ML
4 INJECTION INTRAMUSCULAR; INTRAVENOUS ONCE
Status: COMPLETED | OUTPATIENT
Start: 2022-09-08 | End: 2022-09-08

## 2022-09-08 RX ORDER — SODIUM CHLORIDE 9 MG/ML
125 INJECTION, SOLUTION INTRAVENOUS CONTINUOUS
Status: DISCONTINUED | OUTPATIENT
Start: 2022-09-08 | End: 2022-09-12 | Stop reason: HOSPADM

## 2022-09-08 RX ADMIN — IOPAMIDOL 95 ML: 755 INJECTION, SOLUTION INTRAVENOUS at 22:36

## 2022-09-08 RX ADMIN — SODIUM CHLORIDE 500 ML: 9 INJECTION, SOLUTION INTRAVENOUS at 21:25

## 2022-09-08 RX ADMIN — APIXABAN 10 MG: 5 TABLET, FILM COATED ORAL at 23:35

## 2022-09-08 RX ADMIN — ASPIRIN 325 MG: 325 TABLET ORAL at 21:34

## 2022-09-08 RX ADMIN — IOPAMIDOL 95 ML: 755 INJECTION, SOLUTION INTRAVENOUS at 22:37

## 2022-09-08 RX ADMIN — SODIUM CHLORIDE 125 ML/HR: 9 INJECTION, SOLUTION INTRAVENOUS at 22:50

## 2022-09-08 RX ADMIN — SODIUM CHLORIDE 125 ML/HR: 9 INJECTION, SOLUTION INTRAVENOUS at 23:36

## 2022-09-08 RX ADMIN — ONDANSETRON 4 MG: 2 INJECTION INTRAMUSCULAR; INTRAVENOUS at 21:49

## 2022-09-09 LAB
ANION GAP SERPL CALCULATED.3IONS-SCNC: 6.4 MMOL/L (ref 5–15)
BASOPHILS # BLD AUTO: 0.03 10*3/MM3 (ref 0–0.2)
BASOPHILS NFR BLD AUTO: 0.4 % (ref 0–1.5)
BUN SERPL-MCNC: 8 MG/DL (ref 6–20)
BUN/CREAT SERPL: 10.8 (ref 7–25)
CALCIUM SPEC-SCNC: 9 MG/DL (ref 8.6–10.5)
CHLORIDE SERPL-SCNC: 110 MMOL/L (ref 98–107)
CO2 SERPL-SCNC: 20.6 MMOL/L (ref 22–29)
CREAT SERPL-MCNC: 0.74 MG/DL (ref 0.57–1)
DEPRECATED RDW RBC AUTO: 42.3 FL (ref 37–54)
EGFRCR SERPLBLD CKD-EPI 2021: 105.7 ML/MIN/1.73
EOSINOPHIL # BLD AUTO: 0.11 10*3/MM3 (ref 0–0.4)
EOSINOPHIL NFR BLD AUTO: 1.6 % (ref 0.3–6.2)
ERYTHROCYTE [DISTWIDTH] IN BLOOD BY AUTOMATED COUNT: 13.4 % (ref 12.3–15.4)
GLUCOSE SERPL-MCNC: 83 MG/DL (ref 65–99)
HCT VFR BLD AUTO: 34.9 % (ref 34–46.6)
HGB BLD-MCNC: 12.2 G/DL (ref 12–15.9)
IMM GRANULOCYTES # BLD AUTO: 0.04 10*3/MM3 (ref 0–0.05)
IMM GRANULOCYTES NFR BLD AUTO: 0.6 % (ref 0–0.5)
LYMPHOCYTES # BLD AUTO: 1.54 10*3/MM3 (ref 0.7–3.1)
LYMPHOCYTES NFR BLD AUTO: 22.5 % (ref 19.6–45.3)
MCH RBC QN AUTO: 30.5 PG (ref 26.6–33)
MCHC RBC AUTO-ENTMCNC: 35 G/DL (ref 31.5–35.7)
MCV RBC AUTO: 87.3 FL (ref 79–97)
MONOCYTES # BLD AUTO: 0.45 10*3/MM3 (ref 0.1–0.9)
MONOCYTES NFR BLD AUTO: 6.6 % (ref 5–12)
NEUTROPHILS NFR BLD AUTO: 4.68 10*3/MM3 (ref 1.7–7)
NEUTROPHILS NFR BLD AUTO: 68.3 % (ref 42.7–76)
NRBC BLD AUTO-RTO: 0 /100 WBC (ref 0–0.2)
NT-PROBNP SERPL-MCNC: 35.8 PG/ML (ref 0–450)
PLATELET # BLD AUTO: 358 10*3/MM3 (ref 140–450)
PMV BLD AUTO: 9.6 FL (ref 6–12)
POTASSIUM SERPL-SCNC: 3.7 MMOL/L (ref 3.5–5.2)
QT INTERVAL: 259 MS
RBC # BLD AUTO: 4 10*6/MM3 (ref 3.77–5.28)
SODIUM SERPL-SCNC: 137 MMOL/L (ref 136–145)
WBC NRBC COR # BLD: 6.85 10*3/MM3 (ref 3.4–10.8)

## 2022-09-09 PROCEDURE — G0378 HOSPITAL OBSERVATION PER HR: HCPCS

## 2022-09-09 PROCEDURE — 96361 HYDRATE IV INFUSION ADD-ON: CPT

## 2022-09-09 PROCEDURE — 85025 COMPLETE CBC W/AUTO DIFF WBC: CPT | Performed by: NURSE PRACTITIONER

## 2022-09-09 PROCEDURE — 63710000001 PROMETHAZINE PER 25 MG: Performed by: NURSE PRACTITIONER

## 2022-09-09 PROCEDURE — 80048 BASIC METABOLIC PNL TOTAL CA: CPT | Performed by: NURSE PRACTITIONER

## 2022-09-09 RX ORDER — ACETAMINOPHEN 325 MG/1
650 TABLET ORAL EVERY 4 HOURS PRN
Status: DISCONTINUED | OUTPATIENT
Start: 2022-09-09 | End: 2022-09-12 | Stop reason: HOSPADM

## 2022-09-09 RX ORDER — ALUMINA, MAGNESIA, AND SIMETHICONE 2400; 2400; 240 MG/30ML; MG/30ML; MG/30ML
15 SUSPENSION ORAL EVERY 6 HOURS PRN
Status: DISPENSED | OUTPATIENT
Start: 2022-09-09 | End: 2022-09-12

## 2022-09-09 RX ORDER — ACETAMINOPHEN 650 MG/1
650 SUPPOSITORY RECTAL EVERY 4 HOURS PRN
Status: DISCONTINUED | OUTPATIENT
Start: 2022-09-09 | End: 2022-09-12 | Stop reason: HOSPADM

## 2022-09-09 RX ORDER — BUSPIRONE HYDROCHLORIDE 10 MG/1
10 TABLET ORAL 2 TIMES DAILY
Status: DISCONTINUED | OUTPATIENT
Start: 2022-09-09 | End: 2022-09-12 | Stop reason: HOSPADM

## 2022-09-09 RX ORDER — ONDANSETRON 2 MG/ML
4 INJECTION INTRAMUSCULAR; INTRAVENOUS EVERY 6 HOURS PRN
Status: DISCONTINUED | OUTPATIENT
Start: 2022-09-09 | End: 2022-09-12 | Stop reason: HOSPADM

## 2022-09-09 RX ORDER — TIZANIDINE 4 MG/1
4 TABLET ORAL EVERY 8 HOURS PRN
Status: DISCONTINUED | OUTPATIENT
Start: 2022-09-09 | End: 2022-09-12 | Stop reason: HOSPADM

## 2022-09-09 RX ORDER — NITROGLYCERIN 0.4 MG/1
0.4 TABLET SUBLINGUAL
Status: DISCONTINUED | OUTPATIENT
Start: 2022-09-09 | End: 2022-09-12 | Stop reason: HOSPADM

## 2022-09-09 RX ORDER — SODIUM CHLORIDE 0.9 % (FLUSH) 0.9 %
10 SYRINGE (ML) INJECTION EVERY 12 HOURS SCHEDULED
Status: DISCONTINUED | OUTPATIENT
Start: 2022-09-09 | End: 2022-09-12

## 2022-09-09 RX ORDER — TOPIRAMATE 100 MG/1
100 TABLET, FILM COATED ORAL NIGHTLY
Status: DISCONTINUED | OUTPATIENT
Start: 2022-09-09 | End: 2022-09-12 | Stop reason: HOSPADM

## 2022-09-09 RX ORDER — PANTOPRAZOLE SODIUM 40 MG/1
40 TABLET, DELAYED RELEASE ORAL DAILY
Status: DISCONTINUED | OUTPATIENT
Start: 2022-09-09 | End: 2022-09-12 | Stop reason: HOSPADM

## 2022-09-09 RX ORDER — OXYCODONE HYDROCHLORIDE 5 MG/1
5 TABLET ORAL EVERY 4 HOURS PRN
Status: DISCONTINUED | OUTPATIENT
Start: 2022-09-09 | End: 2022-09-12 | Stop reason: HOSPADM

## 2022-09-09 RX ORDER — CARVEDILOL 3.12 MG/1
3.12 TABLET ORAL 2 TIMES DAILY WITH MEALS
Status: DISCONTINUED | OUTPATIENT
Start: 2022-09-09 | End: 2022-09-12 | Stop reason: HOSPADM

## 2022-09-09 RX ORDER — TOPIRAMATE 50 MG/1
50 TABLET, FILM COATED ORAL 2 TIMES DAILY
Status: DISCONTINUED | OUTPATIENT
Start: 2022-09-09 | End: 2022-09-09

## 2022-09-09 RX ORDER — HYDROXYZINE HYDROCHLORIDE 25 MG/1
25 TABLET, FILM COATED ORAL EVERY 8 HOURS PRN
Status: DISCONTINUED | OUTPATIENT
Start: 2022-09-09 | End: 2022-09-12 | Stop reason: HOSPADM

## 2022-09-09 RX ORDER — ACETAMINOPHEN 160 MG/5ML
650 SOLUTION ORAL EVERY 4 HOURS PRN
Status: DISCONTINUED | OUTPATIENT
Start: 2022-09-09 | End: 2022-09-12 | Stop reason: HOSPADM

## 2022-09-09 RX ORDER — PROMETHAZINE HYDROCHLORIDE 25 MG/1
25 TABLET ORAL EVERY 8 HOURS PRN
Status: DISCONTINUED | OUTPATIENT
Start: 2022-09-09 | End: 2022-09-12 | Stop reason: HOSPADM

## 2022-09-09 RX ORDER — TOPIRAMATE 50 MG/1
50 TABLET, FILM COATED ORAL DAILY
Status: DISCONTINUED | OUTPATIENT
Start: 2022-09-09 | End: 2022-09-12 | Stop reason: HOSPADM

## 2022-09-09 RX ORDER — SODIUM CHLORIDE 0.9 % (FLUSH) 0.9 %
10 SYRINGE (ML) INJECTION AS NEEDED
Status: DISCONTINUED | OUTPATIENT
Start: 2022-09-09 | End: 2022-09-12 | Stop reason: HOSPADM

## 2022-09-09 RX ORDER — ONDANSETRON 4 MG/1
4 TABLET, FILM COATED ORAL EVERY 8 HOURS PRN
Status: DISCONTINUED | OUTPATIENT
Start: 2022-09-09 | End: 2022-09-12 | Stop reason: HOSPADM

## 2022-09-09 RX ORDER — FLUTICASONE PROPIONATE 50 MCG
2 SPRAY, SUSPENSION (ML) NASAL DAILY
Status: DISCONTINUED | OUTPATIENT
Start: 2022-09-09 | End: 2022-09-12 | Stop reason: HOSPADM

## 2022-09-09 RX ORDER — ZOLPIDEM TARTRATE 5 MG/1
5 TABLET ORAL NIGHTLY PRN
Status: DISCONTINUED | OUTPATIENT
Start: 2022-09-09 | End: 2022-09-12 | Stop reason: HOSPADM

## 2022-09-09 RX ORDER — LIDOCAINE HYDROCHLORIDE 20 MG/ML
15 SOLUTION OROPHARYNGEAL EVERY 6 HOURS PRN
Status: DISPENSED | OUTPATIENT
Start: 2022-09-09 | End: 2022-09-12

## 2022-09-09 RX ADMIN — FLUTICASONE PROPIONATE 2 SPRAY: 50 SPRAY, METERED NASAL at 08:21

## 2022-09-09 RX ADMIN — ALUMINUM HYDROXIDE, MAGNESIUM HYDROXIDE, AND DIMETHICONE 15 ML: 400; 400; 40 SUSPENSION ORAL at 17:04

## 2022-09-09 RX ADMIN — AMITRIPTYLINE HYDROCHLORIDE 75 MG: 50 TABLET, FILM COATED ORAL at 21:30

## 2022-09-09 RX ADMIN — LIDOCAINE HYDROCHLORIDE 15 ML: 20 SOLUTION ORAL; TOPICAL at 17:04

## 2022-09-09 RX ADMIN — TOPIRAMATE 50 MG: 50 TABLET, FILM COATED ORAL at 08:37

## 2022-09-09 RX ADMIN — PANTOPRAZOLE SODIUM 40 MG: 40 TABLET, DELAYED RELEASE ORAL at 08:21

## 2022-09-09 RX ADMIN — BRIVARACETAM 100 MG: 25 TABLET, FILM COATED ORAL at 21:30

## 2022-09-09 RX ADMIN — TOPIRAMATE 100 MG: 100 TABLET, FILM COATED ORAL at 02:33

## 2022-09-09 RX ADMIN — APIXABAN 5 MG: 5 TABLET, FILM COATED ORAL at 08:21

## 2022-09-09 RX ADMIN — OXYCODONE 5 MG: 5 TABLET ORAL at 15:15

## 2022-09-09 RX ADMIN — Medication 10 ML: at 21:24

## 2022-09-09 RX ADMIN — ZOLPIDEM TARTRATE 5 MG: 5 TABLET, FILM COATED ORAL at 02:34

## 2022-09-09 RX ADMIN — BUSPIRONE HYDROCHLORIDE 10 MG: 10 TABLET ORAL at 21:30

## 2022-09-09 RX ADMIN — BUSPIRONE HYDROCHLORIDE 10 MG: 10 TABLET ORAL at 02:33

## 2022-09-09 RX ADMIN — BRIVARACETAM 100 MG: 25 TABLET, FILM COATED ORAL at 09:05

## 2022-09-09 RX ADMIN — CARVEDILOL 3.12 MG: 3.12 TABLET, FILM COATED ORAL at 17:01

## 2022-09-09 RX ADMIN — CARVEDILOL 3.12 MG: 3.12 TABLET, FILM COATED ORAL at 02:34

## 2022-09-09 RX ADMIN — BUSPIRONE HYDROCHLORIDE 10 MG: 10 TABLET ORAL at 08:21

## 2022-09-09 RX ADMIN — SODIUM CHLORIDE 500 ML: 9 INJECTION, SOLUTION INTRAVENOUS at 21:24

## 2022-09-09 RX ADMIN — CARVEDILOL 3.12 MG: 3.12 TABLET, FILM COATED ORAL at 08:21

## 2022-09-09 RX ADMIN — AMITRIPTYLINE HYDROCHLORIDE 75 MG: 50 TABLET, FILM COATED ORAL at 02:33

## 2022-09-09 RX ADMIN — PROMETHAZINE HYDROCHLORIDE 25 MG: 25 TABLET ORAL at 01:38

## 2022-09-09 RX ADMIN — APIXABAN 5 MG: 5 TABLET, FILM COATED ORAL at 21:30

## 2022-09-09 RX ADMIN — BRIVARACETAM 100 MG: 25 TABLET, FILM COATED ORAL at 01:38

## 2022-09-09 NOTE — H&P
Patient Name:  Liya Carlson  YOB: 1985  MRN:  [id number]  Admit Date:  9/8/2022  Patient Care Team:  Servando Gao MD as PCP - General (Internal Medicine)      Subjective   History Present Illness     Chief Complaint   Patient presents with   • Chest Pain       History of Present Illness   This is a 37-year-old female, seen in the hospital with main complaints of chest pain.  Patient upon further evaluation, has been diagnosed with a pulmonary embolism.  Patient was discharged from [facility], after full work-up.  Patient is already on anticoagulation.  She will need further work-up with cardiology and pulmonary evaluation.  Patient admitted to hospitalist service.  She had tachycardia and appears uncomfortable at the time of evaluation.    Review of Systems    Constitutional: Negative for activity change and appetite change.   HENT: Negative for congestion and dental problem.    Eyes: Negative for discharge and itching.   Respiratory: Negative for apnea and chest tightness.    Cardiovascular: Positive for chest pain.  Gastrointestinal: Negative for abdominal distention and abdominal pain.   Endocrine: Negative for cold intolerance and heat intolerance.   Genitourinary: Negative for difficulty urinating and frequency.   Musculoskeletal: Negative for arthralgias and back pain.   Skin: Negative for color change and pallor.   Allergic/Immunologic: Negative for environmental allergies and food allergies.   Neurological: Negative for dizziness and facial asymmetry.   Hematological: Negative for adenopathy. Does not bruise/bleed easily.   Psychiatric/Behavioral: Negative for agitation and suicidal ideas.       Personal History     Past Medical History:   Diagnosis Date   • Gastric ulcer    • Headache, tension-type    • Migraine    • Pancreatitis    • Seizures (HCC)      Past Surgical History:   Procedure Laterality Date   • LAPAROSCOPIC TUBAL LIGATION       Family History   Problem  Relation Age of Onset   • Diabetes Mother    • Hypertension Mother    • Migraines Mother    • Dementia Mother    • Arthritis Mother    • Kidney disease Mother    • Hypertension Father    • Diabetes Father    • Seizures Father    • Stroke Father      Social History     Tobacco Use   • Smoking status: Former Smoker   Substance Use Topics   • Alcohol use: Yes     Comment: OCCASIONAL   • Drug use: No     No current facility-administered medications on file prior to encounter.     Current Outpatient Medications on File Prior to Encounter   Medication Sig Dispense Refill   • amitriptyline (ELAVIL) 75 MG tablet Take 1 tablet by mouth Every Night.     • apixaban (ELIQUIS) 5 MG tablet tablet Take 5 mg by mouth 2 (Two) Times a Day.     • Brivaracetam (Briviact) 100 MG tablet Take 1 tablet by mouth 2 (Two) Times a Day.     • busPIRone (BUSPAR) 10 MG tablet Take 1 tablet by mouth 2 (Two) Times a Day. 60 tablet 3   • carvedilol (Coreg) 3.125 MG tablet Take 1 tablet by mouth 2 (Two) Times a Day With Meals. 60 tablet 3   • fluticasone (Flonase) 50 MCG/ACT nasal spray 2 sprays into the nostril(s) as directed by provider Daily for 30 days. Administer 2 sprays in each nostril for each dose. 18.2 mL 3   • hydrOXYzine (ATARAX) 25 MG tablet Take 1 tablet by mouth Every 8 (Eight) Hours As Needed for Anxiety. Do not drive 30 tablet 3   • ondansetron (ZOFRAN) 4 MG tablet Take 1 tablet by mouth Every 8 (Eight) Hours As Needed for Nausea or Vomiting. 30 tablet 3   • pantoprazole (PROTONIX) 40 MG EC tablet Take 1 tablet by mouth Daily. 30 tablet 3   • promethazine (PHENERGAN) 25 MG tablet Take 1 tablet by mouth Every 8 (Eight) Hours As Needed for Nausea or Vomiting. 20 tablet 0   • rizatriptan (Maxalt) 5 MG tablet Take 2 tablets by mouth 1 (One) Time As Needed for Migraine. May repeat in 2 hours if needed 12 tablet 0   • tiZANidine (ZANAFLEX) 4 MG tablet Take 1 tablet by mouth Every 8 (Eight) Hours As Needed for Muscle Spasms (Do not drive).  90 tablet 4   • topiramate (TOPAMAX) 50 MG tablet Take 1 tablet by mouth Every Night. (Patient taking differently: Take 50 mg by mouth 2 (Two) Times a Day. 50 MG in the Am and 100mg at night) 30 tablet 3   • zolpidem (AMBIEN) 10 MG tablet 1 p.o. at bedtime as needed sleep 30 tablet 3   • promethazine (PHENERGAN) 25 MG tablet Take 1 tablet by mouth every 8 (eight) hours as needed for nausea or vomiting. 30 tablet 3   • [DISCONTINUED] albuterol sulfate  (90 Base) MCG/ACT inhaler      • [DISCONTINUED] aspirin-acetaminophen-caffeine (EXCEDRIN MIGRAINE) 250-250-65 MG per tablet Take 1 tablet by mouth.     • [DISCONTINUED] cyclobenzaprine (FLEXERIL) 10 MG tablet Take 1 tablet by mouth 3 (Three) Times a Day As Needed for Muscle Spasms (do not drive). 30 tablet 3   • [DISCONTINUED] naproxen (Naprosyn) 500 MG tablet Take 1 tablet by mouth 2 (Two) Times a Day With Meals. 60 tablet 3     No Known Allergies    Objective    Objective     Vital Signs  Temp:  [97.6 °F (36.4 °C)-98 °F (36.7 °C)] 97.9 °F (36.6 °C)  Heart Rate:  [] 86  Resp:  [16-20] 16  BP: (108-150)/() 136/91  SpO2:  [99 %-100 %] 99 %  on   ;   Device (Oxygen Therapy): room air  Body mass index is 27.1 kg/m².    Physical Exam  General, awake and alert.  Head and ENT, normocephalic and atraumatic.  Lungs, symmetric expansion, equal air entry bilaterally.  Heart, regular rate and rhythm.  Abdomen, soft and nontender.  Extremities, no clubbing or cyanosis.  Neuro, no focal deficits.  Skin: Warm and no rash.  Psych, normal mood and affect.  Musculoskeletal, joint examination is grossly normal.    Results Review:  I reviewed the patient's new clinical results.  I reviewed the patient's new imaging results and agree with the interpretation.  I reviewed the patient's other test results and agree with the interpretation  I personally viewed and interpreted the patient's EKG/Telemetry data  Discussed with ED provider.    Lab Results (last 24 hours)      Procedure Component Value Units Date/Time    CBC & Differential [[id number]]  (Abnormal) Collected: 09/08/22 2122    Specimen: Blood Updated: 09/08/22 2142    Narrative:      The following orders were created for panel order CBC & Differential.  Procedure                               Abnormality         Status                     ---------                               -----------         ------                     CBC Auto Differential[[id number]]        Abnormal            Final result                 Please view results for these tests on the individual orders.    Comprehensive Metabolic Panel [[id number]]  (Abnormal) Collected: 09/08/22 2122    Specimen: Blood Updated: 09/08/22 2208     Glucose 68 mg/dL      BUN 10 mg/dL      Creatinine 0.98 mg/dL      Sodium 141 mmol/L      Potassium 3.9 mmol/L      Chloride 109 mmol/L      CO2 23.3 mmol/L      Calcium 9.7 mg/dL      Total Protein 7.3 g/dL      Albumin 4.40 g/dL      ALT (SGPT) 102 U/L      AST (SGOT) 59 U/L      Alkaline Phosphatase 104 U/L      Total Bilirubin 0.3 mg/dL      Globulin 2.9 gm/dL      A/G Ratio 1.5 g/dL      BUN/Creatinine Ratio 10.2     Anion Gap 8.7 mmol/L      eGFR 75.5 mL/min/1.73      Comment: National Kidney Foundation and American Society of Nephrology (ASN) Task Force recommended calculation based on the Chronic Kidney Disease Epidemiology Collaboration (CKD-EPI) equation refit without adjustment for race.       Narrative:      GFR Normal >60  Chronic Kidney Disease <60  Kidney Failure <15      Troponin [[id number]]  (Normal) Collected: 09/08/22 2122    Specimen: Blood Updated: 09/08/22 2208     Troponin T <0.010 ng/mL     Narrative:      Troponin T Reference Range:  <= 0.03 ng/mL-   Negative for AMI  >0.03 ng/mL-     Abnormal for myocardial necrosis.  Clinicians would have to utilize clinical acumen, EKG, Troponin and serial changes to determine if it is an Acute Myocardial Infarction or myocardial injury due to an underlying chronic  condition.       Results may be falsely decreased if patient taking Biotin.      CBC Auto Differential [[id number]]  (Abnormal) Collected: 09/08/22 2122    Specimen: Blood Updated: 09/08/22 2142     WBC 8.73 10*3/mm3      RBC 4.42 10*6/mm3      Hemoglobin 13.1 g/dL      Hematocrit 40.6 %      MCV 91.9 fL      MCH 29.6 pg      MCHC 32.3 g/dL      RDW 13.6 %      RDW-SD 46.3 fl      MPV 9.3 fL      Platelets 365 10*3/mm3      Neutrophil % 72.2 %      Lymphocyte % 19.8 %      Monocyte % 5.2 %      Eosinophil % 1.9 %      Basophil % 0.2 %      Immature Grans % 0.7 %      Neutrophils, Absolute 6.30 10*3/mm3      Lymphocytes, Absolute 1.73 10*3/mm3      Monocytes, Absolute 0.45 10*3/mm3      Eosinophils, Absolute 0.17 10*3/mm3      Basophils, Absolute 0.02 10*3/mm3      Immature Grans, Absolute 0.06 10*3/mm3      nRBC 0.0 /100 WBC     Protime-INR [[id number]]  (Abnormal) Collected: 09/08/22 2122    Specimen: Blood Updated: 09/08/22 2157     Protime 14.3 Seconds      INR 1.13    hCG, Serum, Qualitative [[id number]]  (Normal) Collected: 09/08/22 2122    Specimen: Blood Updated: 09/08/22 2220     HCG Qualitative Negative    Magnesium [[id number]]  (Normal) Collected: 09/08/22 2122    Specimen: Blood Updated: 09/08/22 2208     Magnesium 2.2 mg/dL     BNP [[id number]]  (Normal) Collected: 09/08/22 2122    Specimen: Blood Updated: 09/09/22 0140     proBNP 35.8 pg/mL     Narrative:      Among patients with dyspnea, NT-proBNP is highly sensitive for the detection of acute congestive heart failure. In addition NT-proBNP of <300 pg/ml effectively rules out acute congestive heart failure with 99% negative predictive value.    Results may be falsely decreased if patient taking Biotin.      Basic Metabolic Panel [[id number]]  (Abnormal) Collected: 09/09/22 0528    Specimen: Blood Updated: 09/09/22 0644     Glucose 83 mg/dL      BUN 8 mg/dL      Creatinine 0.74 mg/dL      Sodium 137 mmol/L      Potassium 3.7 mmol/L      Chloride 110 mmol/L       CO2 20.6 mmol/L      Calcium 9.0 mg/dL      BUN/Creatinine Ratio 10.8     Anion Gap 6.4 mmol/L      eGFR 105.7 mL/min/1.73      Comment: National Kidney Foundation and American Society of Nephrology (ASN) Task Force recommended calculation based on the Chronic Kidney Disease Epidemiology Collaboration (CKD-EPI) equation refit without adjustment for race.       Narrative:      GFR Normal >60  Chronic Kidney Disease <60  Kidney Failure <15      CBC Auto Differential [[id number]]  (Abnormal) Collected: 09/09/22 0528    Specimen: Blood Updated: 09/09/22 0628     WBC 6.85 10*3/mm3      RBC 4.00 10*6/mm3      Hemoglobin 12.2 g/dL      Hematocrit 34.9 %      MCV 87.3 fL      MCH 30.5 pg      MCHC 35.0 g/dL      RDW 13.4 %      RDW-SD 42.3 fl      MPV 9.6 fL      Platelets 358 10*3/mm3      Neutrophil % 68.3 %      Lymphocyte % 22.5 %      Monocyte % 6.6 %      Eosinophil % 1.6 %      Basophil % 0.4 %      Immature Grans % 0.6 %      Neutrophils, Absolute 4.68 10*3/mm3      Lymphocytes, Absolute 1.54 10*3/mm3      Monocytes, Absolute 0.45 10*3/mm3      Eosinophils, Absolute 0.11 10*3/mm3      Basophils, Absolute 0.03 10*3/mm3      Immature Grans, Absolute 0.04 10*3/mm3      nRBC 0.0 /100 WBC           Imaging Results (Last 24 Hours)     Procedure Component Value Units Date/Time    XR Chest 2 View [[id number]] Collected: 09/08/22 2104     Updated: 09/09/22 0626    Narrative:      CHEST 2 VIEWS     HISTORY: Chest pain.     PA and lateral views of the chest demonstrate the heart to be within  normal limits in size. There is no evidence of infiltrate, effusion or  of congestive failure.     This report was finalized on 9/9/2022 6:22 AM by Dr. Tang Henry M.D.       CT Angiogram Chest [[id number]] Collected: 09/08/22 2303     Updated: 09/08/22 2318    Narrative:      CT ANGIOGRAM OF THE CHEST     HISTORY: Shortness of breath     COMPARISON: None available.     TECHNIQUE: Axial CT imaging was obtained through the thorax. IV  contrast  was administered. Three-D reformatted images were obtained.     FINDINGS:  The patient reportedly had a pulmonary embolus in a branch of the right  lower lobe on prior CTA of the chest performed at an outside facility.  This examination is severely degraded by poor timing of the contrast  bolus, despite 2 studies. I think there is probably some thrombus within  a subsegmental pulmonary arterial branch to the right lower lobe, best  seen on image 92 of the coronal series and 242 of the second axial  series. No definite new pulmonary emboli are seen, although again, this  is a poor quality study. There is no evidence of right heart strain.  There is a common origin of the brachiocephalic artery and left common  carotid artery. There is no evidence of dissection. No coronary artery  calcifications are seen. Thoracic aorta is normal in caliber. Right lobe  of the thyroid gland appears somewhat hypoplastic. Trachea is within  normal limits. There is a small hiatal hernia. Mediastinal lymph nodes  do not appear pathologically enlarged. There is no pleural or  pericardial effusion. There is no evidence of pulmonary infarction. No  acute infiltrates are seen. Images through the upper abdomen do not  demonstrate any acute abnormalities. No acute osseous abnormalities are  seen.       Impression:         1. Suspected thrombus identified within a subsegmental branch to the  right lower lobe. Full assessment is significantly degraded by poor  technique. No definite additional emboli are seen. There is no evidence  of right heart strain or pulmonary infarction.     Radiation dose reduction techniques were utilized, including automated  exposure control and exposure modulation based on body size.     This report was finalized on 9/8/2022 11:14 PM by Dr. Rhina Austin M.D.                 ECG 12 Lead   Final Result   HEART RATE= 106  bpm   RR Interval= 566  ms   CT Interval= 157  ms   P Horizontal Axis= 5  deg   P  Front Axis= 41  deg   QRSD Interval= 84  ms   QT Interval= 259  ms   QRS Axis= -3  deg   T Wave Axis= 111  deg   - BORDERLINE ECG -   Sinus tachycardia   Borderline repolarization abnormality   No Prior Tracing for Comparison   Electronically Signed By: Idania Carmona ([facility]) 09-Sep-2022 07:57:17   Date and Time of Study: 2022-09-08 20:17:24      SCANNED - TELEMETRY     Final Result              Assessment/Plan     Active Hospital Problems    Diagnosis  POA   • Single subsegmental pulmonary embolism without acute cor pulmonale (HCC) [I26.93]  Yes   • Dyslipidemia [E78.5]  Yes   • Seizures (HCC) [R56.9]  Yes      Resolved Hospital Problems   No resolved problems to display.     Assessment and plan:  1.  Pulmonary embolism, continue current anticoagulation treatment plan.  Consult cardiology and pulmonary.    2.  History of migraine, symptomatic management as needed.  Continue Topamax.    3.  Depression, continue BuSpar and amitriptyline.    4.  CODE STATUS is full code.  Further plans based on hospital course.       Reginaldo Liang MD  [facility]  09/09/22  15:18 EDT

## 2022-09-09 NOTE — PLAN OF CARE
Problem: Adult Inpatient Plan of Care  Goal: Plan of Care Review  Outcome: Ongoing, Progressing  Goal: Patient-Specific Goal (Individualized)  Outcome: Ongoing, Progressing  Goal: Absence of Hospital-Acquired Illness or Injury  Outcome: Ongoing, Progressing  Goal: Optimal Comfort and Wellbeing  Outcome: Ongoing, Progressing  Goal: Readiness for Transition of Care  Outcome: Ongoing, Progressing  Intervention: Mutually Develop Transition Plan  Recent Flowsheet Documentation  Taken 9/9/2022 0044 by Amisha Grossman, RN  Transportation Anticipated: family or friend will provide  Patient/Family Anticipated Services at Transition: none  Patient/Family Anticipates Transition to: home with family  Taken 9/9/2022 0043 by Amisha Grossman, RN  Equipment Currently Used at Home: none   Goal Outcome Evaluation:

## 2022-09-09 NOTE — ED NOTES
Patient presents to ED from home with report of chest pain that started this morning. Patient was discharged from [facility] this morning after being diagnosed with PE on 9/5/22.     Patient placed in mask upon arrival, triage staff wearing appropriate PPE.

## 2022-09-09 NOTE — ED NOTES
Nursing report ED to floor  Liya Carlson  37 y.o.  female    HPI :   Chief Complaint   Patient presents with   • Chest Pain       Admitting doctor:   Cristian Henson MD    Admitting diagnosis:   The primary encounter diagnosis was Single subsegmental pulmonary embolism without acute cor pulmonale (HCC). A diagnosis of Near syncope was also pertinent to this visit.    Code status:   Current Code Status     Date Active Code Status Order ID Comments User Context       Not on file    Advance Care Planning Activity          Allergies:   Patient has no known allergies.    Intake and Output  No intake or output data in the 24 hours ending 09/08/22 2347    Weight:       09/08/22 2339   Weight: 78.5 kg (173 lb)       Most recent vitals:   Vitals:    09/08/22 2122 09/08/22 2146 09/08/22 2246 09/08/22 2339   BP:  (!) 150/102 137/78 137/85   BP Location:    Right arm   Patient Position:    Sitting   Pulse: 101 94 90 80   Resp:    16   Temp:       SpO2: 100% 100% 100% 100%   Weight:    78.5 kg (173 lb)   Height:           Active LDAs/IV Access:   Lines, Drains & Airways     Active LDAs     Name Placement date Placement time Site Days    Peripheral IV 09/08/22 2123 Right Antecubital 09/08/22 2123  Antecubital  less than 1                Labs (abnormal labs have a star):   Labs Reviewed   COMPREHENSIVE METABOLIC PANEL - Abnormal; Notable for the following components:       Result Value    Chloride 109 (*)     ALT (SGPT) 102 (*)     AST (SGOT) 59 (*)     All other components within normal limits    Narrative:     GFR Normal >60  Chronic Kidney Disease <60  Kidney Failure <15     CBC WITH AUTO DIFFERENTIAL - Abnormal; Notable for the following components:    Immature Grans % 0.7 (*)     Immature Grans, Absolute 0.06 (*)     All other components within normal limits   PROTIME-INR - Abnormal; Notable for the following components:    Protime 14.3 (*)     INR 1.13 (*)     All other components within normal limits   TROPONIN  (IN-HOUSE) - Normal    Narrative:     Troponin T Reference Range:  <= 0.03 ng/mL-   Negative for AMI  >0.03 ng/mL-     Abnormal for myocardial necrosis.  Clinicians would have to utilize clinical acumen, EKG, Troponin and serial changes to determine if it is an Acute Myocardial Infarction or myocardial injury due to an underlying chronic condition.       Results may be falsely decreased if patient taking Biotin.     HCG, SERUM, QUALITATIVE - Normal   MAGNESIUM - Normal   RAINBOW DRAW    Narrative:     The following orders were created for panel order [place] Draw.  Procedure                               Abnormality         Status                     ---------                               -----------         ------                     Green Top (Gel)[[id number]]                                  Final result               Lavender Top[[id number]]                                     Final result               Gold Top - SST[[id number]]                                                              Light Blue Top[[id number]]                                   Final result                 Please view results for these tests on the individual orders.   BNP (IN-HOUSE)   CBC AND DIFFERENTIAL    Narrative:     The following orders were created for panel order CBC & Differential.  Procedure                               Abnormality         Status                     ---------                               -----------         ------                     CBC Auto Differential[[id number]]        Abnormal            Final result                 Please view results for these tests on the individual orders.   GREEN TOP   LAVENDER TOP   LIGHT BLUE TOP       EKG:   ECG 12 Lead   Preliminary Result   HEART RATE= 106  bpm   RR Interval= 566  ms   WA Interval= 157  ms   P Horizontal Axis= 5  deg   P Front Axis= 41  deg   QRSD Interval= 84  ms   QT Interval= 259  ms   QRS Axis= -3  deg   T Wave Axis= 111  deg   - BORDERLINE ECG -   Sinus  tachycardia   Borderline repolarization abnormality   Electronically Signed By:    Date and Time of Study: 2022-09-08 20:17:24          Meds given in ED:   Medications   sodium chloride 0.9 % flush 10 mL (has no administration in time range)   sodium chloride 0.9 % flush 10 mL (has no administration in time range)   sodium chloride 0.9 % infusion (125 mL/hr Intravenous New Bag 9/8/22 2336)   aspirin tablet 325 mg (325 mg Oral Given 9/8/22 2134)   sodium chloride 0.9 % bolus 500 mL (0 mL Intravenous Stopped 9/8/22 2324)   ondansetron (ZOFRAN) injection 4 mg (4 mg Intravenous Given 9/8/22 2149)   iopamidol (ISOVUE-370) 76 % injection 95 mL (95 mL Intravenous Given 9/8/22 2236)   iopamidol (ISOVUE-370) 76 % injection 95 mL (95 mL Intravenous Given 9/8/22 2237)   apixaban (ELIQUIS) tablet 10 mg (10 mg Oral Given 9/8/22 2335)       Imaging results:  CT Angiogram Chest    Result Date: 9/8/2022   1. Suspected thrombus identified within a subsegmental branch to the right lower lobe. Full assessment is significantly degraded by poor technique. No definite additional emboli are seen. There is no evidence of right heart strain or pulmonary infarction.  Radiation dose reduction techniques were utilized, including automated exposure control and exposure modulation based on body size.  This report was finalized on 9/8/2022 11:14 PM by Dr. Rhina Austin M.D.        Ambulatory status:   - ambulatory     Social issues:   Social History     Socioeconomic History   • Marital status:    Tobacco Use   • Smoking status: Former Smoker   Substance and Sexual Activity   • Alcohol use: Yes     Comment: OCCASIONAL   • Drug use: No       NIH Stroke Scale:        Nursing report ED to floor:

## 2022-09-09 NOTE — PLAN OF CARE
Problem: Pain Acute  Goal: Acceptable Pain Control and Functional Ability  Outcome: Ongoing, Progressing   Goal Outcome Evaluation:  Plan of Care Reviewed With: patient        Progress: improving  Outcome Evaluation: VSS. No c/o pain. Will monitor.

## 2022-09-09 NOTE — CONSULTS
Group: [facility]         CONSULT NOTE    Patient Identification:  Liya Carlson  37 y.o.  female  1985  [id number]            Requesting physician: Dr Liang    Reason for Consultation:  PE, chest pain    CC: chest pain    History of Present Illness:  37-year-old -American female who presents with chest pain.  When I asked her to describe it in more detail she actually points to her epigastrium.  She says it has been present for about 4 days or so.  She is not exact on the timing.  She says it is exacerbated by eating food.  When I asked her to take a deep breath she says it does not affect it much.  She denies any hemoptysis or cough.  She complains of generalized fatigue.  She is taking Eliquis.  She was recently discharged from [facility] days ago.  She was diagnosed there with a subsegmental right-sided pulmonary embolism.  She was transitioned to Eliquis from Lovenox and discharged home after a few days.  There she had echocardiogram which was unremarkable.  I reviewed her medical records.  I reviewed H&P by Dr. Marcellus Sandoval from the emergency room.  She denies any family history in her siblings or parents of any blood clots.  Lifelong non-smoker    Review of Systems   Constitutional: Negative for diaphoresis and fever.   HENT: Negative for ear discharge and sore throat.    Eyes: Negative for pain and visual disturbance.   Respiratory: Negative for cough and shortness of breath.    Cardiovascular: Positive for chest pain. Negative for leg swelling.   Gastrointestinal: Positive for abdominal pain. Negative for diarrhea.   Endocrine: Negative for cold intolerance and polyuria.   Genitourinary: Negative for dysuria and hematuria.   Musculoskeletal: Negative for joint swelling and myalgias.   Skin: Negative for rash and wound.   Neurological: Negative for speech difficulty and numbness.   Hematological: Negative for adenopathy. Does not bruise/bleed easily.    Psychiatric/Behavioral: Negative for agitation and confusion.       Past Medical History:  Past Medical History:   Diagnosis Date   • Gastric ulcer    • Headache, tension-type    • Migraine    • Pancreatitis    • Seizures (HCC)        Past Surgical History:  Past Surgical History:   Procedure Laterality Date   • LAPAROSCOPIC TUBAL LIGATION          Home Meds:  Medications Prior to Admission   Medication Sig Dispense Refill Last Dose   • amitriptyline (ELAVIL) 75 MG tablet Take 1 tablet by mouth Every Night.      • apixaban (ELIQUIS) 5 MG tablet tablet Take 5 mg by mouth 2 (Two) Times a Day.      • Brivaracetam (Briviact) 100 MG tablet Take 1 tablet by mouth 2 (Two) Times a Day.      • busPIRone (BUSPAR) 10 MG tablet Take 1 tablet by mouth 2 (Two) Times a Day. 60 tablet 3    • carvedilol (Coreg) 3.125 MG tablet Take 1 tablet by mouth 2 (Two) Times a Day With Meals. 60 tablet 3    • fluticasone (Flonase) 50 MCG/ACT nasal spray 2 sprays into the nostril(s) as directed by provider Daily for 30 days. Administer 2 sprays in each nostril for each dose. 18.2 mL 3    • hydrOXYzine (ATARAX) 25 MG tablet Take 1 tablet by mouth Every 8 (Eight) Hours As Needed for Anxiety. Do not drive 30 tablet 3    • ondansetron (ZOFRAN) 4 MG tablet Take 1 tablet by mouth Every 8 (Eight) Hours As Needed for Nausea or Vomiting. 30 tablet 3    • pantoprazole (PROTONIX) 40 MG EC tablet Take 1 tablet by mouth Daily. 30 tablet 3    • promethazine (PHENERGAN) 25 MG tablet Take 1 tablet by mouth Every 8 (Eight) Hours As Needed for Nausea or Vomiting. 20 tablet 0    • rizatriptan (Maxalt) 5 MG tablet Take 2 tablets by mouth 1 (One) Time As Needed for Migraine. May repeat in 2 hours if needed 12 tablet 0    • tiZANidine (ZANAFLEX) 4 MG tablet Take 1 tablet by mouth Every 8 (Eight) Hours As Needed for Muscle Spasms (Do not drive). 90 tablet 4    • topiramate (TOPAMAX) 50 MG tablet Take 1 tablet by mouth Every Night. (Patient taking differently: Take  "50 mg by mouth 2 (Two) Times a Day. 50 MG in the Am and 100mg at night) 30 tablet 3    • zolpidem (AMBIEN) 10 MG tablet 1 p.o. at bedtime as needed sleep 30 tablet 3    • promethazine (PHENERGAN) 25 MG tablet Take 1 tablet by mouth every 8 (eight) hours as needed for nausea or vomiting. 30 tablet 3        Allergies:  No Known Allergies    Social History:   Social History     Socioeconomic History   • Marital status:    Tobacco Use   • Smoking status: Former Smoker   Substance and Sexual Activity   • Alcohol use: Yes     Comment: OCCASIONAL   • Drug use: No       Family History:  Family History   Problem Relation Age of Onset   • Diabetes Mother    • Hypertension Mother    • Migraines Mother    • Dementia Mother    • Arthritis Mother    • Kidney disease Mother    • Hypertension Father    • Diabetes Father    • Seizures Father    • Stroke Father        Physical Exam:  /91 (BP Location: Left arm, Patient Position: Lying)   Pulse 86   Temp 97.9 °F (36.6 °C) (Oral)   Resp 16   Ht 170.2 cm (67\")   Wt 78.5 kg (173 lb)   SpO2 99%   BMI 27.10 kg/m²  Body mass index is 27.1 kg/m². 99% 78.5 kg (173 lb)  Physical Exam  HENT:      Right Ear: External ear normal.      Left Ear: External ear normal.      Nose: Nose normal.   Eyes:      Conjunctiva/sclera: Conjunctivae normal.      Pupils: Pupils are equal, round, and reactive to light.   Neck:      Thyroid: No thyromegaly.      Vascular: No JVD.      Trachea: No tracheal deviation.   Cardiovascular:      Rate and Rhythm: Normal rate and regular rhythm.      Heart sounds: Normal heart sounds. No murmur heard.  Pulmonary:      Effort: Pulmonary effort is normal.      Breath sounds: Normal breath sounds.   Abdominal:      Palpations: Abdomen is soft.      Tenderness: There is no abdominal tenderness. There is no rebound.      Comments: Cannot palpate liver or spleen enlargement   Musculoskeletal:         General: No deformity. Normal range of motion.      Cervical " back: Neck supple. No rigidity.   Skin:     General: Skin is warm.      Findings: No rash.      Comments: No palpable nodules   Neurological:      General: No focal deficit present.      Mental Status: She is alert and oriented to person, place, and time.      Cranial Nerves: No cranial nerve deficit.      Motor: No weakness.   Psychiatric:         Mood and Affect: Mood normal.         Thought Content: Thought content normal.         LABS:  No results found for: PRINCESS    Lab Results   Component Value Date    CALCIUM 9.0 09/09/2022     Results from last 7 days   Lab Units 09/09/22  0528 09/08/22 2122 09/08/22 2122 09/07/22  0155 09/06/22  0027   MAGNESIUM mg/dL  --   --  2.2 2.0 2.1   SODIUM mmol/L 137  --  141  --   --    POTASSIUM mmol/L 3.7  --  3.9  --   --    CHLORIDE mmol/L 110*  --  109*  --   --    CO2 mmol/L 20.6*  --  23.3  --   --    BUN mg/dL 8  --  10  --   --    CREATININE mg/dL 0.74  --  0.98  --   --    GLUCOSE mg/dL 83   < > 68  --   --    CALCIUM mg/dL 9.0  --  9.7  --   --    WBC 10*3/mm3 6.85  --  8.73  --   --    HEMOGLOBIN g/dL 12.2  --  13.1  --   --    PLATELETS 10*3/mm3 358  --  365  --   --    ALT (SGPT) U/L  --   --  102*  --   --    AST (SGOT) U/L  --   --  59*  --   --    PROBNP pg/mL  --   --  35.8  --   --     < > = values in this interval not displayed.     Lab Results   Component Value Date    TROPONINI <0.010 09/04/2022    TROPONINT <0.010 09/08/2022     Results from last 7 days   Lab Units 09/08/22 2122 09/04/22 2232 09/04/22  1815   TROPONIN I ng/mL  --  <0.010 <0.010   TROPONIN T ng/mL <0.010  --   --                      Results from last 7 days   Lab Units 09/08/22 2122 09/05/22  0140   INR  1.13* 1.0         Lab Results   Component Value Date    TSH 2.390 09/01/2022     Estimated Creatinine Clearance: 110.2 mL/min (by C-G formula based on SCr of 0.74 mg/dL).         Imaging: I personally visualized the images of CT chest.  Degraded images.  I cannot establish whether she  has an embolism.  Radiologist report states subsegmental embolism to the right lower lobe branch..      Assessment:  Epigastric pain.  Elevated liver enzymes  Subsegmental right lower lobe pulmonary embolism  On anticoagulation      Recommendations:  Her pulmonary embolism is small, subsegmental.  This is of little clinical importance or any burden at this time.  She does not have hypoxemia or right heart overload as per recent records at [facility].  Radiologist having difficult time visualizing embolism but suspects right lower lobe subsegmental branch has a pulmonary embolism.  She will have to take 6 months of Eliquis.  The way she describes her pain it is lower chest, upper abdomen, epigastric in location and exacerbated by intake of food.  I will order a GI cocktail at this time.  If this provides relief, I recommend GI consultation.  She may need endoscopy at some point.  Not sure why her liver enzymes are elevated but it may be due to her antiepileptics that she takes for migraine.  Defer further work-up to GI.  Not much else to offer from the pulmonary standpoint.      Tam Buckley MD  9/9/2022  15:53 EDT      Much of this encounter note is an electronic transcription/translation of spoken language to printed text using Dragon Software.

## 2022-09-10 ENCOUNTER — APPOINTMENT (OUTPATIENT)
Dept: CT IMAGING | Facility: HOSPITAL | Age: 37
End: 2022-09-10

## 2022-09-10 LAB
ALBUMIN SERPL-MCNC: 3.6 G/DL (ref 3.5–5.2)
ALBUMIN/GLOB SERPL: 1.3 G/DL
ALP SERPL-CCNC: 86 U/L (ref 39–117)
ALT SERPL W P-5'-P-CCNC: 101 U/L (ref 1–33)
ANION GAP SERPL CALCULATED.3IONS-SCNC: 8.4 MMOL/L (ref 5–15)
AST SERPL-CCNC: 46 U/L (ref 1–32)
BASOPHILS # BLD AUTO: 0.02 10*3/MM3 (ref 0–0.2)
BASOPHILS NFR BLD AUTO: 0.4 % (ref 0–1.5)
BILIRUB SERPL-MCNC: <0.2 MG/DL (ref 0–1.2)
BUN SERPL-MCNC: 8 MG/DL (ref 6–20)
BUN/CREAT SERPL: 10.8 (ref 7–25)
CALCIUM SPEC-SCNC: 9 MG/DL (ref 8.6–10.5)
CHLORIDE SERPL-SCNC: 107 MMOL/L (ref 98–107)
CO2 SERPL-SCNC: 21.6 MMOL/L (ref 22–29)
CREAT SERPL-MCNC: 0.74 MG/DL (ref 0.57–1)
DEPRECATED RDW RBC AUTO: 41.1 FL (ref 37–54)
EGFRCR SERPLBLD CKD-EPI 2021: 105.7 ML/MIN/1.73
EOSINOPHIL # BLD AUTO: 0.11 10*3/MM3 (ref 0–0.4)
EOSINOPHIL NFR BLD AUTO: 2.1 % (ref 0.3–6.2)
ERYTHROCYTE [DISTWIDTH] IN BLOOD BY AUTOMATED COUNT: 12.9 % (ref 12.3–15.4)
GLOBULIN UR ELPH-MCNC: 2.8 GM/DL
GLUCOSE SERPL-MCNC: 93 MG/DL (ref 65–99)
HCT VFR BLD AUTO: 36.9 % (ref 34–46.6)
HGB BLD-MCNC: 12.6 G/DL (ref 12–15.9)
IMM GRANULOCYTES # BLD AUTO: 0.08 10*3/MM3 (ref 0–0.05)
IMM GRANULOCYTES NFR BLD AUTO: 1.5 % (ref 0–0.5)
LYMPHOCYTES # BLD AUTO: 1.74 10*3/MM3 (ref 0.7–3.1)
LYMPHOCYTES NFR BLD AUTO: 33 % (ref 19.6–45.3)
MCH RBC QN AUTO: 29.6 PG (ref 26.6–33)
MCHC RBC AUTO-ENTMCNC: 34.1 G/DL (ref 31.5–35.7)
MCV RBC AUTO: 86.8 FL (ref 79–97)
MONOCYTES # BLD AUTO: 0.31 10*3/MM3 (ref 0.1–0.9)
MONOCYTES NFR BLD AUTO: 5.9 % (ref 5–12)
NEUTROPHILS NFR BLD AUTO: 3.02 10*3/MM3 (ref 1.7–7)
NEUTROPHILS NFR BLD AUTO: 57.1 % (ref 42.7–76)
NRBC BLD AUTO-RTO: 0 /100 WBC (ref 0–0.2)
PLATELET # BLD AUTO: 333 10*3/MM3 (ref 140–450)
PMV BLD AUTO: 9.5 FL (ref 6–12)
POTASSIUM SERPL-SCNC: 3.8 MMOL/L (ref 3.5–5.2)
PROT SERPL-MCNC: 6.4 G/DL (ref 6–8.5)
RBC # BLD AUTO: 4.25 10*6/MM3 (ref 3.77–5.28)
SODIUM SERPL-SCNC: 137 MMOL/L (ref 136–145)
WBC NRBC COR # BLD: 5.28 10*3/MM3 (ref 3.4–10.8)

## 2022-09-10 PROCEDURE — 85025 COMPLETE CBC W/AUTO DIFF WBC: CPT | Performed by: INTERNAL MEDICINE

## 2022-09-10 PROCEDURE — 74176 CT ABD & PELVIS W/O CONTRAST: CPT

## 2022-09-10 PROCEDURE — G0378 HOSPITAL OBSERVATION PER HR: HCPCS

## 2022-09-10 PROCEDURE — 80053 COMPREHEN METABOLIC PANEL: CPT | Performed by: INTERNAL MEDICINE

## 2022-09-10 PROCEDURE — 99204 OFFICE O/P NEW MOD 45 MIN: CPT | Performed by: INTERNAL MEDICINE

## 2022-09-10 PROCEDURE — 63710000001 PROMETHAZINE PER 25 MG: Performed by: NURSE PRACTITIONER

## 2022-09-10 RX ADMIN — PROMETHAZINE HYDROCHLORIDE 25 MG: 25 TABLET ORAL at 16:33

## 2022-09-10 RX ADMIN — PANTOPRAZOLE SODIUM 40 MG: 40 TABLET, DELAYED RELEASE ORAL at 08:38

## 2022-09-10 RX ADMIN — APIXABAN 5 MG: 5 TABLET, FILM COATED ORAL at 20:40

## 2022-09-10 RX ADMIN — FLUTICASONE PROPIONATE 2 SPRAY: 50 SPRAY, METERED NASAL at 08:40

## 2022-09-10 RX ADMIN — BUSPIRONE HYDROCHLORIDE 10 MG: 10 TABLET ORAL at 08:38

## 2022-09-10 RX ADMIN — CARVEDILOL 3.12 MG: 3.12 TABLET, FILM COATED ORAL at 18:40

## 2022-09-10 RX ADMIN — TOPIRAMATE 100 MG: 100 TABLET, FILM COATED ORAL at 00:10

## 2022-09-10 RX ADMIN — TOPIRAMATE 50 MG: 50 TABLET, FILM COATED ORAL at 08:38

## 2022-09-10 RX ADMIN — ZOLPIDEM TARTRATE 5 MG: 5 TABLET, FILM COATED ORAL at 22:56

## 2022-09-10 RX ADMIN — OXYCODONE 5 MG: 5 TABLET ORAL at 00:15

## 2022-09-10 RX ADMIN — AMITRIPTYLINE HYDROCHLORIDE 75 MG: 50 TABLET, FILM COATED ORAL at 20:39

## 2022-09-10 RX ADMIN — CARVEDILOL 3.12 MG: 3.12 TABLET, FILM COATED ORAL at 08:38

## 2022-09-10 RX ADMIN — BRIVARACETAM 100 MG: 25 TABLET, FILM COATED ORAL at 20:39

## 2022-09-10 RX ADMIN — Medication 10 ML: at 20:40

## 2022-09-10 RX ADMIN — APIXABAN 5 MG: 5 TABLET, FILM COATED ORAL at 08:38

## 2022-09-10 RX ADMIN — OXYCODONE 5 MG: 5 TABLET ORAL at 17:03

## 2022-09-10 RX ADMIN — BUSPIRONE HYDROCHLORIDE 10 MG: 10 TABLET ORAL at 20:39

## 2022-09-10 RX ADMIN — OXYCODONE 5 MG: 5 TABLET ORAL at 20:44

## 2022-09-10 RX ADMIN — OXYCODONE 5 MG: 5 TABLET ORAL at 10:47

## 2022-09-10 RX ADMIN — TOPIRAMATE 100 MG: 100 TABLET, FILM COATED ORAL at 22:20

## 2022-09-10 RX ADMIN — BRIVARACETAM 100 MG: 25 TABLET, FILM COATED ORAL at 08:38

## 2022-09-10 NOTE — PROGRESS NOTES
Name: Liya Carlson ADMIT: 2022   : 1985  PCP: Servando Gao MD    MRN: [id number] LOS: 0 days   AGE/SEX: 37 y.o. female  ROOM: [facility]     Subjective   Subjective   Patient seen at bedside.       Objective   Objective   Vital Signs  Temp:  [97.6 °F (36.4 °C)-97.9 °F (36.6 °C)] 97.9 °F (36.6 °C)  Heart Rate:  [73-97] 97  Resp:  [17-18] 17  BP: ()/(60-99) 144/99  SpO2:  [97 %-100 %] 100 %  on   ;   Device (Oxygen Therapy): room air  Body mass index is 27.1 kg/m².  Physical Exam   General, awake and alert.  Head and ENT, normocephalic and atraumatic.  Lungs, symmetric expansion, equal air entry bilaterally.  Heart, regular rate and rhythm.  Abdomen, soft and nontender.  Extremities, no clubbing or cyanosis.  Neuro, no focal deficits.  Skin: Warm and no rash.  Psych, normal mood and affect.  Musculoskeletal, joint examination is grossly normal.      Results Review     I reviewed the patient's new clinical results.  Results from last 7 days   Lab Units 09/10/22  0646 22   WBC 10*3/mm3 5.28 6.85 8.73   HEMOGLOBIN g/dL 12.6 12.2 13.1   PLATELETS 10*3/mm3 333 358 365     Results from last 7 days   Lab Units 09/10/22  0646 22  0528 22   SODIUM mmol/L 137 137 141   POTASSIUM mmol/L 3.8 3.7 3.9   CHLORIDE mmol/L 107 110* 109*   CO2 mmol/L 21.6* 20.6* 23.3   BUN mg/dL 8 8 10   CREATININE mg/dL 0.74 0.74 0.98   GLUCOSE mg/dL 93 83 68   EGFR mL/min/1.73 105.7 105.7 75.5     Results from last 7 days   Lab Units 09/10/22  0646 22   ALBUMIN g/dL 3.60 4.40   BILIRUBIN mg/dL <0.2 0.3   ALK PHOS U/L 86 104   AST (SGOT) U/L 46* 59*   ALT (SGPT) U/L 101* 102*     Results from last 7 days   Lab Units 09/10/22  0646 22  0528 22  2122 22  0155 22  0027 22  0412   CALCIUM mg/dL 9.0 9.0 9.7  --   --   --    ALBUMIN g/dL 3.60  --  4.40  --   --   --    MAGNESIUM mg/dL  --   --  2.2 2.0 2.1 2.0       No results found for: HGBA1C,  POCGLU    CT Abdomen Pelvis Without Contrast    Result Date: 9/10/2022  Electronically signed by Alexis Noguera MD on 09-10-22 at 1833    CT Angiogram Chest    Result Date: 9/8/2022   1. Suspected thrombus identified within a subsegmental branch to the right lower lobe. Full assessment is significantly degraded by poor technique. No definite additional emboli are seen. There is no evidence of right heart strain or pulmonary infarction.  Radiation dose reduction techniques were utilized, including automated exposure control and exposure modulation based on body size.  This report was finalized on 9/8/2022 11:14 PM by Dr. Rhina Austin M.D.      Scheduled Medications  amitriptyline, 75 mg, Oral, Nightly  apixaban, 5 mg, Oral, BID  brivaracetam, 100 mg, Oral, BID  busPIRone, 10 mg, Oral, BID  carvedilol, 3.125 mg, Oral, BID With Meals  fluticasone, 2 spray, Nasal, Daily  pantoprazole, 40 mg, Oral, Daily  sodium chloride, 10 mL, Intravenous, Q12H  topiramate, 100 mg, Oral, Nightly   And  topiramate, 50 mg, Oral, Daily    Infusions  sodium chloride, 125 mL/hr, Last Rate: Stopped (09/09/22 1539)    Diet  Diet Regular       Assessment/Plan     Active Hospital Problems    Diagnosis  POA   • Single subsegmental pulmonary embolism without acute cor pulmonale (HCC) [I26.93]  Yes   • Dyslipidemia [E78.5]  Yes   • Seizures (HCC) [R56.9]  Yes      Resolved Hospital Problems   No resolved problems to display.           Assessment and plan:  1.  Pulmonary embolism, continue current anticoagulation treatment plan.    Reviewed cardiology and pulmonary recommendations.     2.  History of migraine, symptomatic management as needed.  Continue Topamax.     3.  Depression, continue BuSpar and amitriptyline.     4.  CODE STATUS is full code.  Further plans based on hospital course.  Hopefully discharge home tomorrow.         Reginaldo Liang MD  [facility]  09/10/22  19:10 EDT

## 2022-09-10 NOTE — PLAN OF CARE
Problem: Adult Inpatient Plan of Care  Goal: Plan of Care Review  Outcome: Ongoing, Progressing  Flowsheets (Taken 9/10/2022 0236)  Outcome Evaluation: Pt's BP low at the beginning of shift. Pt also has c/o of left abdominal pain radiating to her back. 500 cc bolus ordered and CT abd ordered. PO PRN pain meds given as needed. Up adlib.  Goal: Patient-Specific Goal (Individualized)  Outcome: Ongoing, Progressing  Goal: Absence of Hospital-Acquired Illness or Injury  Outcome: Ongoing, Progressing  Intervention: Identify and Manage Fall Risk  Recent Flowsheet Documentation  Taken 9/10/2022 0201 by Amisha Grossman, RN  Safety Promotion/Fall Prevention:   activity supervised   assistive device/personal items within reach   clutter free environment maintained   fall prevention program maintained   lighting adjusted   mobility aid in reach   nonskid shoes/slippers when out of bed   safety round/check completed   toileting scheduled  Taken 9/10/2022 0053 by Amisha Grossman, RN  Safety Promotion/Fall Prevention:   assistive device/personal items within reach   activity supervised   clutter free environment maintained   fall prevention program maintained   lighting adjusted   mobility aid in reach   nonskid shoes/slippers when out of bed   safety round/check completed   toileting scheduled  Taken 9/9/2022 2250 by Amisha Grossman, RN  Safety Promotion/Fall Prevention:   activity supervised   assistive device/personal items within reach   clutter free environment maintained   fall prevention program maintained   lighting adjusted   mobility aid in reach   nonskid shoes/slippers when out of bed   safety round/check completed   toileting scheduled  Taken 9/9/2022 2051 by Amisha Grossman, RN  Safety Promotion/Fall Prevention:   activity supervised   assistive device/personal items within reach   clutter free environment maintained   fall prevention program maintained   lighting adjusted   mobility aid in  reach   nonskid shoes/slippers when out of bed   safety round/check completed   toileting scheduled  Intervention: Prevent and Manage VTE (Venous Thromboembolism) Risk  Recent Flowsheet Documentation  Taken 9/10/2022 0201 by Amisha Grossman, RN  Activity Management:   activity adjusted per tolerance   activity encouraged  Taken 9/10/2022 0053 by Amisha Grossman, RN  Activity Management:   activity adjusted per tolerance   activity encouraged  Taken 9/9/2022 2250 by Amisha Grossman, RN  Activity Management:   activity adjusted per tolerance   activity encouraged  Taken 9/9/2022 2051 by Amisha Grossman, RN  Activity Management:   activity adjusted per tolerance   activity encouraged  Goal: Optimal Comfort and Wellbeing  Outcome: Ongoing, Progressing  Goal: Readiness for Transition of Care  Outcome: Ongoing, Progressing   Goal Outcome Evaluation:              Outcome Evaluation: Pt's BP low at the beginning of shift. Pt also has c/o of left abdominal pain radiating to her back. 500 cc bolus ordered and CT abd ordered. PO PRN pain meds given as needed. Up adlib.

## 2022-09-10 NOTE — CONSULTS
Patient Name: Liya Carlson  Age/Sex: 37 y.o. female  : 1985  MRN: [id number]    Date of Admission: 2022  Date of Encounter Visit: 09/10/22  Encounter Provider: Lester Dailey MD  Place of Service: [facility] CARDIOLOGY      Referring Provider: Cristian Henson MD  Patient Care Team:  Servando Gao MD as PCP - General (Internal Medicine)    Subjective:     Consulted for: chest pain, PE     Chief Complaint: chest pain       History of Present Illness:  Liya Carlson is a 37 y.o. female with a history of seizures, pancreatitis, dyslipidemia and migraines.    She was recently admitted to [facility] with complaints of shortness of breath and chest pain. She was found to have a acute pulmonary embolism in the subsegmental right lower lobe area. She was not hypoxic. A hypercoagulable workup was performed and patient was discharged on Eliquis with plans to follow up with hematology.     She presented to the emergency department with complaints of chest pain. She described the pain as a squeezing in the center of her chest. She reported worsening with deep inspiration, and associated shortness of breath. She also feels lightheaded and weak with ambulation.   Upon arrival to the ED, patient was tachycardic with  and SpO2 100%.  EKG on arrival showed ST with rate 106, no acute ST changes. CXR showed no acute findings. CTA chest showed showed known thrombus in the subsegmental branch to the right lower lobe. Troponin was negative. BNP normal.     She received 500mL of normal saline in the ER and was admitted for further workup. Her Eliquis was continued.  She still complains of chest discomfort that increases with deep inspiration and is central.    Previous testing:   ECHO 22  The ejection fraction biplane was calculated at 64%.   The estimated ejection fraction is 60-65%.   Overall left ventricular function is normal.   Trace pulmonic  insufficiency present.    Past Medical History:  Past Medical History:   Diagnosis Date   • Gastric ulcer    • Headache, tension-type    • Migraine    • Pancreatitis    • Seizures (HCC)        Past Surgical History:   Procedure Laterality Date   • LAPAROSCOPIC TUBAL LIGATION         Home Medications:   Medications Prior to Admission   Medication Sig Dispense Refill Last Dose   • amitriptyline (ELAVIL) 75 MG tablet Take 1 tablet by mouth Every Night.      • apixaban (ELIQUIS) 5 MG tablet tablet Take 5 mg by mouth 2 (Two) Times a Day.      • Brivaracetam (Briviact) 100 MG tablet Take 1 tablet by mouth 2 (Two) Times a Day.      • busPIRone (BUSPAR) 10 MG tablet Take 1 tablet by mouth 2 (Two) Times a Day. 60 tablet 3    • carvedilol (Coreg) 3.125 MG tablet Take 1 tablet by mouth 2 (Two) Times a Day With Meals. 60 tablet 3    • fluticasone (Flonase) 50 MCG/ACT nasal spray 2 sprays into the nostril(s) as directed by provider Daily for 30 days. Administer 2 sprays in each nostril for each dose. 18.2 mL 3    • hydrOXYzine (ATARAX) 25 MG tablet Take 1 tablet by mouth Every 8 (Eight) Hours As Needed for Anxiety. Do not drive 30 tablet 3    • ondansetron (ZOFRAN) 4 MG tablet Take 1 tablet by mouth Every 8 (Eight) Hours As Needed for Nausea or Vomiting. 30 tablet 3    • pantoprazole (PROTONIX) 40 MG EC tablet Take 1 tablet by mouth Daily. 30 tablet 3    • promethazine (PHENERGAN) 25 MG tablet Take 1 tablet by mouth Every 8 (Eight) Hours As Needed for Nausea or Vomiting. 20 tablet 0    • rizatriptan (Maxalt) 5 MG tablet Take 2 tablets by mouth 1 (One) Time As Needed for Migraine. May repeat in 2 hours if needed 12 tablet 0    • tiZANidine (ZANAFLEX) 4 MG tablet Take 1 tablet by mouth Every 8 (Eight) Hours As Needed for Muscle Spasms (Do not drive). 90 tablet 4    • topiramate (TOPAMAX) 50 MG tablet Take 1 tablet by mouth Every Night. (Patient taking differently: Take 50 mg by mouth 2 (Two) Times a Day. 50 MG in the Am and 100mg  "at night) 30 tablet 3    • zolpidem (AMBIEN) 10 MG tablet 1 p.o. at bedtime as needed sleep 30 tablet 3    • promethazine (PHENERGAN) 25 MG tablet Take 1 tablet by mouth every 8 (eight) hours as needed for nausea or vomiting. 30 tablet 3        Allergies:  No Known Allergies    Past Social History:  Social History     Socioeconomic History   • Marital status:    Tobacco Use   • Smoking status: Former Smoker   Substance and Sexual Activity   • Alcohol use: Yes     Comment: OCCASIONAL   • Drug use: No        Past Family History:  Family History   Problem Relation Age of Onset   • Diabetes Mother    • Hypertension Mother    • Migraines Mother    • Dementia Mother    • Arthritis Mother    • Kidney disease Mother    • Hypertension Father    • Diabetes Father    • Seizures Father    • Stroke Father          Review of Systems:  All systems reviewed. Pertinent positives identified in HPI. All other systems are negative.       Objective:     Objective:  Temp:  [97.6 °F (36.4 °C)-98 °F (36.7 °C)] 97.9 °F (36.6 °C)  Heart Rate:  [69-86] 84  Resp:  [16-18] 18  BP: ()/(60-91) 129/90    Intake/Output Summary (Last 24 hours) at 9/10/2022 1356  Last data filed at 9/9/2022 1700  Gross per 24 hour   Intake 240 ml   Output --   Net 240 ml     Body mass index is 27.1 kg/m².      09/08/22 2339 09/09/22 0039   Weight: 78.5 kg (173 lb) 78.5 kg (173 lb)           Physical Exam:   Temp:  [97.6 °F (36.4 °C)-98 °F (36.7 °C)] 97.9 °F (36.6 °C)  Heart Rate:  [69-86] 84  Resp:  [16-18] 18  BP: ()/(60-91) 129/90    Intake/Output Summary (Last 24 hours) at 9/10/2022 1356  Last data filed at 9/9/2022 1700  Gross per 24 hour   Intake 240 ml   Output --   Net 240 ml     Flowsheet Rows    Flowsheet Row First Filed Value   Admission Height 170.2 cm (67\") Documented at 09/08/2022 2011   Admission Weight 78.5 kg (173 lb) Documented at 09/08/2022 2339          General Appearance:    Alert, cooperative, in no acute distress   Head:    " Normocephalic, without obvious abnormality, atraumatic       Neck/Lymph   No adenopathy, supple, no thyromegaly, no carotid bruit, no    JVD   Lungs:     Clear to auscultation bilaterally, no wheezes, rales, or     rhonchi    Cardiac:    Normal rate, regular rhythm, no murmur, no rub, no gallop   Chest Wall:    No abnormalities observed   GI:     Normal bowel sounds, soft, nontender, nondistended,            no rebound tenderness   Extremities:   No cyanosis, clubbing, or edema   Circulatory/Peripheral Vascular :   Pulses palpable and equal bilaterally   Integumentary:   No bleeding or rash. Normal temperature       Neurologic:   Cranial nerves 2 - 12 grossly intact, sensation intact             .Lab Review:     Results from last 7 days   Lab Units 09/10/22  0646 09/09/22  0528 09/08/22  2122   SODIUM mmol/L 137 137 141   POTASSIUM mmol/L 3.8 3.7 3.9   CHLORIDE mmol/L 107 110* 109*   CO2 mmol/L 21.6* 20.6* 23.3   BUN mg/dL 8 8 10   CREATININE mg/dL 0.74 0.74 0.98   GLUCOSE mg/dL 93 83 68   CALCIUM mg/dL 9.0 9.0 9.7       Results from last 7 days   Lab Units 09/08/22 2122 09/04/22 2232 09/04/22  1815   TROPONIN I ng/mL  --  <0.010 <0.010   TROPONIN T ng/mL <0.010  --   --      Results from last 7 days   Lab Units 09/10/22  0646   WBC 10*3/mm3 5.28   HEMOGLOBIN g/dL 12.6   HEMATOCRIT % 36.9   PLATELETS 10*3/mm3 333     Results from last 7 days   Lab Units 09/08/22 2122 09/05/22  0140   INR  1.13* 1.0   APTT s  --  29.9         Results from last 7 days   Lab Units 09/08/22 2122   MAGNESIUM mg/dL 2.2         Results from last 7 days   Lab Units 09/08/22 2122   PROBNP pg/mL 35.8               Imaging:    Imaging Results (Most Recent)     Procedure Component Value Units Date/Time    XR Chest 2 View [[id number]] Collected: 09/08/22 2104     Updated: 09/09/22 0626    Narrative:      CHEST 2 VIEWS     HISTORY: Chest pain.     PA and lateral views of the chest demonstrate the heart to be within  normal limits in size.  There is no evidence of infiltrate, effusion or  of congestive failure.     This report was finalized on 9/9/2022 6:22 AM by Dr. Tang Henry M.D.       CT Angiogram Chest [[id number]] Collected: 09/08/22 2303     Updated: 09/08/22 2318    Narrative:      CT ANGIOGRAM OF THE CHEST     HISTORY: Shortness of breath     COMPARISON: None available.     TECHNIQUE: Axial CT imaging was obtained through the thorax. IV contrast  was administered. Three-D reformatted images were obtained.     FINDINGS:  The patient reportedly had a pulmonary embolus in a branch of the right  lower lobe on prior CTA of the chest performed at an outside facility.  This examination is severely degraded by poor timing of the contrast  bolus, despite 2 studies. I think there is probably some thrombus within  a subsegmental pulmonary arterial branch to the right lower lobe, best  seen on image 92 of the coronal series and 242 of the second axial  series. No definite new pulmonary emboli are seen, although again, this  is a poor quality study. There is no evidence of right heart strain.  There is a common origin of the brachiocephalic artery and left common  carotid artery. There is no evidence of dissection. No coronary artery  calcifications are seen. Thoracic aorta is normal in caliber. Right lobe  of the thyroid gland appears somewhat hypoplastic. Trachea is within  normal limits. There is a small hiatal hernia. Mediastinal lymph nodes  do not appear pathologically enlarged. There is no pleural or  pericardial effusion. There is no evidence of pulmonary infarction. No  acute infiltrates are seen. Images through the upper abdomen do not  demonstrate any acute abnormalities. No acute osseous abnormalities are  seen.       Impression:         1. Suspected thrombus identified within a subsegmental branch to the  right lower lobe. Full assessment is significantly degraded by poor  technique. No definite additional emboli are seen. There is no  evidence  of right heart strain or pulmonary infarction.     Radiation dose reduction techniques were utilized, including automated  exposure control and exposure modulation based on body size.     This report was finalized on 9/8/2022 11:14 PM by Dr. Rhina Austin M.D.                 EKG:         BASELINE:       I personally viewed and interpreted the patient's EKG/Telemetry data.    Assessment:   Assessment & Plan   1.  Chest/epigastric pain  2.  Elevated liver enzymes  3.  Subsegmental right lower lobe pulmonary embolus: Low risk.  Cardiac biomarkers normal.  Normal right ventricular size and function  4.  Major depressive disorder.      -I have reviewed the prior transthoracic echocardiogram and CT scan.  Small subsegmental pulmonary embolus with no evidence of right ventricular dilation or dysfunction.  -Cardiac biomarkers are negative.  -I do not think chest pain is cardiac in etiology and I would not recommend ischemic work-up.  -Pulmonary embolism is low risk and no indication for catheter directed therapy.  I will sign off.    Thank you for allowing me to participate in the care of Liya Carlson. Feel free to contact me directly with any further questions or concerns.    Lester Dailey MD  [facility]  09/10/22  13:56 EDT

## 2022-09-10 NOTE — PLAN OF CARE
Problem: Adult Inpatient Plan of Care  Goal: Plan of Care Review  Outcome: Ongoing, Progressing  Flowsheets (Taken 9/10/2022 1709)  Progress: no change  Plan of Care Reviewed With: patient  Outcome Evaluation: vss. c/o LLQ decreased with prn pain medication. CT scan completed, results pending.   Goal Outcome Evaluation:  Plan of Care Reviewed With: patient        Progress: no change  Outcome Evaluation: vss. c/o LLQ decreased with prn pain medication. CT scan completed, results pending.

## 2022-09-11 ENCOUNTER — ANESTHESIA EVENT (OUTPATIENT)
Dept: GASTROENTEROLOGY | Facility: HOSPITAL | Age: 37
End: 2022-09-11

## 2022-09-11 ENCOUNTER — ANESTHESIA (OUTPATIENT)
Dept: GASTROENTEROLOGY | Facility: HOSPITAL | Age: 37
End: 2022-09-11

## 2022-09-11 PROBLEM — R93.3 ABNORMAL CT SCAN, SIGMOID COLON: Status: ACTIVE | Noted: 2022-09-08

## 2022-09-11 LAB
ALBUMIN SERPL-MCNC: 4 G/DL (ref 3.5–5.2)
ALBUMIN/GLOB SERPL: 1.5 G/DL
ALP SERPL-CCNC: 92 U/L (ref 39–117)
ALT SERPL W P-5'-P-CCNC: 81 U/L (ref 1–33)
ANION GAP SERPL CALCULATED.3IONS-SCNC: 9 MMOL/L (ref 5–15)
AST SERPL-CCNC: 28 U/L (ref 1–32)
BASOPHILS # BLD AUTO: 0.04 10*3/MM3 (ref 0–0.2)
BASOPHILS NFR BLD AUTO: 0.6 % (ref 0–1.5)
BILIRUB SERPL-MCNC: 0.2 MG/DL (ref 0–1.2)
BUN SERPL-MCNC: 9 MG/DL (ref 6–20)
BUN/CREAT SERPL: 10.6 (ref 7–25)
CALCIUM SPEC-SCNC: 9 MG/DL (ref 8.6–10.5)
CHLORIDE SERPL-SCNC: 106 MMOL/L (ref 98–107)
CO2 SERPL-SCNC: 19 MMOL/L (ref 22–29)
CREAT SERPL-MCNC: 0.85 MG/DL (ref 0.57–1)
DEPRECATED RDW RBC AUTO: 44.1 FL (ref 37–54)
EGFRCR SERPLBLD CKD-EPI 2021: 89.5 ML/MIN/1.73
EOSINOPHIL # BLD AUTO: 0.18 10*3/MM3 (ref 0–0.4)
EOSINOPHIL NFR BLD AUTO: 2.7 % (ref 0.3–6.2)
ERYTHROCYTE [DISTWIDTH] IN BLOOD BY AUTOMATED COUNT: 13.6 % (ref 12.3–15.4)
GLOBULIN UR ELPH-MCNC: 2.7 GM/DL
GLUCOSE SERPL-MCNC: 97 MG/DL (ref 65–99)
HCT VFR BLD AUTO: 41.4 % (ref 34–46.6)
HGB BLD-MCNC: 13.7 G/DL (ref 12–15.9)
IMM GRANULOCYTES # BLD AUTO: 0.08 10*3/MM3 (ref 0–0.05)
IMM GRANULOCYTES NFR BLD AUTO: 1.2 % (ref 0–0.5)
LYMPHOCYTES # BLD AUTO: 1.93 10*3/MM3 (ref 0.7–3.1)
LYMPHOCYTES NFR BLD AUTO: 29 % (ref 19.6–45.3)
MCH RBC QN AUTO: 29.6 PG (ref 26.6–33)
MCHC RBC AUTO-ENTMCNC: 33.1 G/DL (ref 31.5–35.7)
MCV RBC AUTO: 89.4 FL (ref 79–97)
MONOCYTES # BLD AUTO: 0.36 10*3/MM3 (ref 0.1–0.9)
MONOCYTES NFR BLD AUTO: 5.4 % (ref 5–12)
NEUTROPHILS NFR BLD AUTO: 4.07 10*3/MM3 (ref 1.7–7)
NEUTROPHILS NFR BLD AUTO: 61.1 % (ref 42.7–76)
NRBC BLD AUTO-RTO: 0 /100 WBC (ref 0–0.2)
PLATELET # BLD AUTO: 369 10*3/MM3 (ref 140–450)
PMV BLD AUTO: 9.5 FL (ref 6–12)
POTASSIUM SERPL-SCNC: 3.7 MMOL/L (ref 3.5–5.2)
PROT SERPL-MCNC: 6.7 G/DL (ref 6–8.5)
RBC # BLD AUTO: 4.63 10*6/MM3 (ref 3.77–5.28)
SODIUM SERPL-SCNC: 134 MMOL/L (ref 136–145)
WBC NRBC COR # BLD: 6.66 10*3/MM3 (ref 3.4–10.8)

## 2022-09-11 PROCEDURE — 25010000002 PROPOFOL 10 MG/ML EMULSION: Performed by: STUDENT IN AN ORGANIZED HEALTH CARE EDUCATION/TRAINING PROGRAM

## 2022-09-11 PROCEDURE — G0378 HOSPITAL OBSERVATION PER HR: HCPCS

## 2022-09-11 PROCEDURE — 45330 DIAGNOSTIC SIGMOIDOSCOPY: CPT | Performed by: INTERNAL MEDICINE

## 2022-09-11 PROCEDURE — 99204 OFFICE O/P NEW MOD 45 MIN: CPT | Performed by: INTERNAL MEDICINE

## 2022-09-11 PROCEDURE — 80053 COMPREHEN METABOLIC PANEL: CPT | Performed by: INTERNAL MEDICINE

## 2022-09-11 PROCEDURE — 85025 COMPLETE CBC W/AUTO DIFF WBC: CPT | Performed by: INTERNAL MEDICINE

## 2022-09-11 RX ORDER — SODIUM CHLORIDE, SODIUM LACTATE, POTASSIUM CHLORIDE, CALCIUM CHLORIDE 600; 310; 30; 20 MG/100ML; MG/100ML; MG/100ML; MG/100ML
INJECTION, SOLUTION INTRAVENOUS CONTINUOUS PRN
Status: DISCONTINUED | OUTPATIENT
Start: 2022-09-11 | End: 2022-09-11 | Stop reason: SURG

## 2022-09-11 RX ORDER — SODIUM CHLORIDE 0.9 % (FLUSH) 0.9 %
10 SYRINGE (ML) INJECTION AS NEEDED
Status: DISCONTINUED | OUTPATIENT
Start: 2022-09-11 | End: 2022-09-11 | Stop reason: HOSPADM

## 2022-09-11 RX ORDER — LIDOCAINE HYDROCHLORIDE 20 MG/ML
INJECTION, SOLUTION INFILTRATION; PERINEURAL AS NEEDED
Status: DISCONTINUED | OUTPATIENT
Start: 2022-09-11 | End: 2022-09-11 | Stop reason: SURG

## 2022-09-11 RX ORDER — SODIUM CHLORIDE 9 MG/ML
1000 INJECTION, SOLUTION INTRAVENOUS CONTINUOUS
Status: DISCONTINUED | OUTPATIENT
Start: 2022-09-11 | End: 2022-09-12 | Stop reason: HOSPADM

## 2022-09-11 RX ORDER — LIDOCAINE HYDROCHLORIDE 10 MG/ML
0.5 INJECTION, SOLUTION INFILTRATION; PERINEURAL ONCE AS NEEDED
Status: DISCONTINUED | OUTPATIENT
Start: 2022-09-11 | End: 2022-09-11 | Stop reason: HOSPADM

## 2022-09-11 RX ORDER — PROPOFOL 10 MG/ML
VIAL (ML) INTRAVENOUS AS NEEDED
Status: DISCONTINUED | OUTPATIENT
Start: 2022-09-11 | End: 2022-09-11 | Stop reason: SURG

## 2022-09-11 RX ADMIN — BUSPIRONE HYDROCHLORIDE 10 MG: 10 TABLET ORAL at 21:04

## 2022-09-11 RX ADMIN — PANTOPRAZOLE SODIUM 40 MG: 40 TABLET, DELAYED RELEASE ORAL at 08:24

## 2022-09-11 RX ADMIN — OXYCODONE 5 MG: 5 TABLET ORAL at 13:31

## 2022-09-11 RX ADMIN — ACETAMINOPHEN 650 MG: 325 TABLET, FILM COATED ORAL at 16:51

## 2022-09-11 RX ADMIN — BUSPIRONE HYDROCHLORIDE 10 MG: 10 TABLET ORAL at 08:24

## 2022-09-11 RX ADMIN — BRIVARACETAM 75 MG: 25 TABLET, FILM COATED ORAL at 23:53

## 2022-09-11 RX ADMIN — LIDOCAINE HYDROCHLORIDE 60 MG: 20 INJECTION, SOLUTION INFILTRATION; PERINEURAL at 11:46

## 2022-09-11 RX ADMIN — PROPOFOL 180 MCG/KG/MIN: 10 INJECTION, EMULSION INTRAVENOUS at 11:47

## 2022-09-11 RX ADMIN — OXYCODONE 5 MG: 5 TABLET ORAL at 07:13

## 2022-09-11 RX ADMIN — ZOLPIDEM TARTRATE 5 MG: 5 TABLET, FILM COATED ORAL at 22:43

## 2022-09-11 RX ADMIN — OXYCODONE 5 MG: 5 TABLET ORAL at 18:05

## 2022-09-11 RX ADMIN — TOPIRAMATE 100 MG: 100 TABLET, FILM COATED ORAL at 21:03

## 2022-09-11 RX ADMIN — SODIUM CHLORIDE 125 ML/HR: 9 INJECTION, SOLUTION INTRAVENOUS at 13:31

## 2022-09-11 RX ADMIN — TOPIRAMATE 50 MG: 50 TABLET, FILM COATED ORAL at 08:24

## 2022-09-11 RX ADMIN — OXYCODONE 5 MG: 5 TABLET ORAL at 22:43

## 2022-09-11 RX ADMIN — BRIVARACETAM 100 MG: 25 TABLET, FILM COATED ORAL at 08:24

## 2022-09-11 RX ADMIN — APIXABAN 5 MG: 5 TABLET, FILM COATED ORAL at 21:04

## 2022-09-11 RX ADMIN — SODIUM CHLORIDE 1000 ML: 9 INJECTION, SOLUTION INTRAVENOUS at 11:43

## 2022-09-11 RX ADMIN — SODIUM CHLORIDE, POTASSIUM CHLORIDE, SODIUM LACTATE AND CALCIUM CHLORIDE: 600; 310; 30; 20 INJECTION, SOLUTION INTRAVENOUS at 11:40

## 2022-09-11 RX ADMIN — OXYCODONE 5 MG: 5 TABLET ORAL at 02:49

## 2022-09-11 RX ADMIN — CARVEDILOL 3.12 MG: 3.12 TABLET, FILM COATED ORAL at 08:24

## 2022-09-11 RX ADMIN — PROPOFOL 100 MG: 10 INJECTION, EMULSION INTRAVENOUS at 11:46

## 2022-09-11 RX ADMIN — AMITRIPTYLINE HYDROCHLORIDE 75 MG: 50 TABLET, FILM COATED ORAL at 21:04

## 2022-09-11 NOTE — CONSULTS
"[facility], [place], operated by [facility]  Initial Inpatient Consult Note    Referring Provider: A    Reason for Consultation: Abdominal pain with abnormal CT imaging    Subjective     History of present illness:      Thank you for requesting my opinion.    37-year-old woman with a past medical history as below admitted on the ninth for chest pain following recent diagnosis of pulmonary embolism.  She had been seen at [facility] and had full work-up there and had been initiated on anticoagulation.  Gastroenterology is consulted for abdominal pain with abnormal CT imaging.    She reports she has been having left lower quadrant pain.  It is been tender to the touch.  She did have some intermittent bright red blood per rectum prior to events of her PE.  She has not had any history of diverticulitis.  No family history of colon cancer.  No history of IBD in her family.    Her lab work-up is notable for elevated ALT and AST that are improving.  ALT this morning is 81, down from 101 yesterday, AST down to 28 from 46 yesterday.  Alkaline phosphatase and total bilirubin are normal.    CT of the abdomen pelvis was performed yesterday for complaints of epigastric pain.  Note is made of \"wall thickening of the distal sigmoid colon… apple core configuration… single large diverticulum.  Differential diagnosis includes colitis, adenocarcinoma and peristalsis.\"    She has not eaten this morning.    She reports her breathing has improved    She has not gotten any Eliquis this morning.  She got a dose last night.    Past Medical History:  Past Medical History:   Diagnosis Date   • Gastric ulcer    • Headache, tension-type    • Migraine    • Pancreatitis    • Seizures (HCC)        Past Surgical History:  Past Surgical History:   Procedure Laterality Date   • LAPAROSCOPIC TUBAL LIGATION          Social History:   Social History     Tobacco Use   • Smoking status: Former Smoker   • Smokeless tobacco: Not on file   Substance Use " Topics   • Alcohol use: Yes     Comment: OCCASIONAL        Family History:  Family History   Problem Relation Age of Onset   • Diabetes Mother    • Hypertension Mother    • Migraines Mother    • Dementia Mother    • Arthritis Mother    • Kidney disease Mother    • Hypertension Father    • Diabetes Father    • Seizures Father    • Stroke Father        Home Meds:  Medications Prior to Admission   Medication Sig Dispense Refill Last Dose   • amitriptyline (ELAVIL) 75 MG tablet Take 1 tablet by mouth Every Night.      • apixaban (ELIQUIS) 5 MG tablet tablet Take 5 mg by mouth 2 (Two) Times a Day.      • Brivaracetam (Briviact) 100 MG tablet Take 1 tablet by mouth 2 (Two) Times a Day.      • busPIRone (BUSPAR) 10 MG tablet Take 1 tablet by mouth 2 (Two) Times a Day. 60 tablet 3    • carvedilol (Coreg) 3.125 MG tablet Take 1 tablet by mouth 2 (Two) Times a Day With Meals. 60 tablet 3    • fluticasone (Flonase) 50 MCG/ACT nasal spray 2 sprays into the nostril(s) as directed by provider Daily for 30 days. Administer 2 sprays in each nostril for each dose. 18.2 mL 3    • hydrOXYzine (ATARAX) 25 MG tablet Take 1 tablet by mouth Every 8 (Eight) Hours As Needed for Anxiety. Do not drive 30 tablet 3    • ondansetron (ZOFRAN) 4 MG tablet Take 1 tablet by mouth Every 8 (Eight) Hours As Needed for Nausea or Vomiting. 30 tablet 3    • pantoprazole (PROTONIX) 40 MG EC tablet Take 1 tablet by mouth Daily. 30 tablet 3    • promethazine (PHENERGAN) 25 MG tablet Take 1 tablet by mouth Every 8 (Eight) Hours As Needed for Nausea or Vomiting. 20 tablet 0    • rizatriptan (Maxalt) 5 MG tablet Take 2 tablets by mouth 1 (One) Time As Needed for Migraine. May repeat in 2 hours if needed 12 tablet 0    • tiZANidine (ZANAFLEX) 4 MG tablet Take 1 tablet by mouth Every 8 (Eight) Hours As Needed for Muscle Spasms (Do not drive). 90 tablet 4    • topiramate (TOPAMAX) 50 MG tablet Take 1 tablet by mouth Every Night. (Patient taking differently: Take  50 mg by mouth 2 (Two) Times a Day. 50 MG in the Am and 100mg at night) 30 tablet 3    • zolpidem (AMBIEN) 10 MG tablet 1 p.o. at bedtime as needed sleep 30 tablet 3    • promethazine (PHENERGAN) 25 MG tablet Take 1 tablet by mouth every 8 (eight) hours as needed for nausea or vomiting. 30 tablet 3        Current Meds:   amitriptyline, 75 mg, Oral, Nightly  apixaban, 5 mg, Oral, BID  brivaracetam, 100 mg, Oral, BID  busPIRone, 10 mg, Oral, BID  carvedilol, 3.125 mg, Oral, BID With Meals  fluticasone, 2 spray, Nasal, Daily  pantoprazole, 40 mg, Oral, Daily  sodium chloride, 10 mL, Intravenous, Q12H  topiramate, 100 mg, Oral, Nightly   And  topiramate, 50 mg, Oral, Daily        Allergies:  No Known Allergies    Review of Systems  All systems were reviewed and negative except for:  Gastrointestinal: positive for  pain     Objective     Vital Signs  Temp:  [97.9 °F (36.6 °C)-98.5 °F (36.9 °C)] 98.1 °F (36.7 °C)  Heart Rate:  [] 101  Resp:  [16-18] 18  BP: (116-144)/(83-99) 116/83    Physical Exam:  Constitutional:   Alert, cooperative, in no acute distress, appears stated age   Eyes:           Lids and lashes normal, conjunctivae and sclerae normal,   no icterus   Ears, nose, mouth and throat:  Normal appearance of external ears and nose, no oral l  lesions, no thrush, oral mucosa moist   Respiratory:    Clear to auscultation, respirations regular, even and             unlabored    Cardiovascular:   Regular rhythm and normal rate, normal S1 and S2, no        murmur, no gallop, palpable distal pulses, no lower extremity edema   Gastrointestinal:    Soft, nondistended, tender to palpation LLQ, no guarding, no rebound tenderness, normal bowel sounds, no palpable masses or organomegaly  Rectal exam: deferred   Musculoskeletal:  Normal station, no atrophy, no tenderness to palpation, normal digits and nails   Skin:  Normal color, no bleeding, bruising, rashes or lesions   Lymphatics:  No palpable cervical or  supraclavicular adenopathy   Psychiatric:  Judgement and insight: normal   Orientation to person, place and time: normal   Mood and affect: normal       Results Review:   I reviewed the patient's new clinical results.    Results from last 7 days   Lab Units 09/11/22  0550 09/10/22  0646 09/09/22  0528   WBC 10*3/mm3 6.66 5.28 6.85   HEMOGLOBIN g/dL 13.7 12.6 12.2   HEMATOCRIT % 41.4 36.9 34.9   PLATELETS 10*3/mm3 369 333 358       Results from last 7 days   Lab Units 09/11/22  0550 09/10/22  0646 09/09/22 0528 09/08/22 2122   SODIUM mmol/L 134* 137 137 141   POTASSIUM mmol/L 3.7 3.8 3.7 3.9   CHLORIDE mmol/L 106 107 110* 109*   CO2 mmol/L 19.0* 21.6* 20.6* 23.3   BUN mg/dL 9 8 8 10   CREATININE mg/dL 0.85 0.74 0.74 0.98   CALCIUM mg/dL 9.0 9.0 9.0 9.7   BILIRUBIN mg/dL 0.2 <0.2  --  0.3   ALK PHOS U/L 92 86  --  104   ALT (SGPT) U/L 81* 101*  --  102*   AST (SGOT) U/L 28 46*  --  59*   GLUCOSE mg/dL 97 93 83 68       Results from last 7 days   Lab Units 09/08/22 2122 09/05/22  0140   INR  1.13* 1.0       Lab Results   Lab Value Date/Time    LIPASE 23 09/04/2022 2340       Radiology:  Imaging Results (Last 72 Hours)     Procedure Component Value Units Date/Time    CT Abdomen Pelvis Without Contrast [[id number]] Collected: 09/10/22 1833     Updated: 09/10/22 1833    Narrative:        Patient: TOSHIA AYERS  Time Out: 18:33  Exam(s): CT ABDOMEN + PELVIS Without Contrast     EXAM:    CT Abdomen and Pelvis Without Intravenous Contrast    CLINICAL HISTORY:     Reason for exam: Abdominal pain, acute (Ped 0-17y).    TECHNIQUE:    Axial computed tomography images of the abdomen and pelvis without   intravenous contrast.  CTDI is 11.5 mGy and DLP is 597.1 mGy-cm.  This CT   exam was performed according to the principle of TYSHAWN (As Low As   Reasonably Achievable) by using one or more of the following dose   reduction techniques: automated exposure control, adjustment of the mA   and or kV according to patient size, and or  use of iterative   reconstruction technique.    COMPARISON:    No relevant prior studies available.    FINDINGS:    Lung bases:  Unremarkable.  No mass.  No consolidation.     ABDOMEN:    Liver:  Unremarkable.    Gallbladder and bile ducts:  Unremarkable.  No calcified stones.  No   ductal dilation.    Pancreas:  Unremarkable.  No ductal dilation.    Spleen:  Unremarkable.  No splenomegaly.    Adrenals:  Unremarkable.  No mass.    Kidneys and ureters:  Unremarkable.  No obstructing stones.  No   hydronephrosis.    Stomach and bowel:  Wall thickening of the distal sigmoid colon is   circumferential, apple core configuration without 3 mm wall thickening   over 4.5 cm in length, where there is a single large diverticulum   measuring 1.5 cm.  No pericolonic inflammation.     PELVIS:    Appendix:  Normal appendix.    Bladder:  Unremarkable.  No stones.    Reproductive:  Unremarkable as visualized.     ABDOMEN and PELVIS:    Intraperitoneal space:  Unremarkable.  No free air.  No significant   fluid collection.    Bones joints:  No acute fracture.  No dislocation.    Soft tissues:  Unremarkable.    Vasculature:  Unremarkable.  No abdominal aortic aneurysm.    Lymph nodes:  Unremarkable.  No enlarged lymph nodes.    IMPRESSION:         Distal sigmoid colon wall thickening where there is a single large   diverticulum.  Differential diagnosis includes colitis, adenocarcinoma   and peristalsis.  Follow-up colonoscopy recommended.      Impression:          Electronically signed by Alexis Noguera MD on 09-10-22 at 1833    XR Chest 2 View [[id number]] Collected: 09/08/22 2104     Updated: 09/09/22 0626    Narrative:      CHEST 2 VIEWS     HISTORY: Chest pain.     PA and lateral views of the chest demonstrate the heart to be within  normal limits in size. There is no evidence of infiltrate, effusion or  of congestive failure.     This report was finalized on 9/9/2022 6:22 AM by Dr. Tang Henry M.D.       CT Angiogram Chest  [[id number]] Collected: 09/08/22 2303     Updated: 09/08/22 2318    Narrative:      CT ANGIOGRAM OF THE CHEST     HISTORY: Shortness of breath     COMPARISON: None available.     TECHNIQUE: Axial CT imaging was obtained through the thorax. IV contrast  was administered. Three-D reformatted images were obtained.     FINDINGS:  The patient reportedly had a pulmonary embolus in a branch of the right  lower lobe on prior CTA of the chest performed at an outside facility.  This examination is severely degraded by poor timing of the contrast  bolus, despite 2 studies. I think there is probably some thrombus within  a subsegmental pulmonary arterial branch to the right lower lobe, best  seen on image 92 of the coronal series and 242 of the second axial  series. No definite new pulmonary emboli are seen, although again, this  is a poor quality study. There is no evidence of right heart strain.  There is a common origin of the brachiocephalic artery and left common  carotid artery. There is no evidence of dissection. No coronary artery  calcifications are seen. Thoracic aorta is normal in caliber. Right lobe  of the thyroid gland appears somewhat hypoplastic. Trachea is within  normal limits. There is a small hiatal hernia. Mediastinal lymph nodes  do not appear pathologically enlarged. There is no pleural or  pericardial effusion. There is no evidence of pulmonary infarction. No  acute infiltrates are seen. Images through the upper abdomen do not  demonstrate any acute abnormalities. No acute osseous abnormalities are  seen.       Impression:         1. Suspected thrombus identified within a subsegmental branch to the  right lower lobe. Full assessment is significantly degraded by poor  technique. No definite additional emboli are seen. There is no evidence  of right heart strain or pulmonary infarction.     Radiation dose reduction techniques were utilized, including automated  exposure control and exposure modulation based  on body size.     This report was finalized on 9/8/2022 11:14 PM by Dr. Rhina Austin M.D.             Assessment & Plan       Seizures (HCC)    Dyslipidemia    Single subsegmental pulmonary embolism without acute cor pulmonale (HCC)      Impression  1.  Abnormal CT imaging of the sigmoid colon: Differential of colitis versus adenocarcinoma versus peristalsis    2.  Recent diagnosis of acute pulmonary embolism on anticoagulation    3.  Left lower quadrant pain: Concerning with #1    4.  Elevated liver enzymes: Improving    Plan  I discussed options with the patient and her family member.  She has had been on Lovenox.  We discussed that she is going to need direct visualization.  Options include flexible sigmoidoscopy today which would be diagnostic only versus transitioning her to heparin and consideration of a full colonoscopy on Tuesday.  She does understand the increased bleeding risk while she is on anticoagulation but does opt to do a flexible sigmoidoscopy today.  She does understand that if there is a mass or colitis that needs to be biopsy that she would need to come off anticoagulation with a heparin bridge and have a full endoscopic evaluation in a few days.  Discussed perforation risk and bleeding risk with her that is increased with her anticoagulation.  Both she and her family member verbalized understanding and agreed to proceed.    I discussed the patients findings and my recommendations with patient, family and nursing staff    All necessary PPE, including face mask and eye protection, were worn during this encounter.  Hand sanitization was performed both before and after the patient interaction.    Sierra Kerr MD  [facility]      Dictated utilizing Dragon dictation

## 2022-09-11 NOTE — CASE MANAGEMENT/SOCIAL WORK
Discharge Planning Assessment  [facility]     Patient Name: Liya Carlson  MRN: [id number]  Today's Date: 9/11/2022    Admit Date: 9/8/2022     Discharge Needs Assessment     Row Name 09/11/22 1417       Living Environment    People in Home spouse    Name(s) of People in Home Parvez Valencia    Current Living Arrangements home    Primary Care Provided by self    Provides Primary Care For no one    Family Caregiver if Needed spouse    Family Caregiver Names  Erik [phone]    Quality of Family Relationships helpful    Able to Return to Prior Arrangements yes       Resource/Environmental Concerns    Resource/Environmental Concerns none    Transportation Concerns none       Transition Planning    Patient/Family Anticipates Transition to home with family    Patient/Family Anticipated Services at Transition none    Transportation Anticipated family or friend will provide       Discharge Needs Assessment    Readmission Within the Last 30 Days no previous admission in last 30 days    Equipment Currently Used at Home none    Concerns to be Addressed denies needs/concerns at this time    Equipment Needed After Discharge none               Discharge Plan     Row Name 09/11/22 1416       Plan    Plan Return home with spouse    Patient/Family in Agreement with Plan yes    Plan Comments Spoke with patient at bedside.  She lives with  Erik [phone], is IADL, uses no DME, has never used HH.  PCP is Dr. enrique Gao and pharmacy is [facility].  Patient was given $10 co-pay card for Eliquis.  She states  or family will help her if needed.  She plans to return home at [place].  CCP will follow.  Tim GALE              Continued Care and Services - Admitted Since 9/8/2022    Coordination has not been started for this encounter.       Expected Discharge Date and Time     Expected Discharge Date Expected Discharge Time    Sep 10, 2022          Demographic Summary     Row Name 09/11/22 1418        General Information    Admission Type observation    Arrived From home    Referral Source admission list    Reason for Consult discharge planning    Preferred Language English               Functional Status     Row Name 09/11/22 1417       Functional Status    Usual Activity Tolerance good    Current Activity Tolerance moderate       Functional Status, IADL    Medications independent    Housekeeping independent    Shopping independent;assistive person       Mental Status    General Appearance WDL WDL       Mental Status Summary    Recent Changes in Mental Status/Cognitive Functioning no changes                    Becky S. [place], RN

## 2022-09-11 NOTE — ANESTHESIA PREPROCEDURE EVALUATION
Anesthesia Evaluation     Patient summary reviewed and Nursing notes reviewed   no history of anesthetic complications:  NPO Solid Status: > 8 hours  NPO Liquid Status: > 2 hours           Airway   Mallampati: II  TM distance: >3 FB  Neck ROM: full  Dental      Pulmonary    (+) pulmonary embolism,   Cardiovascular     ECG reviewed  PT is on anticoagulation therapy        Neuro/Psych  (+) seizures,    GI/Hepatic/Renal/Endo    (+)  PUD,      ROS Comment: Pancreatitis    Musculoskeletal     Abdominal    Substance History      OB/GYN          Other                        Anesthesia Plan    ASA 2     MAC     intravenous induction     Anesthetic plan, risks, benefits, and alternatives have been provided, discussed and informed consent has been obtained with: patient.        CODE STATUS:    Code Status (Patient has no pulse and is not breathing): CPR (Attempt to Resuscitate)  Medical Interventions (Patient has pulse or is breathing): Full Support

## 2022-09-11 NOTE — PROGRESS NOTES
Patient in endoscopy for prolonged duration, will reattempt to see her later or in the morning with repeat labs.  Chart reviewed, no acute events noted.

## 2022-09-11 NOTE — ANESTHESIA POSTPROCEDURE EVALUATION
"Patient: Liya Carlson    Procedure Summary     Date: 09/11/22 Room / Location:  [facility] /  [facility]    Anesthesia Start: 1138 Anesthesia Stop: 1207    Procedure: SIGMOIDOSCOPY FLEXIBLE TO DESCENDING COLON (N/A ) Diagnosis:       Abnormal CT scan, sigmoid colon      (Abnormal CT scan, sigmoid colon [R93.3])    Surgeons: Sierra Kerr MD Provider: Song Irving MD    Anesthesia Type: MAC ASA Status: 2          Anesthesia Type: MAC    Vitals  Vitals Value Taken Time   BP 96/64 09/11/22 1254   Temp 36.4 °C (97.5 °F) 09/11/22 1214   Pulse 64 09/11/22 1254   Resp 12 09/11/22 1254   SpO2 100 % 09/11/22 1254           Post Anesthesia Care and Evaluation    Patient location during evaluation: bedside  Patient participation: complete - patient participated  Level of consciousness: sleepy but conscious  Pain score: 0  Pain management: adequate    Airway patency: patent  Anesthetic complications: No anesthetic complications    Cardiovascular status: acceptable  Respiratory status: acceptable  Hydration status: acceptable    Comments: BP 96/64 (BP Location: Left arm, Patient Position: Sitting)   Pulse 64   Temp 36.4 °C (97.5 °F) (Temporal)   Resp 12   Ht 170.2 cm (67\")   Wt 78.5 kg (173 lb)   SpO2 100%   BMI 27.10 kg/m²         "

## 2022-09-11 NOTE — PLAN OF CARE
Problem: Adult Inpatient Plan of Care  Goal: Plan of Care Review  Outcome: Ongoing, Progressing  Flowsheets (Taken 9/11/2022 6502)  Progress: no change  Plan of Care Reviewed With: patient  Outcome Evaluation: vss, bp low after sigmoidoscopy. llq pain and tenderness present, prn medication provided.   Goal Outcome Evaluation:  Plan of Care Reviewed With: patient        Progress: no change  Outcome Evaluation: vss, bp low after sigmoidoscopy. llq pain and tenderness present, prn medication provided.

## 2022-09-11 NOTE — PLAN OF CARE
Problem: Adult Inpatient Plan of Care  Goal: Plan of Care Review  Outcome: Ongoing, Progressing  Flowsheets (Taken 9/11/2022 0409)  Outcome Evaluation: VSS. LLQ pain cont. PRN pain meds given. GI consulted and to see.  Goal: Patient-Specific Goal (Individualized)  Outcome: Ongoing, Progressing  Goal: Absence of Hospital-Acquired Illness or Injury  Outcome: Ongoing, Progressing  Intervention: Identify and Manage Fall Risk  Recent Flowsheet Documentation  Taken 9/11/2022 0255 by Amisha Grossman, RN  Safety Promotion/Fall Prevention:   activity supervised   assistive device/personal items within reach   clutter free environment maintained   fall prevention program maintained   lighting adjusted   mobility aid in reach   nonskid shoes/slippers when out of bed   safety round/check completed   toileting scheduled  Taken 9/11/2022 0043 by Amisha Grossman, RN  Safety Promotion/Fall Prevention:   activity supervised   assistive device/personal items within reach   clutter free environment maintained   fall prevention program maintained   lighting adjusted   mobility aid in reach   nonskid shoes/slippers when out of bed   safety round/check completed   toileting scheduled  Taken 9/10/2022 2240 by Amisha Grossman, RN  Safety Promotion/Fall Prevention:   activity supervised   assistive device/personal items within reach   clutter free environment maintained   fall prevention program maintained   lighting adjusted   mobility aid in reach   nonskid shoes/slippers when out of bed   safety round/check completed   toileting scheduled  Taken 9/10/2022 2044 by Amisha Grossman, RN  Safety Promotion/Fall Prevention:   activity supervised   assistive device/personal items within reach   clutter free environment maintained   fall prevention program maintained   lighting adjusted   mobility aid in reach   nonskid shoes/slippers when out of bed   safety round/check completed   toileting scheduled  Intervention: Prevent  and Manage VTE (Venous Thromboembolism) Risk  Recent Flowsheet Documentation  Taken 9/11/2022 0255 by Amisha Grossman, RN  Activity Management:   activity adjusted per tolerance   activity encouraged  Taken 9/11/2022 0043 by Amisha Grossman, RN  Activity Management:   activity adjusted per tolerance   activity encouraged  Taken 9/10/2022 2240 by Amisha Grossman, RN  Activity Management:   activity adjusted per tolerance   activity encouraged  Taken 9/10/2022 2044 by Amisha Grossman, RN  Activity Management:   activity adjusted per tolerance   activity encouraged  Intervention: Prevent Infection  Recent Flowsheet Documentation  Taken 9/10/2022 2240 by Amisha Grossman, RN  Infection Prevention: rest/sleep promoted  Goal: Optimal Comfort and Wellbeing  Outcome: Ongoing, Progressing  Intervention: Provide Person-Centered Care  Recent Flowsheet Documentation  Taken 9/10/2022 2044 by Amisha Grossman, RN  Trust Relationship/Rapport:   care explained   choices provided   emotional support provided   questions answered   questions encouraged   reassurance provided   thoughts/feelings acknowledged  Goal: Readiness for Transition of Care  Outcome: Ongoing, Progressing   Goal Outcome Evaluation:              Outcome Evaluation: VSS. LLQ pain cont. PRN pain meds given. GI consulted and to see.

## 2022-09-12 ENCOUNTER — READMISSION MANAGEMENT (OUTPATIENT)
Dept: CALL CENTER | Facility: HOSPITAL | Age: 37
End: 2022-09-12

## 2022-09-12 VITALS
OXYGEN SATURATION: 100 % | RESPIRATION RATE: 18 BRPM | SYSTOLIC BLOOD PRESSURE: 91 MMHG | TEMPERATURE: 97.5 F | DIASTOLIC BLOOD PRESSURE: 57 MMHG | WEIGHT: 173 LBS | HEIGHT: 67 IN | HEART RATE: 74 BPM | BODY MASS INDEX: 27.15 KG/M2

## 2022-09-12 LAB
ALBUMIN SERPL-MCNC: 3.8 G/DL (ref 3.5–5.2)
ALBUMIN/GLOB SERPL: 1.3 G/DL
ALP SERPL-CCNC: 90 U/L (ref 39–117)
ALT SERPL W P-5'-P-CCNC: 62 U/L (ref 1–33)
ANION GAP SERPL CALCULATED.3IONS-SCNC: 8.3 MMOL/L (ref 5–15)
AST SERPL-CCNC: 24 U/L (ref 1–32)
BASOPHILS # BLD AUTO: 0.03 10*3/MM3 (ref 0–0.2)
BASOPHILS NFR BLD AUTO: 0.4 % (ref 0–1.5)
BILIRUB SERPL-MCNC: <0.2 MG/DL (ref 0–1.2)
BUN SERPL-MCNC: 7 MG/DL (ref 6–20)
BUN/CREAT SERPL: 8.6 (ref 7–25)
CALCIUM SPEC-SCNC: 8.8 MG/DL (ref 8.6–10.5)
CHLORIDE SERPL-SCNC: 107 MMOL/L (ref 98–107)
CO2 SERPL-SCNC: 20.7 MMOL/L (ref 22–29)
CREAT SERPL-MCNC: 0.81 MG/DL (ref 0.57–1)
DEPRECATED RDW RBC AUTO: 41 FL (ref 37–54)
EGFRCR SERPLBLD CKD-EPI 2021: 94.8 ML/MIN/1.73
EOSINOPHIL # BLD AUTO: 0.14 10*3/MM3 (ref 0–0.4)
EOSINOPHIL NFR BLD AUTO: 2 % (ref 0.3–6.2)
ERYTHROCYTE [DISTWIDTH] IN BLOOD BY AUTOMATED COUNT: 12.9 % (ref 12.3–15.4)
GLOBULIN UR ELPH-MCNC: 3 GM/DL
GLUCOSE SERPL-MCNC: 102 MG/DL (ref 65–99)
HCT VFR BLD AUTO: 36.6 % (ref 34–46.6)
HGB BLD-MCNC: 12.7 G/DL (ref 12–15.9)
IMM GRANULOCYTES # BLD AUTO: 0.09 10*3/MM3 (ref 0–0.05)
IMM GRANULOCYTES NFR BLD AUTO: 1.3 % (ref 0–0.5)
LYMPHOCYTES # BLD AUTO: 1.72 10*3/MM3 (ref 0.7–3.1)
LYMPHOCYTES NFR BLD AUTO: 25 % (ref 19.6–45.3)
MCH RBC QN AUTO: 29.8 PG (ref 26.6–33)
MCHC RBC AUTO-ENTMCNC: 34.7 G/DL (ref 31.5–35.7)
MCV RBC AUTO: 85.9 FL (ref 79–97)
MONOCYTES # BLD AUTO: 0.35 10*3/MM3 (ref 0.1–0.9)
MONOCYTES NFR BLD AUTO: 5.1 % (ref 5–12)
NEUTROPHILS NFR BLD AUTO: 4.55 10*3/MM3 (ref 1.7–7)
NEUTROPHILS NFR BLD AUTO: 66.2 % (ref 42.7–76)
NRBC BLD AUTO-RTO: 0 /100 WBC (ref 0–0.2)
PLATELET # BLD AUTO: 319 10*3/MM3 (ref 140–450)
PMV BLD AUTO: 9.4 FL (ref 6–12)
POTASSIUM SERPL-SCNC: 3.9 MMOL/L (ref 3.5–5.2)
PROT SERPL-MCNC: 6.8 G/DL (ref 6–8.5)
RBC # BLD AUTO: 4.26 10*6/MM3 (ref 3.77–5.28)
SODIUM SERPL-SCNC: 136 MMOL/L (ref 136–145)
WBC NRBC COR # BLD: 6.88 10*3/MM3 (ref 3.4–10.8)

## 2022-09-12 PROCEDURE — 99213 OFFICE O/P EST LOW 20 MIN: CPT | Performed by: INTERNAL MEDICINE

## 2022-09-12 PROCEDURE — 85025 COMPLETE CBC W/AUTO DIFF WBC: CPT | Performed by: INTERNAL MEDICINE

## 2022-09-12 PROCEDURE — G0378 HOSPITAL OBSERVATION PER HR: HCPCS

## 2022-09-12 PROCEDURE — 80053 COMPREHEN METABOLIC PANEL: CPT | Performed by: INTERNAL MEDICINE

## 2022-09-12 RX ADMIN — TIZANIDINE 4 MG: 4 TABLET ORAL at 07:17

## 2022-09-12 RX ADMIN — PANTOPRAZOLE SODIUM 40 MG: 40 TABLET, DELAYED RELEASE ORAL at 08:36

## 2022-09-12 RX ADMIN — BRIVARACETAM 100 MG: 25 TABLET, FILM COATED ORAL at 08:39

## 2022-09-12 RX ADMIN — OXYCODONE 5 MG: 5 TABLET ORAL at 04:47

## 2022-09-12 RX ADMIN — SODIUM CHLORIDE 125 ML/HR: 9 INJECTION, SOLUTION INTRAVENOUS at 04:47

## 2022-09-12 RX ADMIN — TOPIRAMATE 50 MG: 50 TABLET, FILM COATED ORAL at 08:36

## 2022-09-12 RX ADMIN — CARVEDILOL 3.12 MG: 3.12 TABLET, FILM COATED ORAL at 08:36

## 2022-09-12 RX ADMIN — APIXABAN 5 MG: 5 TABLET, FILM COATED ORAL at 08:36

## 2022-09-12 RX ADMIN — OXYCODONE 5 MG: 5 TABLET ORAL at 12:29

## 2022-09-12 RX ADMIN — BUSPIRONE HYDROCHLORIDE 10 MG: 10 TABLET ORAL at 08:36

## 2022-09-12 RX ADMIN — FLUTICASONE PROPIONATE 2 SPRAY: 50 SPRAY, METERED NASAL at 08:37

## 2022-09-12 RX ADMIN — OXYCODONE 5 MG: 5 TABLET ORAL at 17:55

## 2022-09-12 NOTE — PROGRESS NOTES
[facility]  Inpatient Progress Note    Reason for Followup:  Abnormal CT scan colon    Subjective     Interval History:   No new issues this am    Current Facility-Administered Medications:   •  acetaminophen (TYLENOL) tablet 650 mg, 650 mg, Oral, Q4H PRN, 650 mg at 09/11/22 1651 **OR** acetaminophen (TYLENOL) 160 MG/5ML solution 650 mg, 650 mg, Oral, Q4H PRN **OR** acetaminophen (TYLENOL) suppository 650 mg, 650 mg, Rectal, Q4H PRN, Sierra Kerr MD  •  aluminum-magnesium hydroxide-simethicone (MAALOX MAX) 400-400-40 MG/5ML suspension 15 mL, 15 mL, Oral, Q6H PRN, Tam Buckley MD, 15 mL at 09/09/22 1704  •  amitriptyline (ELAVIL) tablet 75 mg, 75 mg, Oral, Nightly, Sierra Kerr MD, 75 mg at 09/11/22 2104  •  apixaban (ELIQUIS) tablet 5 mg, 5 mg, Oral, BID, Sierra Kerr MD, 5 mg at 09/11/22 2104  •  brivaracetam (BRIVIACT) tablet 100 mg, 100 mg, Oral, BID, Sierra Kerr MD, 75 mg at 09/11/22 2353  •  busPIRone (BUSPAR) tablet 10 mg, 10 mg, Oral, BID, Sierra Kerr MD, 10 mg at 09/11/22 2104  •  carvedilol (COREG) tablet 3.125 mg, 3.125 mg, Oral, BID With Meals, Sierra Kerr MD, 3.125 mg at 09/11/22 0824  •  fluticasone (FLONASE) 50 MCG/ACT nasal spray 2 spray, 2 spray, Nasal, Daily, Sierra Kerr MD, 2 spray at 09/10/22 0840  •  hydrOXYzine (ATARAX) tablet 25 mg, 25 mg, Oral, Q8H PRN, Sierra Kerr MD  •  Lidocaine Viscous HCl (XYLOCAINE) 2 % solution 15 mL, 15 mL, Mouth/Throat, Q6H PRN, Tam Buckley MD, 15 mL at 09/09/22 1704  •  nitroglycerin (NITROSTAT) SL tablet 0.4 mg, 0.4 mg, Sublingual, Q5 Min PRN, Sierra Kerr MD  •  ondansetron (ZOFRAN) injection 4 mg, 4 mg, Intravenous, Q6H PRN, Sierra Kerr MD  •  ondansetron (ZOFRAN) tablet 4 mg, 4 mg, Oral, Q8H PRN, Sierra Kerr MD  •  oxyCODONE (ROXICODONE) immediate release tablet 5 mg, 5 mg, Oral, Q4H PRN, Sierra Kerr MD, 5 mg at 09/12/22 3362  •  pantoprazole (PROTONIX) EC tablet 40 mg, 40  mg, Oral, Daily, Sierra Kerr MD, 40 mg at 09/11/22 0824  •  promethazine (PHENERGAN) tablet 25 mg, 25 mg, Oral, Q8H PRN, Sierra Kerr MD, 25 mg at 09/10/22 1633  •  sodium chloride 0.9 % flush 10 mL, 10 mL, Intravenous, PRNCirilo Sarah A, MD  •  [COMPLETED] Insert peripheral IV, , , Once **AND** sodium chloride 0.9 % flush 10 mL, 10 mL, Intravenous, PRN, Sierra Kerr MD  •  sodium chloride 0.9 % flush 10 mL, 10 mL, Intravenous, PRN, Sierra Kerr MD  •  sodium chloride 0.9 % infusion 1,000 mL, 1,000 mL, Intravenous, Continuous, Sierra Kerr MD, Stopped at 09/11/22 1223  •  sodium chloride 0.9 % infusion, 125 mL/hr, Intravenous, Continuous, Sierra Kerr MD, Last Rate: 125 mL/hr at 09/12/22 0447, 125 mL/hr at 09/12/22 0447  •  tiZANidine (ZANAFLEX) tablet 4 mg, 4 mg, Oral, Q8H PRN, Sierra Kerr MD, 4 mg at 09/12/22 0717  •  topiramate (TOPAMAX) tablet 100 mg, 100 mg, Oral, Nightly, 100 mg at 09/11/22 2103 **AND** topiramate (TOPAMAX) tablet 50 mg, 50 mg, Oral, Daily, Sierra Kerr MD, 50 mg at 09/11/22 0824  •  zolpidem (AMBIEN) tablet 5 mg, 5 mg, Oral, Nightly PRN, Sierra Kerr MD, 5 mg at 09/11/22 2243  Review of Systems:    All systems were reviewed and negative except for:  Gastrointestinal: positive for  nausea    Objective     Vital Signs  Temp:  [97.4 °F (36.3 °C)-98.9 °F (37.2 °C)] 98.9 °F (37.2 °C)  Heart Rate:  [] 98  Resp:  [12-18] 18  BP: ()/(38-92) 121/85  Body mass index is 27.1 kg/m².    Intake/Output Summary (Last 24 hours) at 9/12/2022 0732  Last data filed at 9/11/2022 1700  Gross per 24 hour   Intake 1480 ml   Output --   Net 1480 ml     No intake/output data recorded.     Physical Exam:   General: patient awake, alert and cooperative   Eyes: Normal lids and lashes, no scleral icterus   Neck: supple, normal ROM   Skin: warm and dry, not jaundiced   Cardiovascular: regular rhythm and rate, no murmurs auscultated   Pulm: clear to auscultation  bilaterally, regular and unlabored   Abdomen: soft, nontender, nondistended; normal bowel sounds   Rectal: deferred   Extremities: no rash or edema   Psychiatric: Normal mood and behavior; memory intact     Results Review:     I reviewed the patient's new clinical results.  I reviewed the patient's other test results and agree with the interpretation    Results from last 7 days   Lab Units 09/12/22  0510 09/11/22  0550 09/10/22  0646   WBC 10*3/mm3 6.88 6.66 5.28   HEMOGLOBIN g/dL 12.7 13.7 12.6   HEMATOCRIT % 36.6 41.4 36.9   PLATELETS 10*3/mm3 319 369 333     Results from last 7 days   Lab Units 09/12/22  0510 09/11/22  0550 09/10/22  0646   SODIUM mmol/L 136 134* 137   POTASSIUM mmol/L 3.9 3.7 3.8   CHLORIDE mmol/L 107 106 107   CO2 mmol/L 20.7* 19.0* 21.6*   BUN mg/dL 7 9 8   CREATININE mg/dL 0.81 0.85 0.74   CALCIUM mg/dL 8.8 9.0 9.0   BILIRUBIN mg/dL <0.2 0.2 <0.2   ALK PHOS U/L 90 92 86   ALT (SGPT) U/L 62* 81* 101*   AST (SGOT) U/L 24 28 46*   GLUCOSE mg/dL 102* 97 93     Results from last 7 days   Lab Units 09/08/22  2122   INR  1.13*     Lab Results   Lab Value Date/Time    LIPASE 23 09/04/2022 2340       Radiology:  [unfilled]      Assessment & Plan   Assessment:   1.  Colitis  2.  Abnormal CT scan A/P    All problems new to me today    Plan:   Flex sig yesterday noted, prep not optimal but no significant lesion or inflammation identified from rectum to splenic flexure.  Recommend full colonoscopy once able to safely hold NOAC for 48hrs and follow up CT scan A/P in 4 weeks.  Will arrange for close office f/u in 2 weeks with Dr Kerr or [facility]. GI will see again as needed.      I discussed the patient's findings and my recommendations with patient.          Frank Jaquez M.D.  [facility]  [address] [place] [address]  Office: [phone]

## 2022-09-12 NOTE — DISCHARGE SUMMARY
[facility]    Date of Discharge:  9/12/2022    PCP: Servando Gao MD    Discharge Diagnosis:   Active Hospital Problems    Diagnosis  POA   • **Abnormal CT scan, sigmoid colon [R93.3]  Unknown   • Single subsegmental pulmonary embolism without acute cor pulmonale (HCC) [I26.93]  Yes   • Dyslipidemia [E78.5]  Yes   • Seizures (HCC) [R56.9]  Yes      Resolved Hospital Problems   No resolved problems to display.     Procedure(s):  SIGMOIDOSCOPY FLEXIBLE TO DESCENDING COLON  09/11 1139 FLEXIBLE SIGMOIDOSCOPY  Consults     Date and Time Order Name Status Description    9/11/2022 12:33 AM Inpatient Gastroenterology Consult Completed     9/9/2022  3:22 PM Inpatient Pulmonology Consult Completed     9/9/2022  3:22 PM Inpatient Cardiology Consult Completed     9/8/2022 10:56 PM [facility] (on-call MD unless specified) Details      9/8/2022 10:52 PM [facility] (on-call MD unless specified) Details          Hospital Course  37 y.o. female with past medical history significant for seizures, dyslipidemia, presented to the hospital, had findings consistent with pulmonary emboli.  Patient is on anticoagulation plan, she was recently seen at [facility].  She currently has Eliquis, she will continue that.  Patient is very anxious, she also complained of abdominal pain.  She underwent evaluation by gastroenterology.  Patient underwent flexible sigmoidoscopy yesterday.  Findings were reviewed, it was a poor study, patient would need colonoscopy at some point in near future.  I have seen and examined patient at bedside, patient has been cleared by GI for discharge.  Total time spent on discharge and management is more than 30 minutes.      Condition on Discharge: Improved.     Temp:  [97.5 °F (36.4 °C)-98.9 °F (37.2 °C)] 97.5 °F (36.4 °C)  Heart Rate:  [74-98] 74  Resp:  [18] 18  BP: ()/(49-85) 91/57  Body mass index is 27.1 kg/m².    Physical Exam   General, awake and  alert.  Head and ENT, normocephalic and atraumatic.  Lungs, symmetric expansion, equal air entry bilaterally.  Heart, regular rate and rhythm.  Abdomen, soft and nontender.  Extremities, no clubbing or cyanosis.  Neuro, no focal deficits.  Skin: Warm and no rash.  Psych, normal mood and affect.  Musculoskeletal, joint examination is grossly normal.    Disposition: Home or Self Care       Discharge Medications      Changes to Medications      Instructions Start Date   topiramate 50 MG tablet  Commonly known as: TOPAMAX  What changed:   · when to take this  · additional instructions   50 mg, Oral, Nightly         Continue These Medications      Instructions Start Date   amitriptyline 75 MG tablet  Commonly known as: ELAVIL   1 tablet, Oral, Nightly      apixaban 5 MG tablet tablet  Commonly known as: ELIQUIS   5 mg, Oral, 2 Times Daily      Briviact 100 MG tablet  Generic drug: Brivaracetam   1 tablet, Oral, 2 Times Daily      busPIRone 10 MG tablet  Commonly known as: BUSPAR   10 mg, Oral, 2 Times Daily      carvedilol 3.125 MG tablet  Commonly known as: Coreg   3.125 mg, Oral, 2 Times Daily With Meals      fluticasone 50 MCG/ACT nasal spray  Commonly known as: Flonase   2 sprays, Nasal, Daily, Administer 2 sprays in each nostril for each dose.      hydrOXYzine 25 MG tablet  Commonly known as: ATARAX   25 mg, Oral, Every 8 Hours PRN, Do not drive      ondansetron 4 MG tablet  Commonly known as: ZOFRAN   4 mg, Oral, Every 8 Hours PRN      pantoprazole 40 MG EC tablet  Commonly known as: PROTONIX   40 mg, Oral, Daily      promethazine 25 MG tablet  Commonly known as: PHENERGAN   25 mg, Oral, Every 8 Hours PRN      promethazine 25 MG tablet  Commonly known as: PHENERGAN   25 mg, Oral, Every 8 Hours PRN      rizatriptan 5 MG tablet  Commonly known as: Maxalt   10 mg, Oral, Once As Needed, May repeat in 2 hours if needed      tiZANidine 4 MG tablet  Commonly known as: ZANAFLEX   4 mg, Oral, Every 8 Hours PRN      zolpidem  10 MG tablet  Commonly known as: AMBIEN   1 p.o. at bedtime as needed sleep              Additional Instructions for the Follow-ups that You Need to Schedule     Discharge Follow-up with PCP   As directed       Currently Documented PCP:    Servando Gao MD    PCP Phone Number:    [phone]     Follow Up Details: Follow-up with PCP in 7 days.            Follow-up Information     Servando Gao MD .    Specialty: Internal Medicine  Why: Follow-up with PCP in 7 days.  Contact information:  [address] [place] [address]  [phone]                           Reginaldo Liang MD  [facility]  09/12/22    Discharge time spent greater than 30 minutes.

## 2022-09-12 NOTE — OUTREACH NOTE
Prep Survey    Flowsheet Row Responses   [facility] facility patient discharged from? [place]   Is LACE score < 7 ? Yes   Emergency Room discharge w/ pulse ox? No   Eligibility [facility]   Date of Admission 09/08/22   Date of Discharge 09/12/22   Discharge Disposition Home or Self Care   Discharge diagnosis Seizures/Single subsegmental PE   Does the patient have one of the following disease processes/diagnoses(primary or secondary)? Other   Does the patient have Home health ordered? No   Is there a DME ordered? No   Prep survey completed? Yes          PHYLICIA LEO - Registered Nurse

## 2022-09-12 NOTE — PLAN OF CARE
Goal Outcome Evaluation:           Progress: no change  Outcome Evaluation: PT RESTED WELL MOST OF SHIFT.  MEDICATED TWICE FOR PAIN IN LEFT SIDE WITH PRN OXYCODONE.  BLOOD PRESSURE IMPROVED.  MOTHER AT BEDSIDE. NO FURTHER NEEDS NOTED.

## 2022-09-12 NOTE — DISCHARGE INSTRUCTIONS
Recommend full colonoscopy once able to safely hold anticoagulation for 48hrs and follow up CT scan A/P in 4 weeks.    Will arrange for close office f/u in 2 weeks with Dr Kerr or [facility]- gastroenterology

## 2022-09-12 NOTE — PLAN OF CARE
Problem: Adult Inpatient Plan of Care  Goal: Absence of Hospital-Acquired Illness or Injury  Intervention: Identify and Manage Fall Risk  Recent Flowsheet Documentation  Taken 9/12/2022 1439 by Dang Porter, RN  Safety Promotion/Fall Prevention:   activity supervised   assistive device/personal items within reach   clutter free environment maintained   safety round/check completed   room organization consistent  Intervention: Prevent and Manage VTE (Venous Thromboembolism) Risk  Recent Flowsheet Documentation  Taken 9/12/2022 1439 by Dang Porter, RN  Activity Management: activity adjusted per tolerance   Goal Outcome Evaluation:         Patient up with standby assist, pain per MAR. IV fluids continued. [facility] follow up outpatient. Will monitor

## 2022-09-13 ENCOUNTER — TRANSITIONAL CARE MANAGEMENT TELEPHONE ENCOUNTER (OUTPATIENT)
Dept: CALL CENTER | Facility: HOSPITAL | Age: 37
End: 2022-09-13

## 2022-09-13 NOTE — OUTREACH NOTE
Call Center TCM Note    Flowsheet Row Responses   [facility] patient discharged from? [place]   Does the patient have one of the following disease processes/diagnoses(primary or secondary)? Other   TCM attempt successful? Yes   Discharge diagnosis Seizures/Single subsegmental PE   Does the patient have all medications ordered at discharge? N/A   Has home health visited the patient within 72 hours of discharge? N/A   Psychosocial issues? No   Did the patient receive a copy of their discharge instructions? Yes   What is the patient's perception of their health status since discharge? Improving   Is the patient/caregiver able to teach back signs and symptoms related to disease process for when to call PCP? Yes   Is the patient/caregiver able to teach back signs and symptoms related to disease process for when to call 911? Yes   Is the patient/caregiver able to teach back the hierarchy of who to call/visit for symptoms/problems? PCP, Specialist, Home health nurse, Urgent Care, ED, 911 Yes   If the patient is a current smoker, are they able to teach back resources for cessation? Not a smoker   TCM call completed? Yes   Wrap up additional comments Pt doing ok, D/C DX: Epigastric pain, Single subsegmental PE ** No changes to medications. No questions at this time. Pt is sched with PCP Dr Gao on 09/15/2022.          Jeanie El MA    9/13/2022, 16:27 EDT

## 2022-09-13 NOTE — CASE MANAGEMENT/SOCIAL WORK
Case Management Discharge Note      Final Note: home         Selected Continued Care - Discharged on 9/12/2022 Admission date: 9/8/2022 - Discharge disposition: Home or Self Care    Destination    No services have been selected for the patient.              Durable Medical Equipment    No services have been selected for the patient.              Dialysis/Infusion    No services have been selected for the patient.              Home Medical Care    No services have been selected for the patient.              Therapy    No services have been selected for the patient.              Community Resources    No services have been selected for the patient.              Community & DME    No services have been selected for the patient.                  Transportation Services  Private: Car    Final Discharge Disposition Code: 01 - home or self-care

## 2022-09-13 NOTE — OUTREACH NOTE
Call Center TCM Note    Flowsheet Row Responses   [facility] [place] patient discharged from? [place]   Does the patient have one of the following disease processes/diagnoses(primary or secondary)? Other   TCM attempt successful? No   Unsuccessful attempts Attempt 1          Jeanie El MA    9/13/2022, 15:21 EDT

## 2022-09-14 ENCOUNTER — APPOINTMENT (OUTPATIENT)
Dept: GENERAL RADIOLOGY | Facility: HOSPITAL | Age: 37
End: 2022-09-14

## 2022-09-14 ENCOUNTER — APPOINTMENT (OUTPATIENT)
Dept: CT IMAGING | Facility: HOSPITAL | Age: 37
End: 2022-09-14

## 2022-09-14 ENCOUNTER — HOSPITAL ENCOUNTER (EMERGENCY)
Facility: HOSPITAL | Age: 37
Discharge: HOME | End: 2022-09-14
Attending: EMERGENCY MEDICINE | Admitting: EMERGENCY MEDICINE

## 2022-09-14 VITALS
TEMPERATURE: 97.5 F | HEART RATE: 80 BPM | SYSTOLIC BLOOD PRESSURE: 132 MMHG | RESPIRATION RATE: 18 BRPM | DIASTOLIC BLOOD PRESSURE: 90 MMHG | OXYGEN SATURATION: 100 %

## 2022-09-14 DIAGNOSIS — Z79.01 ANTICOAGULATED: ICD-10-CM

## 2022-09-14 DIAGNOSIS — S30.1XXA CONTUSION OF ABDOMINAL WALL, INITIAL ENCOUNTER: ICD-10-CM

## 2022-09-14 DIAGNOSIS — S20.212A CHEST WALL CONTUSION, LEFT, INITIAL ENCOUNTER: Primary | ICD-10-CM
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
## 2022-09-14 LAB
ALBUMIN SERPL-MCNC: 3.9 G/DL (ref 3.5–5.2)
ALBUMIN/GLOB SERPL: 1.4 G/DL
ALP SERPL-CCNC: 89 U/L (ref 39–117)
ALT SERPL W P-5'-P-CCNC: 43 U/L (ref 1–33)
ANION GAP SERPL CALCULATED.3IONS-SCNC: 8.5 MMOL/L (ref 5–15)
AST SERPL-CCNC: 14 U/L (ref 1–32)
BASOPHILS # BLD AUTO: 0.03 10*3/MM3 (ref 0–0.2)
BASOPHILS NFR BLD AUTO: 0.5 % (ref 0–1.5)
BILIRUB SERPL-MCNC: <0.2 MG/DL (ref 0–1.2)
BILIRUB UR QL STRIP: NEGATIVE
BUN SERPL-MCNC: 11 MG/DL (ref 6–20)
BUN/CREAT SERPL: 14.3 (ref 7–25)
CALCIUM SPEC-SCNC: 9.3 MG/DL (ref 8.6–10.5)
CHLORIDE SERPL-SCNC: 110 MMOL/L (ref 98–107)
CLARITY UR: CLEAR
CO2 SERPL-SCNC: 21.5 MMOL/L (ref 22–29)
COLOR UR: YELLOW
CREAT SERPL-MCNC: 0.77 MG/DL (ref 0.57–1)
DEPRECATED RDW RBC AUTO: 45.8 FL (ref 37–54)
EGFRCR SERPLBLD CKD-EPI 2021: 100.8 ML/MIN/1.73
EOSINOPHIL # BLD AUTO: 0.1 10*3/MM3 (ref 0–0.4)
EOSINOPHIL NFR BLD AUTO: 1.7 % (ref 0.3–6.2)
ERYTHROCYTE [DISTWIDTH] IN BLOOD BY AUTOMATED COUNT: 13.6 % (ref 12.3–15.4)
GLOBULIN UR ELPH-MCNC: 2.8 GM/DL
GLUCOSE SERPL-MCNC: 73 MG/DL (ref 65–99)
GLUCOSE UR STRIP-MCNC: NEGATIVE MG/DL
HCG SERPL QL: NEGATIVE
HCT VFR BLD AUTO: 37.2 % (ref 34–46.6)
HGB BLD-MCNC: 12.3 G/DL (ref 12–15.9)
HGB UR QL STRIP.AUTO: NEGATIVE
HOLD SPECIMEN: NORMAL
IMM GRANULOCYTES # BLD AUTO: 0.08 10*3/MM3 (ref 0–0.05)
IMM GRANULOCYTES NFR BLD AUTO: 1.3 % (ref 0–0.5)
KETONES UR QL STRIP: NEGATIVE
LEUKOCYTE ESTERASE UR QL STRIP.AUTO: NEGATIVE
LIPASE SERPL-CCNC: 22 U/L (ref 13–60)
LYMPHOCYTES # BLD AUTO: 1.42 10*3/MM3 (ref 0.7–3.1)
LYMPHOCYTES NFR BLD AUTO: 23.8 % (ref 19.6–45.3)
MCH RBC QN AUTO: 30.1 PG (ref 26.6–33)
MCHC RBC AUTO-ENTMCNC: 33.1 G/DL (ref 31.5–35.7)
MCV RBC AUTO: 91.2 FL (ref 79–97)
MONOCYTES # BLD AUTO: 0.32 10*3/MM3 (ref 0.1–0.9)
MONOCYTES NFR BLD AUTO: 5.4 % (ref 5–12)
NEUTROPHILS NFR BLD AUTO: 4.01 10*3/MM3 (ref 1.7–7)
NEUTROPHILS NFR BLD AUTO: 67.3 % (ref 42.7–76)
NITRITE UR QL STRIP: NEGATIVE
NRBC BLD AUTO-RTO: 0 /100 WBC (ref 0–0.2)
PH UR STRIP.AUTO: 5.5 [PH] (ref 5–8)
PLATELET # BLD AUTO: 350 10*3/MM3 (ref 140–450)
PMV BLD AUTO: 9.9 FL (ref 6–12)
POTASSIUM SERPL-SCNC: 3.8 MMOL/L (ref 3.5–5.2)
PROT SERPL-MCNC: 6.7 G/DL (ref 6–8.5)
PROT UR QL STRIP: NEGATIVE
QT INTERVAL: 368 MS
RBC # BLD AUTO: 4.08 10*6/MM3 (ref 3.77–5.28)
SODIUM SERPL-SCNC: 140 MMOL/L (ref 136–145)
SP GR UR STRIP: 1.02 (ref 1–1.03)
TROPONIN T SERPL-MCNC: <0.01 NG/ML (ref 0–0.03)
TROPONIN T SERPL-MCNC: <0.01 NG/ML (ref 0–0.03)
UROBILINOGEN UR QL STRIP: NORMAL
WBC NRBC COR # BLD: 5.96 10*3/MM3 (ref 3.4–10.8)
WHOLE BLOOD HOLD COAG: NORMAL
WHOLE BLOOD HOLD SPECIMEN: NORMAL

## 2022-09-14 PROCEDURE — 71046 X-RAY EXAM CHEST 2 VIEWS: CPT

## 2022-09-14 PROCEDURE — 84484 ASSAY OF TROPONIN QUANT: CPT | Performed by: EMERGENCY MEDICINE

## 2022-09-14 PROCEDURE — 85025 COMPLETE CBC W/AUTO DIFF WBC: CPT

## 2022-09-14 PROCEDURE — 74176 CT ABD & PELVIS W/O CONTRAST: CPT

## 2022-09-14 PROCEDURE — 93005 ELECTROCARDIOGRAM TRACING: CPT

## 2022-09-14 PROCEDURE — 93005 ELECTROCARDIOGRAM TRACING: CPT | Performed by: EMERGENCY MEDICINE

## 2022-09-14 PROCEDURE — 81003 URINALYSIS AUTO W/O SCOPE: CPT | Performed by: PHYSICIAN ASSISTANT

## 2022-09-14 PROCEDURE — 80053 COMPREHEN METABOLIC PANEL: CPT | Performed by: EMERGENCY MEDICINE

## 2022-09-14 PROCEDURE — 36415 COLL VENOUS BLD VENIPUNCTURE: CPT

## 2022-09-14 PROCEDURE — 84703 CHORIONIC GONADOTROPIN ASSAY: CPT

## 2022-09-14 PROCEDURE — 71250 CT THORAX DX C-: CPT

## 2022-09-14 PROCEDURE — 99284 EMERGENCY DEPT VISIT MOD MDM: CPT

## 2022-09-14 PROCEDURE — 93010 ELECTROCARDIOGRAM REPORT: CPT | Performed by: INTERNAL MEDICINE

## 2022-09-14 PROCEDURE — 83690 ASSAY OF LIPASE: CPT | Performed by: PHYSICIAN ASSISTANT

## 2022-09-14 PROCEDURE — 84484 ASSAY OF TROPONIN QUANT: CPT

## 2022-09-14 RX ORDER — LIDOCAINE 50 MG/G
1 PATCH TOPICAL EVERY 24 HOURS
Qty: 6 EACH | Refills: 0 | Status: SHIPPED | OUTPATIENT
Start: 2022-09-14 | End: 2022-11-09

## 2022-09-14 RX ORDER — METHOCARBAMOL 750 MG/1
750 TABLET, FILM COATED ORAL ONCE
Status: COMPLETED | OUTPATIENT
Start: 2022-09-14 | End: 2022-09-14

## 2022-09-14 RX ORDER — SODIUM CHLORIDE 0.9 % (FLUSH) 0.9 %
10 SYRINGE (ML) INJECTION AS NEEDED
Status: DISCONTINUED | OUTPATIENT
Start: 2022-09-14 | End: 2022-09-14 | Stop reason: HOSPADM

## 2022-09-14 RX ORDER — METHOCARBAMOL 750 MG/1
750 TABLET, FILM COATED ORAL 3 TIMES DAILY PRN
Qty: 15 TABLET | Refills: 0 | Status: SHIPPED | OUTPATIENT
Start: 2022-09-14 | End: 2022-11-09

## 2022-09-14 RX ADMIN — METHOCARBAMOL TABLETS 750 MG: 750 TABLET, COATED ORAL at 13:38

## 2022-09-14 NOTE — DISCHARGE INSTRUCTIONS
Please follow-up with your PCP.    Rest.  Increase fluid intake.  Ice as needed.     Although you are being discharged in the ED today, I encouraged her to return for worsening symptoms.  Things can, and do, change such a treatment at home with medication may not be adequate.  Specifically I recommend returning for worsening chest pain or discomfort, difficulty breathing, severe abdominal pain, persistent vomiting or difficulty holding down liquids or medications, fever > 102.0 F or any other worsening or alarming symptoms.     Take meds as prescribed.     You have been evaluated in the emergency department for your presenting symptoms and deemed appropriate for discharge home.  Understand that your health care does not end here today.  It is important that your primary care physician be made aware of your visit.  I recommend calling your primary care provider in the next 48 hours to arrange follow-up care.  Your primary care provider needs to review your treatment and recovery to ensure that no further treatment is necessary.  Additional testing or procedures may be necessary as an outpatient at the discretion of your primary care provider.    I also recommend following up with your PCP for recheck of your blood pressure and treatment as needed.
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
## 2022-09-15 ENCOUNTER — OFFICE VISIT (OUTPATIENT)
Dept: FAMILY MEDICINE CLINIC | Facility: CLINIC | Age: 37
End: 2022-09-15

## 2022-09-15 VITALS
RESPIRATION RATE: 16 BRPM | OXYGEN SATURATION: 100 % | TEMPERATURE: 97.1 F | HEART RATE: 56 BPM | WEIGHT: 176.8 LBS | HEIGHT: 67 IN | BODY MASS INDEX: 27.75 KG/M2 | DIASTOLIC BLOOD PRESSURE: 82 MMHG | SYSTOLIC BLOOD PRESSURE: 116 MMHG

## 2022-09-15 DIAGNOSIS — I26.93 SINGLE SUBSEGMENTAL PULMONARY EMBOLISM WITHOUT ACUTE COR PULMONALE: Primary | ICD-10-CM

## 2022-09-15 PROCEDURE — 99211 OFF/OP EST MAY X REQ PHY/QHP: CPT | Performed by: INTERNAL MEDICINE

## 2022-09-15 NOTE — PROGRESS NOTES
"Chief Complaint  Rapid Heart Rate, Pulmonary Embolism (Found on 09/05/22, on left side in a lot of pain), and Shortness of Breath    Subjective        Liya Carlson presents to [facility]  History of Present Illness Current outpatient and discharge medications have been reconciled for the patient.  Reviewed by: Servando Gao MD9/8/2022-9/12/2002 patient was seen for [facility].  Patient is still short of breath and is taking apixaban 5 mg twice daily.    Using dragon to dictate      Objective   Vital Signs:  Blood Pressure 116/82 (BP Location: Left arm, Patient Position: Sitting, Cuff Size: Adult)   Pulse 56   Temperature 97.1 °F (36.2 °C)   Respiration 16   Height 170.2 cm (67\")   Weight 80.2 kg (176 lb 12.8 oz)   Oxygen Saturation 100%   Body Mass Index 27.69 kg/m²   Estimated body mass index is 27.69 kg/m² as calculated from the following:    Height as of this encounter: 170.2 cm (67\").    Weight as of this encounter: 80.2 kg (176 lb 12.8 oz).          Physical Exam  Vitals and nursing note reviewed.   Constitutional:       Appearance: Normal appearance. She is well-developed.   HENT:      Head: Normocephalic and atraumatic.      Nose: Nose normal.      Mouth/Throat:      Mouth: Mucous membranes are moist.      Pharynx: Oropharynx is clear.   Eyes:      Extraocular Movements: Extraocular movements intact.      Conjunctiva/sclera: Conjunctivae normal.      Pupils: Pupils are equal, round, and reactive to light.   Cardiovascular:      Rate and Rhythm: Normal rate and regular rhythm.      Heart sounds: Normal heart sounds. No murmur heard.    No friction rub. No gallop.   Pulmonary:      Effort: Pulmonary effort is normal. No respiratory distress.      Breath sounds: Normal breath sounds. No stridor. No wheezing, rhonchi or rales.   Chest:      Chest wall: No tenderness.   Abdominal:      General: Bowel sounds are normal.      Palpations: Abdomen is soft.   Musculoskeletal: "         General: Normal range of motion.      Cervical back: Normal range of motion and neck supple.   Skin:     General: Skin is warm and dry.   Neurological:      General: No focal deficit present.      Mental Status: She is alert and oriented to person, place, and time. Mental status is at baseline.   Psychiatric:         Mood and Affect: Mood normal.         Behavior: Behavior normal.         Thought Content: Thought content normal.         Judgment: Judgment normal.        Result Review :                Assessment and Plan  #1 [facility] forms  There are no diagnoses linked to this encounter.         Follow Up   Return in about 4 weeks (around 10/13/2022), or if symptoms worsen or fail to improve.  Patient was given instructions and counseling regarding her condition or for health maintenance advice. Please see specific information pulled into the AVS if appropriate.

## 2022-09-20 DIAGNOSIS — Z00.00 PHYSICAL EXAM, ANNUAL: ICD-10-CM

## 2022-09-20 DIAGNOSIS — Z00.00 ENCOUNTER FOR GENERAL ADULT MEDICAL EXAMINATION WITHOUT ABNORMAL FINDINGS: ICD-10-CM

## 2022-09-20 RX ORDER — ZOLPIDEM TARTRATE 10 MG/1
TABLET ORAL
Qty: 90 TABLET | Refills: 3 | Status: SHIPPED | OUTPATIENT
Start: 2022-09-20 | End: 2023-03-13 | Stop reason: SDUPTHER

## 2022-09-20 RX ORDER — ONDANSETRON 4 MG/1
4 TABLET, FILM COATED ORAL EVERY 8 HOURS PRN
Qty: 30 TABLET | Refills: 3 | Status: SHIPPED | OUTPATIENT
Start: 2022-09-20

## 2022-09-20 RX ORDER — RIZATRIPTAN BENZOATE 5 MG/1
10 TABLET ORAL ONCE AS NEEDED
Qty: 12 TABLET | Refills: 0 | Status: SHIPPED | OUTPATIENT
Start: 2022-09-20 | End: 2022-12-01

## 2022-09-20 RX ORDER — CARVEDILOL 3.12 MG/1
3.12 TABLET ORAL 2 TIMES DAILY WITH MEALS
Qty: 180 TABLET | Refills: 3 | Status: SHIPPED | OUTPATIENT
Start: 2022-09-20

## 2022-09-20 RX ORDER — BUSPIRONE HYDROCHLORIDE 10 MG/1
10 TABLET ORAL 2 TIMES DAILY
Qty: 180 TABLET | Refills: 3 | Status: SHIPPED | OUTPATIENT
Start: 2022-09-20

## 2022-09-20 RX ORDER — HYDROXYZINE HYDROCHLORIDE 25 MG/1
25 TABLET, FILM COATED ORAL EVERY 8 HOURS PRN
Qty: 90 TABLET | Refills: 3 | Status: SHIPPED | OUTPATIENT
Start: 2022-09-20

## 2022-09-20 RX ORDER — FLUTICASONE PROPIONATE 50 MCG
2 SPRAY, SUSPENSION (ML) NASAL DAILY
Qty: 18.2 ML | Refills: 3 | Status: SHIPPED | OUTPATIENT
Start: 2022-09-20 | End: 2023-03-02

## 2022-09-20 RX ORDER — PANTOPRAZOLE SODIUM 40 MG/1
40 TABLET, DELAYED RELEASE ORAL DAILY
Qty: 90 TABLET | Refills: 3 | Status: SHIPPED | OUTPATIENT
Start: 2022-09-20

## 2022-09-20 RX ORDER — PROMETHAZINE HYDROCHLORIDE 25 MG/1
25 TABLET ORAL EVERY 8 HOURS PRN
Qty: 30 TABLET | Refills: 3 | Status: SHIPPED | OUTPATIENT
Start: 2022-09-20

## 2022-10-04 ENCOUNTER — TELEPHONE (OUTPATIENT)
Dept: FAMILY MEDICINE CLINIC | Facility: CLINIC | Age: 37
End: 2022-10-04

## 2022-10-04 NOTE — TELEPHONE ENCOUNTER
Caller: Liya Carlson    Relationship: Self    Best call back number:     What form or medical record are you requesting: RETURN TO WORK     Who is requesting this form or medical record from you:     How would you like to receive the form or medical records (pick-up, mail, fax):   If fax, what is the fax number:   If mail, what is the address:   If pick-up, provide patient with address and location details    Timeframe paperwork needed:     Additional notes: PATIENT IS CALLING IN STATING THAT SHE DROPPED OFF HER RETURN TO WORK FORM TO THE OFFICE 4 DAYS AGO AND IS READY TO PICK THIS UP AS SHE IS RETURNING TO WORK TOMORROW.  SHE SAYS THAT THE FORMS JUST NEED TO BE SIGNED BY DR JI.  SHE NEEDS TO PICK THESE UP TODAY. SHE WANTS TO BE CALLED ONCE THEY ARE READY.

## 2022-10-06 RX ORDER — TRAMADOL HYDROCHLORIDE 50 MG/1
50 TABLET ORAL EVERY 8 HOURS PRN
Qty: 90 TABLET | Refills: 3 | Status: SHIPPED | OUTPATIENT
Start: 2022-10-06

## 2022-10-07 PROBLEM — M62.838 MUSCLE SPASMS OF LOWER EXTREMITY: Status: ACTIVE | Noted: 2022-10-07

## 2022-10-07 PROBLEM — N92.0 MENORRHAGIA WITH REGULAR CYCLE: Status: ACTIVE | Noted: 2018-05-08

## 2022-10-13 ENCOUNTER — OFFICE VISIT (OUTPATIENT)
Dept: GASTROENTEROLOGY | Facility: CLINIC | Age: 37
End: 2022-10-13

## 2022-10-13 VITALS
SYSTOLIC BLOOD PRESSURE: 120 MMHG | DIASTOLIC BLOOD PRESSURE: 86 MMHG | HEART RATE: 98 BPM | BODY MASS INDEX: 28.5 KG/M2 | WEIGHT: 181.6 LBS | TEMPERATURE: 96 F | HEIGHT: 67 IN

## 2022-10-13 DIAGNOSIS — K59.04 CHRONIC IDIOPATHIC CONSTIPATION: ICD-10-CM

## 2022-10-13 DIAGNOSIS — R93.3 ABNORMAL CT SCAN, SIGMOID COLON: ICD-10-CM

## 2022-10-13 DIAGNOSIS — R10.32 LLQ PAIN: Primary | ICD-10-CM

## 2022-10-13 DIAGNOSIS — I26.93 SINGLE SUBSEGMENTAL PULMONARY EMBOLISM WITHOUT ACUTE COR PULMONALE: ICD-10-CM

## 2022-10-13 PROCEDURE — 99214 OFFICE O/P EST MOD 30 MIN: CPT | Performed by: PHYSICIAN ASSISTANT

## 2022-10-13 NOTE — PROGRESS NOTES
"Chief Complaint  Abdominal Pain, Nausea, and Constipation    Subjective          History of Present Illness    Liya Carlson is a  37 y.o. female presents for hospital follow-up consulted for left lower quadrant pain and abnormal CT scan of the sigmoid colon.  She was diagnosed with a pulmonary embolism and started on Eliquis while hospitalized.  She was consulted by Dr. Kerr.  She is new to me.    She underwent flexible sigmoidoscopy to further evaluate short segment of focal stenosis of the sigmoid colon which may have been underdistention.  Flexible sigmoidoscopy was unremarkable.    She also had elevated transaminases while hospitalized which improved throughout the hospital stay.  At discharge ALT was slightly elevated at 43, otherwise unremarkable LFTs.    Today, she reports persistent LLQ pain radiating from back. Associated with nausea. Pain occurring daily. Improved with hot bath. No change with BM or eating. +nocturnal pain.  She is chronically constipated and has a bowel movement twice a week. Normal stools when she has them. She takes miralax 1 dose/day which does not seem to help.    She is on eliquis for PE and this will be lifelong treatment per patient as they do not know the source. Following with hematology for this.     9/11/2022 flex sig was unremarkable.   9/14/2022 noncontrasted CT scan showed colonic diverticuli with short segment of focal stenosis of the sigmoid colon which may be due to incomplete distention.    She reports a family history of colon polyps.  Also states that several members have had colectomy and thinks this might of been due to colon cancer    Objective   Vital Signs:   /86   Pulse 98   Temp 96 °F (35.6 °C)   Ht 170.2 cm (67\")   Wt 82.4 kg (181 lb 9.6 oz)   BMI 28.44 kg/m²       Physical Exam  Vitals reviewed.   Constitutional:       General: She is awake. She is not in acute distress.     Appearance: Normal appearance. She is well-developed and well-groomed. "   HENT:      Head: Normocephalic.   Pulmonary:      Effort: Pulmonary effort is normal. No respiratory distress.   Abdominal:      General: Abdomen is flat. Bowel sounds are normal. There is no distension.      Palpations: Abdomen is soft. There is no hepatomegaly, splenomegaly or mass.      Tenderness: There is abdominal tenderness in the left lower quadrant. There is no guarding or rebound. Negative signs include Roper's sign.   Skin:     Coloration: Skin is not pale.   Neurological:      Mental Status: She is alert and oriented to person, place, and time.      Gait: Gait is intact.   Psychiatric:         Mood and Affect: Mood and affect normal.         Speech: Speech normal.         Behavior: Behavior is cooperative.         Judgment: Judgment normal.          Result Review :             Assessment and Plan    Diagnoses and all orders for this visit:    1. LLQ pain (Primary)  -     CT Abdomen Pelvis With Contrast    2. Chronic idiopathic constipation    3. Single subsegmental pulmonary embolism without acute cor pulmonale (HCC)    4. Abnormal CT scan, sigmoid colon  -     CT Abdomen Pelvis With Contrast    Recommend trial of peppermint as needed for the left lower quadrant pain.  Would recommend she follow-up with her PCP to investigate possible musculoskeletal source given there is no change with eating or bowel movements.  We will work on her constipation to see if this improves the pain.  She will start Linzess 72mcg QD on empty stomach--samples given.  Could consider prescription intestinal antispasmodic.    We will reassess CT scan abdomen pelvis with contrast to reevaluate the abnormal area in the left lower quadrant as well as this ongoing pain.    It was recommended that she have a full colonoscopy when she is able to hold her Eliquis for a time.  She follows up with hematology soon for the PE.      Follow Up   Return in about 4 weeks (around 11/10/2022) for Dr. perez or Pretty.    Amrit dictation  used throughout this note.     Pretty Roper PA-C

## 2022-11-01 ENCOUNTER — APPOINTMENT (OUTPATIENT)
Dept: ULTRASOUND IMAGING | Facility: HOSPITAL | Age: 37
End: 2022-11-01

## 2022-11-01 ENCOUNTER — HOSPITAL ENCOUNTER (EMERGENCY)
Facility: HOSPITAL | Age: 37
Discharge: HOME | End: 2022-11-01
Attending: EMERGENCY MEDICINE | Admitting: EMERGENCY MEDICINE

## 2022-11-01 ENCOUNTER — APPOINTMENT (OUTPATIENT)
Dept: CT IMAGING | Facility: HOSPITAL | Age: 37
End: 2022-11-01

## 2022-11-01 VITALS
TEMPERATURE: 96.9 F | HEIGHT: 67 IN | DIASTOLIC BLOOD PRESSURE: 93 MMHG | SYSTOLIC BLOOD PRESSURE: 130 MMHG | HEART RATE: 82 BPM | OXYGEN SATURATION: 100 % | RESPIRATION RATE: 18 BRPM | BODY MASS INDEX: 28.44 KG/M2

## 2022-11-01 DIAGNOSIS — N70.11 HYDROSALPINX: Primary | ICD-10-CM

## 2022-11-01 DIAGNOSIS — M54.42 ACUTE LEFT-SIDED LOW BACK PAIN WITH LEFT-SIDED SCIATICA: ICD-10-CM

## 2022-11-01 LAB
ALBUMIN SERPL-MCNC: 4.4 G/DL (ref 3.5–5.2)
ALBUMIN/GLOB SERPL: 1.6 G/DL
ALP SERPL-CCNC: 77 U/L (ref 39–117)
ALT SERPL W P-5'-P-CCNC: 36 U/L (ref 1–33)
ANION GAP SERPL CALCULATED.3IONS-SCNC: 10.4 MMOL/L (ref 5–15)
AST SERPL-CCNC: 18 U/L (ref 1–32)
BACTERIA UR QL AUTO: ABNORMAL /HPF
BASOPHILS # BLD AUTO: 0.02 10*3/MM3 (ref 0–0.2)
BASOPHILS NFR BLD AUTO: 0.3 % (ref 0–1.5)
BILIRUB SERPL-MCNC: <0.2 MG/DL (ref 0–1.2)
BILIRUB UR QL STRIP: NEGATIVE
BUN SERPL-MCNC: 11 MG/DL (ref 6–20)
BUN/CREAT SERPL: 13.9 (ref 7–25)
CALCIUM SPEC-SCNC: 9.4 MG/DL (ref 8.6–10.5)
CHLORIDE SERPL-SCNC: 105 MMOL/L (ref 98–107)
CLARITY UR: CLEAR
CO2 SERPL-SCNC: 21.6 MMOL/L (ref 22–29)
COLOR UR: YELLOW
CREAT SERPL-MCNC: 0.79 MG/DL (ref 0.57–1)
DEPRECATED RDW RBC AUTO: 43.7 FL (ref 37–54)
EGFRCR SERPLBLD CKD-EPI 2021: 97.7 ML/MIN/1.73
EOSINOPHIL # BLD AUTO: 0.13 10*3/MM3 (ref 0–0.4)
EOSINOPHIL NFR BLD AUTO: 1.9 % (ref 0.3–6.2)
ERYTHROCYTE [DISTWIDTH] IN BLOOD BY AUTOMATED COUNT: 13.3 % (ref 12.3–15.4)
GLOBULIN UR ELPH-MCNC: 2.8 GM/DL
GLUCOSE SERPL-MCNC: 123 MG/DL (ref 65–99)
GLUCOSE UR STRIP-MCNC: NEGATIVE MG/DL
HCG SERPL QL: NEGATIVE
HCT VFR BLD AUTO: 35.9 % (ref 34–46.6)
HGB BLD-MCNC: 12.1 G/DL (ref 12–15.9)
HGB UR QL STRIP.AUTO: NEGATIVE
HOLD SPECIMEN: NORMAL
HOLD SPECIMEN: NORMAL
HYALINE CASTS UR QL AUTO: ABNORMAL /LPF
IMM GRANULOCYTES # BLD AUTO: 0.05 10*3/MM3 (ref 0–0.05)
IMM GRANULOCYTES NFR BLD AUTO: 0.7 % (ref 0–0.5)
KETONES UR QL STRIP: ABNORMAL
LEUKOCYTE ESTERASE UR QL STRIP.AUTO: ABNORMAL
LIPASE SERPL-CCNC: 35 U/L (ref 13–60)
LYMPHOCYTES # BLD AUTO: 1.81 10*3/MM3 (ref 0.7–3.1)
LYMPHOCYTES NFR BLD AUTO: 26.3 % (ref 19.6–45.3)
MCH RBC QN AUTO: 30.4 PG (ref 26.6–33)
MCHC RBC AUTO-ENTMCNC: 33.7 G/DL (ref 31.5–35.7)
MCV RBC AUTO: 90.2 FL (ref 79–97)
MONOCYTES # BLD AUTO: 0.31 10*3/MM3 (ref 0.1–0.9)
MONOCYTES NFR BLD AUTO: 4.5 % (ref 5–12)
NEUTROPHILS NFR BLD AUTO: 4.57 10*3/MM3 (ref 1.7–7)
NEUTROPHILS NFR BLD AUTO: 66.3 % (ref 42.7–76)
NITRITE UR QL STRIP: NEGATIVE
NRBC BLD AUTO-RTO: 0 /100 WBC (ref 0–0.2)
PH UR STRIP.AUTO: 5.5 [PH] (ref 5–8)
PLATELET # BLD AUTO: 399 10*3/MM3 (ref 140–450)
PMV BLD AUTO: 9.7 FL (ref 6–12)
POTASSIUM SERPL-SCNC: 3.4 MMOL/L (ref 3.5–5.2)
PROT SERPL-MCNC: 7.2 G/DL (ref 6–8.5)
PROT UR QL STRIP: NEGATIVE
RBC # BLD AUTO: 3.98 10*6/MM3 (ref 3.77–5.28)
RBC # UR STRIP: ABNORMAL /HPF
REF LAB TEST METHOD: ABNORMAL
SODIUM SERPL-SCNC: 137 MMOL/L (ref 136–145)
SP GR UR STRIP: 1.03 (ref 1–1.03)
SQUAMOUS #/AREA URNS HPF: ABNORMAL /HPF
UROBILINOGEN UR QL STRIP: ABNORMAL
WBC # UR STRIP: ABNORMAL /HPF
WBC NRBC COR # BLD: 6.89 10*3/MM3 (ref 3.4–10.8)
WHOLE BLOOD HOLD COAG: NORMAL
WHOLE BLOOD HOLD SPECIMEN: NORMAL

## 2022-11-01 PROCEDURE — 25010000002 KETOROLAC TROMETHAMINE PER 15 MG: Performed by: EMERGENCY MEDICINE

## 2022-11-01 PROCEDURE — 84703 CHORIONIC GONADOTROPIN ASSAY: CPT | Performed by: PHYSICIAN ASSISTANT

## 2022-11-01 PROCEDURE — 25010000002 HYDROMORPHONE PER 4 MG: Performed by: EMERGENCY MEDICINE

## 2022-11-01 PROCEDURE — 96374 THER/PROPH/DIAG INJ IV PUSH: CPT

## 2022-11-01 PROCEDURE — 36415 COLL VENOUS BLD VENIPUNCTURE: CPT

## 2022-11-01 PROCEDURE — 76856 US EXAM PELVIC COMPLETE: CPT

## 2022-11-01 PROCEDURE — 96375 TX/PRO/DX INJ NEW DRUG ADDON: CPT

## 2022-11-01 PROCEDURE — 81001 URINALYSIS AUTO W/SCOPE: CPT

## 2022-11-01 PROCEDURE — 85025 COMPLETE CBC W/AUTO DIFF WBC: CPT

## 2022-11-01 PROCEDURE — 99284 EMERGENCY DEPT VISIT MOD MDM: CPT

## 2022-11-01 PROCEDURE — 83690 ASSAY OF LIPASE: CPT

## 2022-11-01 PROCEDURE — 25010000002 IOPAMIDOL 61 % SOLUTION: Performed by: EMERGENCY MEDICINE

## 2022-11-01 PROCEDURE — 74177 CT ABD & PELVIS W/CONTRAST: CPT

## 2022-11-01 PROCEDURE — 76830 TRANSVAGINAL US NON-OB: CPT

## 2022-11-01 PROCEDURE — 25010000002 ONDANSETRON PER 1 MG: Performed by: EMERGENCY MEDICINE

## 2022-11-01 PROCEDURE — 80053 COMPREHEN METABOLIC PANEL: CPT

## 2022-11-01 PROCEDURE — 96361 HYDRATE IV INFUSION ADD-ON: CPT

## 2022-11-01 PROCEDURE — 93976 VASCULAR STUDY: CPT

## 2022-11-01 RX ORDER — HYDROMORPHONE HYDROCHLORIDE 1 MG/ML
0.5 INJECTION, SOLUTION INTRAMUSCULAR; INTRAVENOUS; SUBCUTANEOUS ONCE
Status: COMPLETED | OUTPATIENT
Start: 2022-11-01 | End: 2022-11-01

## 2022-11-01 RX ORDER — SODIUM CHLORIDE 0.9 % (FLUSH) 0.9 %
10 SYRINGE (ML) INJECTION AS NEEDED
Status: DISCONTINUED | OUTPATIENT
Start: 2022-11-01 | End: 2022-11-01 | Stop reason: HOSPADM

## 2022-11-01 RX ORDER — ONDANSETRON 2 MG/ML
4 INJECTION INTRAMUSCULAR; INTRAVENOUS ONCE
Status: COMPLETED | OUTPATIENT
Start: 2022-11-01 | End: 2022-11-01

## 2022-11-01 RX ORDER — KETOROLAC TROMETHAMINE 15 MG/ML
15 INJECTION, SOLUTION INTRAMUSCULAR; INTRAVENOUS ONCE
Status: COMPLETED | OUTPATIENT
Start: 2022-11-01 | End: 2022-11-01

## 2022-11-01 RX ORDER — HYDROCODONE BITARTRATE AND ACETAMINOPHEN 5; 325 MG/1; MG/1
1 TABLET ORAL EVERY 4 HOURS PRN
Qty: 16 TABLET | Refills: 0 | Status: SHIPPED | OUTPATIENT
Start: 2022-11-01 | End: 2022-11-09 | Stop reason: SDUPTHER

## 2022-11-01 RX ORDER — METHYLPREDNISOLONE 4 MG/1
TABLET ORAL
Qty: 1 EACH | Refills: 0 | Status: SHIPPED | OUTPATIENT
Start: 2022-11-01 | End: 2022-11-09

## 2022-11-01 RX ADMIN — SODIUM CHLORIDE 1000 ML: 9 INJECTION, SOLUTION INTRAVENOUS at 03:57

## 2022-11-01 RX ADMIN — KETOROLAC TROMETHAMINE 15 MG: 15 INJECTION, SOLUTION INTRAMUSCULAR; INTRAVENOUS at 03:59

## 2022-11-01 RX ADMIN — HYDROMORPHONE HYDROCHLORIDE 0.5 MG: 1 INJECTION, SOLUTION INTRAMUSCULAR; INTRAVENOUS; SUBCUTANEOUS at 04:03

## 2022-11-01 RX ADMIN — ONDANSETRON 4 MG: 2 INJECTION INTRAMUSCULAR; INTRAVENOUS at 04:00

## 2022-11-01 RX ADMIN — IOPAMIDOL 85 ML: 612 INJECTION, SOLUTION INTRAVENOUS at 04:21

## 2022-11-01 NOTE — ED PROVIDER NOTES
EMERGENCY DEPARTMENT ENCOUNTER    Room Number:  02/02  Date of encounter:  11/1/2022  PCP: Servando Gao MD  Historian: Patient      HPI:  Chief Complaint: Left-sided abdominal pain, lower back pain, left leg pain.    A complete HPI/ROS/PMH/PSH/SH/FH are unobtainable due to: Nothing    Context: Liya Carlson is a 37 y.o. female who presents to the ED c/o left-sided abdominal pain, lower back pain.  Patient states she has intermittently had the symptoms ongoing for the past few months and they seem to wax and wane in severity.  The pain is gradually gotten worse over the past 4 days. The pain in the left lower quadrant as well as the lower back is said to be significant.  The pain in the lower back is made worse with movement.  Nothing makes the abdominal pain better or worse.  The pain in the back is again radiating to the left lower extremity and toward the foot.    Patient informs that she was recently diagnosed with a DVT/PE.  Patient was found to have factor V Leiden deficiency.  She is currently being treated with Eliquis.      PAST MEDICAL HISTORY  Active Ambulatory Problems     Diagnosis Date Noted   • Seizures (HCC)    • Pancreatitis    • Gastric ulcer    • Dyslipidemia 03/18/2016   • Environmental allergies 03/18/2016   • Intractable migraine without aura and without status migrainosus 08/08/2017   • Single subsegmental pulmonary embolism without acute cor pulmonale (HCC) 09/08/2022   • Abnormal CT scan, sigmoid colon 09/08/2022   • Menorrhagia with regular cycle 05/08/2018   • Muscle spasms of lower extremity 10/07/2022     Resolved Ambulatory Problems     Diagnosis Date Noted   • No Resolved Ambulatory Problems     Past Medical History:   Diagnosis Date   • Chest wall contusion    • Contusion, abdominal wall    • Headache, tension-type    • History of blood clots    • Migraine    • Pulmonary embolism (HCC)          PAST SURGICAL HISTORY  Past Surgical History:   Procedure Laterality Date   •  LAPAROSCOPIC TUBAL LIGATION     • SIGMOIDOSCOPY N/A 9/11/2022    Procedure: SIGMOIDOSCOPY FLEXIBLE TO DESCENDING COLON;  Surgeon: Sierra Kerr MD;  Location: [facility] ENDOSCOPY;  Service: Gastroenterology;  Laterality: N/A;  PRE- ABDOMINAL PAIN, ABNORMAL CT  POST- SMALL HEMORRHOIDS         FAMILY HISTORY  Family History   Problem Relation Age of Onset   • Diabetes Mother    • Hypertension Mother    • Migraines Mother    • Dementia Mother    • Arthritis Mother    • Kidney disease Mother    • Colon polyps Father    • Hypertension Father    • Diabetes Father    • Seizures Father    • Stroke Father    • Colon cancer Paternal Aunt    • Colon polyps Paternal Aunt    • Colon cancer Paternal Grandmother    • Colon polyps Paternal Grandmother          SOCIAL HISTORY  Social History     Socioeconomic History   • Marital status:    Tobacco Use   • Smoking status: Never   • Smokeless tobacco: Never   Vaping Use   • Vaping Use: Never used   Substance and Sexual Activity   • Alcohol use: Yes     Comment: OCCASIONAL   • Drug use: No   • Sexual activity: Defer         ALLERGIES  Patient has no known allergies.        REVIEW OF SYSTEMS  Review of Systems   Constitutional: Negative for chills and fever.   HENT: Negative.    Eyes: Negative.    Respiratory: Negative for shortness of breath and stridor.    Cardiovascular: Negative for chest pain and palpitations.   Gastrointestinal: Positive for abdominal pain and nausea. Negative for blood in stool, constipation, diarrhea and vomiting.   Genitourinary: Negative.    Musculoskeletal: Positive for back pain.   Skin: Negative.    Neurological: Negative.    Psychiatric/Behavioral: Negative.         All systems reviewed and negative except for those discussed in HPI.       PHYSICAL EXAM    I have reviewed the triage vital signs and nursing notes.    ED Triage Vitals [11/01/22 0031]   Temp Heart Rate Resp BP SpO2   96.9 °F (36.1 °C) 117 18 123/83 99 %      Temp src Heart Rate Source  Patient Position BP Location FiO2 (%)   -- -- -- -- --       Physical Exam  GENERAL: WDWN female, nontoxic, does appear slightly comfortable   HENT: nares patent  EYES: no scleral icterus  CV: regular rhythm, regular rate  RESPIRATORY: normal effort  ABDOMEN: TTP left lower quadrant.    MUSCULOSKELETAL: Lower back: TTP along the left SI joint.  Strength 5-5 bilateral upper and lower extremities.  NEURO: alert, moves all extremities, follows commands, gross sensation intact globally.  SKIN: warm, dry        LAB RESULTS  Recent Results (from the past 24 hour(s))   Comprehensive Metabolic Panel    Collection Time: 11/01/22  1:24 AM    Specimen: Arm, Left; Blood   Result Value Ref Range    Glucose 123 (H) 65 - 99 mg/dL    BUN 11 6 - 20 mg/dL    Creatinine 0.79 0.57 - 1.00 mg/dL    Sodium 137 136 - 145 mmol/L    Potassium 3.4 (L) 3.5 - 5.2 mmol/L    Chloride 105 98 - 107 mmol/L    CO2 21.6 (L) 22.0 - 29.0 mmol/L    Calcium 9.4 8.6 - 10.5 mg/dL    Total Protein 7.2 6.0 - 8.5 g/dL    Albumin 4.40 3.50 - 5.20 g/dL    ALT (SGPT) 36 (H) 1 - 33 U/L    AST (SGOT) 18 1 - 32 U/L    Alkaline Phosphatase 77 39 - 117 U/L    Total Bilirubin <0.2 0.0 - 1.2 mg/dL    Globulin 2.8 gm/dL    A/G Ratio 1.6 g/dL    BUN/Creatinine Ratio 13.9 7.0 - 25.0    Anion Gap 10.4 5.0 - 15.0 mmol/L    eGFR 97.7 >60.0 mL/min/1.73   Lipase    Collection Time: 11/01/22  1:24 AM    Specimen: Arm, Left; Blood   Result Value Ref Range    Lipase 35 13 - 60 U/L   Green Top (Gel)    Collection Time: 11/01/22  1:24 AM   Result Value Ref Range    Extra Tube Hold for add-ons.    Lavender Top    Collection Time: 11/01/22  1:24 AM   Result Value Ref Range    Extra Tube hold for add-on    Gold Top - SST    Collection Time: 11/01/22  1:24 AM   Result Value Ref Range    Extra Tube Hold for add-ons.    Light Blue Top    Collection Time: 11/01/22  1:24 AM   Result Value Ref Range    Extra Tube Hold for add-ons.    CBC Auto Differential    Collection Time: 11/01/22  1:24  AM    Specimen: Arm, Left; Blood   Result Value Ref Range    WBC 6.89 3.40 - 10.80 10*3/mm3    RBC 3.98 3.77 - 5.28 10*6/mm3    Hemoglobin 12.1 12.0 - 15.9 g/dL    Hematocrit 35.9 34.0 - 46.6 %    MCV 90.2 79.0 - 97.0 fL    MCH 30.4 26.6 - 33.0 pg    MCHC 33.7 31.5 - 35.7 g/dL    RDW 13.3 12.3 - 15.4 %    RDW-SD 43.7 37.0 - 54.0 fl    MPV 9.7 6.0 - 12.0 fL    Platelets 399 140 - 450 10*3/mm3    Neutrophil % 66.3 42.7 - 76.0 %    Lymphocyte % 26.3 19.6 - 45.3 %    Monocyte % 4.5 (L) 5.0 - 12.0 %    Eosinophil % 1.9 0.3 - 6.2 %    Basophil % 0.3 0.0 - 1.5 %    Immature Grans % 0.7 (H) 0.0 - 0.5 %    Neutrophils, Absolute 4.57 1.70 - 7.00 10*3/mm3    Lymphocytes, Absolute 1.81 0.70 - 3.10 10*3/mm3    Monocytes, Absolute 0.31 0.10 - 0.90 10*3/mm3    Eosinophils, Absolute 0.13 0.00 - 0.40 10*3/mm3    Basophils, Absolute 0.02 0.00 - 0.20 10*3/mm3    Immature Grans, Absolute 0.05 0.00 - 0.05 10*3/mm3    nRBC 0.0 0.0 - 0.2 /100 WBC   hCG, Serum, Qualitative    Collection Time: 11/01/22  1:24 AM    Specimen: Arm, Left; Blood   Result Value Ref Range    HCG Qualitative Negative Negative   Urinalysis With Microscopic If Indicated (No Culture) - Urine, Clean Catch    Collection Time: 11/01/22  1:27 AM    Specimen: Urine, Clean Catch   Result Value Ref Range    Color, UA Yellow Yellow, Straw    Appearance, UA Clear Clear    pH, UA 5.5 5.0 - 8.0    Specific Gravity, UA 1.029 1.005 - 1.030    Glucose, UA Negative Negative    Ketones, UA Trace (A) Negative    Bilirubin, UA Negative Negative    Blood, UA Negative Negative    Protein, UA Negative Negative    Leuk Esterase, UA Trace (A) Negative    Nitrite, UA Negative Negative    Urobilinogen, UA 1.0 E.U./dL 0.2 - 1.0 E.U./dL   Urinalysis, Microscopic Only - Urine, Clean Catch    Collection Time: 11/01/22  1:27 AM    Specimen: Urine, Clean Catch   Result Value Ref Range    RBC, UA 0-2 None Seen, 0-2 /HPF    WBC, UA 6-12 (A) None Seen, 0-2 /HPF    Bacteria, UA None Seen None Seen /HPF     Squamous Epithelial Cells, UA 3-6 (A) None Seen, 0-2 /HPF    Hyaline Casts, UA 3-6 None Seen /LPF    Methodology Automated Microscopy        Ordered the above labs and independently reviewed the results.        RADIOLOGY  US Pelvis Complete    Result Date: 11/1/2022  PELVIC ULTRASOUND  HISTORY: Abnormal CT  COMPARISON: 11/01/2022  TECHNIQUE: Grayscale, color Doppler, spectral Doppler waveform analysis was performed through the pelvis transabdominally and transvaginally.  FINDINGS: Uterus measures 8.6 x 4.4 x 5.4 cm. It is retroverted. Multiple nabothian cysts are noted through the cervix. Sonographer is measured a hypoechoic area within the uterus a possible portion of the endometrium. This likely corresponds to the area seen on earlier CT. It measures 1.3 x 2.6 cm. I'm uncertain if this represents a uterine fibroid, or an unusual configuration to the endometrium, related to prior ablation. MRI could be considered for further assessment, particularly if the patient has any complaints of dysfunctional uterine bleeding. The right ovary measures 2.5 x 1.5 x 2.2 cm. Left ovary measures 3.2 x 2.0 x 1.8 cm. Normal color Doppler flow is seen within both ovaries. The patient has a dominant follicle on the left ovary measuring 1.5 x 1.66 x 1.8 cm. Within the left adnexal area, there is a tubular structure, which is suspicious for hydrosalpinx. I see no convincing evidence of tubo-ovarian abscess.       1. Hypoechoic area within the uterus corresponds to a low-attenuation area seen on this earlier CT. The sonographer has measured it as a portion of the endometrium, this may represent an abnormal configuration of the endometrium following endometrial ablation. Uterine fibroid would be another consideration. MRI could be considered for further assessment, particularly if the patient has dysfunctional uterine bleeding. 2. Left hydrosalpinx, without evidence of tubo-ovarian abscess. 3. No evidence of torsion.  This report was  finalized on 11/1/2022 6:02 AM by Dr. Rhina Austin M.D.      CT Abdomen Pelvis With Contrast    Result Date: 11/1/2022  CT OF THE ABDOMEN AND PELVIS WITH CONTRAST  HISTORY: Left lower quadrant pain  COMPARISON: 09/14/2022  TECHNIQUE: Axial CT imaging was obtained through the abdomen and pelvis. IV contrast was administered.  FINDINGS: Images through the lung bases are clear. No suspicious hepatic lesions are seen. The stomach, duodenum, adrenal glands, spleen, pancreas, and gallbladder all appear normal. The kidneys enhance symmetrically. There is no hydronephrosis. No distal ureteral or bladder stones are seen. Uterus is retroflexed. There is a low-attenuation area identified within the fundus which may represent a nonenhancing uterine fibroid. Pelvic ultrasound would be more sensitive for assessment. There is a tubular structure which is noted within the left hemipelvis, which may represent hydrosalpinx. No definite suspicious masses are seen on the left ovary. Complex cyst is suspected on the right ovary. Again, this would be better assessed with pelvic ultrasound. There is no bowel obstruction. Patient has a known area of relative narrowing involving the proximal sigmoid colon. This appearance is stable when compared to prior exam. As was previously discussed, consideration for colonoscopy suggested. The appendix is normal. No acute osseous abnormalities are seen. There is a small fat-containing umbilical hernia       1. The patient has a low-attenuation area identified within the uterine fundus, which may represent a uterine fibroid, but is incompletely characterized on this study. There is also a tubular structure within the left hemipelvis which may represent hydrosalpinx, as well as a possible complex right ovarian cyst. Further assessment with pelvic ultrasound is recommended. 2. Previously described area of relative narrowing of the proximal sigmoid colon, stable when compared to prior imaging. As was  previously discussed, consideration for colonoscopy is suggested, given its persistence.  Radiation dose reduction techniques were utilized, including automated exposure control and exposure modulation based on body size.  This report was finalized on 11/1/2022 4:58 AM by Dr. Rhina Austin M.D.        I ordered the above noted radiological studies. Reviewed by me and discussed with radiologist.  See dictation for official radiology interpretation.      PROCEDURES    Procedures      MEDICATIONS GIVEN IN ER    Medications   sodium chloride 0.9 % flush 10 mL (has no administration in time range)   ketorolac (TORADOL) injection 15 mg (15 mg Intravenous Given 11/1/22 0359)   HYDROmorphone (DILAUDID) injection 0.5 mg (0.5 mg Intravenous Given 11/1/22 0403)   ondansetron (ZOFRAN) injection 4 mg (4 mg Intravenous Given 11/1/22 0400)   sodium chloride 0.9 % bolus 1,000 mL (0 mL Intravenous Stopped 11/1/22 0545)   iopamidol (ISOVUE-300) 61 % injection 100 mL (85 mL Intravenous Given 11/1/22 0421)         PROGRESS, DATA ANALYSIS, CONSULTS, AND MEDICAL DECISION MAKING    All labs have been independently reviewed by me.  All radiology studies have been reviewed by me and discussed with radiologist dictating the report.   EKG's independently viewed and interpreted by me.  Discussion below represents my analysis of pertinent findings related to patient's condition, differential diagnosis, treatment plan and final disposition.    DDx: DDD/stenosis with sciatica, HNP with sciatica, less likely discitis or abscess with sciatica.  For the abdomen: Ovarian cyst, ectopic pregnancy, tubo-ovarian abscess, ureteral stone, diverticulitis.  We will begin work-up by obtaining CBC, CMP, lipase, serum hCG, CT abdomen pelvis with IV contrast    ED Course as of 11/01/22 0618   Tue Nov 01, 2022   0306 WBC: 6.89 [RC]   0307 RBC: 3.98 [RC]   0307 Hemoglobin: 12.1 [RC]   0307 Hematocrit: 35.9 [RC]   0307 Platelets: 399 [RC]   0307 Glucose(!): 123  [RC]   0307 BUN: 11 [RC]   0307 Creatinine: 0.79 [RC]   0307 Sodium: 137 [RC]   0307 Potassium(!): 3.4 [RC]   0307 CO2(!): 21.6 [RC]   0307 Anion Gap: 10.4 [RC]   0307 Bacteria, UA: None Seen [RC]   0307 WBC, UA(!): 6-12 [RC]   0307 RBC, UA: 0-2 [RC]   0601 HCG Qualitative: Negative [RC]   0610 Patient again denies fever, chills, vaginal discharge, recent unprotected intercourse.  She states her left-sided pelvic pain is been intermittent and ongoing for several weeks.  Plan to treat the patient conservatively with a short course of pain medication.  We will have her to follow-up with her GYN for further evaluation and management of the left-sided hydrosalpinx. [RC]   0613 I also do feel the patient is having some left-sided sciatica.  Her lower back pain radiates posterior down the left lower extremity to just past the knee.  We will place the patient on a short course of Medrol as well.  We will have her to follow-up with her family physician for further management of her back pain. [RC]      ED Course User Index  [RC] Maxime Kowalski III, PA           PPE: The patient wore a surgical mask throughout the entire patient encounter. I wore an N95.    AS OF 06:18 EDT VITALS:    BP - 120/70  HR - 87  TEMP - 96.9 °F (36.1 °C)  O2 SATS - 92%        DIAGNOSIS  Final diagnoses:   Hydrosalpinx-left-sided   Acute left-sided low back pain with left-sided sciatica         DISPOSITION  DISCHARGE    Patient discharged in stable condition.    Reviewed implications of results, diagnosis, meds, responsibility to follow up, warning signs and symptoms of possible worsening, potential complications and reasons to return to ER.    Patient/Family voiced understanding of above instructions.    Discussed plan for discharge, as there is no emergent indication for admission. Patient referred to primary care provider for BP management due to today's BP. Pt/family is agreeable and understands need for follow up and repeat testing.  Pt  is aware that discharge does not mean that nothing is wrong but it indicates no emergency is present that requires admission and they must continue care with follow-up as given below or physician of their choice.     FOLLOW-UP  David Melchor MD  [address] [facility]  [address]  [facility] [id number] [phone]    Schedule an appointment as soon as possible for a visit   For further evaluation and treatment of the left-sided hydrosalpinx    Servando Gao MD  [address] [facility] [id number] [phone]    Schedule an appointment as soon as possible for a visit   For further evaluation and treatment of all other issues         Medication List      New Prescriptions    methylPREDNISolone 4 MG dose pack  Commonly known as: MEDROL  Take as directed on package instructions.        Changed    topiramate 50 MG tablet  Commonly known as: TOPAMAX  Take 1 tablet by mouth Every Night.  What changed:   · when to take this  · additional instructions           Where to Get Your Medications      [facility] [address] [facility] - [phone]  - [phone]   [address] [facility] [address]    Phone: [phone]   · methylPREDNISolone 4 MG dose pack                Maxime Kowalski III, PA  11/01/22 0618

## 2022-11-01 NOTE — ED TRIAGE NOTES
"Pt to ER from home via PV with c/o L sided abd pain, low back pain and L leg pain. Pt states pain has been ongoing for \"a while\" but usually waxes and wanes in severity. However, pt states that pain has increased in severity over the last 4 days with no relief. Pt reports numbness and tingling to LLE since the back pain began and reports some nausea associated with pain. Pt ambulatory without difficulty to triage.     Pt states she was recently dx with a PE.      Pt masked in triage, staff in appropriate ppe.    "

## 2022-11-01 NOTE — ED PROVIDER NOTES
MD ATTESTATION NOTE    The EPI and I have discussed this patient's history, physical exam, and treatment plan.  I have reviewed the documentation and personally had a face to face interaction with the patient. I affirm the documentation and agree with the treatment and plan.  The attached note describes my personal findings.      I provided a substantive portion of the care of the patient.  I personally performed the physical exam in its entirety, and below are my findings.  For this patient encounter, the patient wore surgical mask, I wore full protective PPE including N95 and eye protection.      Brief HPI: This patient is a 37-year-old female presenting to the emergency room today with left lower abdominal discomfort as well as low back discomfort.  She denies lower extremity weakness, urinary retention, groin numbness, or fecal incontinence.    PHYSICAL EXAM  ED Triage Vitals [11/01/22 0031]   Temp Heart Rate Resp BP SpO2   96.9 °F (36.1 °C) 117 18 123/83 99 %      Temp src Heart Rate Source Patient Position BP Location FiO2 (%)   -- -- -- -- --         GENERAL: In mild distress secondary to discomfort, nontoxic in appearance  HENT: nares patent  EYES: no scleral icterus  CV: regular rhythm, normal rate, no M/R/G  RESPIRATORY: normal effort, lungs clear bilaterally  ABDOMEN: soft, mild tenderness to palpation present to the left lower quadrant/left pelvic region, no rebound or guarding  MUSCULOSKELETAL: no deformity, no edema  NEURO: alert, moves all extremities, follows commands  PSYCH:  calm, cooperative  SKIN: warm, dry    Vital signs and nursing notes reviewed.        Plan: We will obtain labs, urinalysis, CT scan of the abdomen and pelvis, as well as a pelvic/transvaginal ultrasound for full assessment.  Will monitor and reassess following       Erik Chakraborty MD  11/01/22 0621

## 2022-11-06 DIAGNOSIS — Z00.00 PHYSICAL EXAM, ANNUAL: ICD-10-CM

## 2022-11-07 RX ORDER — TIZANIDINE 4 MG/1
4 TABLET ORAL EVERY 8 HOURS PRN
Qty: 90 TABLET | Refills: 4 | Status: SHIPPED | OUTPATIENT
Start: 2022-11-07 | End: 2023-03-13 | Stop reason: SDUPTHER

## 2022-11-09 ENCOUNTER — OFFICE VISIT (OUTPATIENT)
Dept: FAMILY MEDICINE CLINIC | Facility: CLINIC | Age: 37
End: 2022-11-09

## 2022-11-09 VITALS
HEART RATE: 94 BPM | DIASTOLIC BLOOD PRESSURE: 72 MMHG | TEMPERATURE: 98.2 F | SYSTOLIC BLOOD PRESSURE: 128 MMHG | HEIGHT: 67 IN | WEIGHT: 185 LBS | BODY MASS INDEX: 29.03 KG/M2 | OXYGEN SATURATION: 97 %

## 2022-11-09 DIAGNOSIS — R30.0 DYSURIA: ICD-10-CM

## 2022-11-09 DIAGNOSIS — N89.8 DISCHARGE FROM THE VAGINA: ICD-10-CM

## 2022-11-09 DIAGNOSIS — N70.11 HYDROSALPINX: ICD-10-CM

## 2022-11-09 DIAGNOSIS — R10.2 PELVIC PAIN: Primary | ICD-10-CM

## 2022-11-09 DIAGNOSIS — D21.9 FIBROID: ICD-10-CM

## 2022-11-09 LAB
BACTERIA UR QL AUTO: NORMAL /HPF
BILIRUB UR QL STRIP: NEGATIVE
CLARITY UR: CLEAR
CLUE CELLS SPEC QL WET PREP: ABNORMAL
COLOR UR: YELLOW
ERYTHROCYTE [DISTWIDTH] IN BLOOD BY AUTOMATED COUNT: 13 % (ref 12.3–15.4)
GLUCOSE UR STRIP-MCNC: NEGATIVE MG/DL
HCT VFR BLD AUTO: 41.3 % (ref 34–46.6)
HGB BLD-MCNC: 14.3 G/DL (ref 12–15.9)
HGB UR QL STRIP.AUTO: NEGATIVE
HIV1+2 AB SER QL: NORMAL
HYALINE CASTS UR QL AUTO: NORMAL /LPF
HYDATID CYST SPEC WET PREP: ABNORMAL
KETONES UR QL STRIP: NEGATIVE
LEUKOCYTE ESTERASE UR QL STRIP.AUTO: NEGATIVE
LYMPHOCYTES # BLD AUTO: 2.5 10*3/MM3 (ref 0.7–3.1)
LYMPHOCYTES NFR BLD AUTO: 30.7 % (ref 19.6–45.3)
MCH RBC QN AUTO: 30.7 PG (ref 26.6–33)
MCHC RBC AUTO-ENTMCNC: 34.8 G/DL (ref 31.5–35.7)
MCV RBC AUTO: 88.4 FL (ref 79–97)
MONOCYTES # BLD AUTO: 0.4 10*3/MM3 (ref 0.1–0.9)
MONOCYTES NFR BLD AUTO: 5.1 % (ref 5–12)
NEUTROPHILS NFR BLD AUTO: 5.3 10*3/MM3 (ref 1.7–7)
NEUTROPHILS NFR BLD AUTO: 64.2 % (ref 42.7–76)
NITRITE UR QL STRIP: NEGATIVE
PH UR STRIP.AUTO: 6.5 [PH] (ref 4.6–8)
PLATELET # BLD AUTO: 444 10*3/MM3 (ref 140–450)
PMV BLD AUTO: 7.4 FL (ref 6–12)
PROT UR QL STRIP: NEGATIVE
RBC # BLD AUTO: 4.67 10*6/MM3 (ref 3.77–5.28)
RBC # UR STRIP: NORMAL /HPF
REF LAB TEST METHOD: NORMAL
RPR SER QL: NORMAL
SP GR UR STRIP: <=1.005 (ref 1–1.03)
SQUAMOUS #/AREA URNS HPF: NORMAL /HPF
T VAGINALIS SPEC QL WET PREP: ABNORMAL
UROBILINOGEN UR QL STRIP: NORMAL
WBC # UR STRIP: NORMAL /HPF
WBC NRBC COR # BLD: 8.3 10*3/MM3 (ref 3.4–10.8)
WBC SPEC QL WET PREP: ABNORMAL
YEAST GENITAL QL WET PREP: ABNORMAL

## 2022-11-09 PROCEDURE — 99214 OFFICE O/P EST MOD 30 MIN: CPT | Performed by: INTERNAL MEDICINE

## 2022-11-09 PROCEDURE — 86592 SYPHILIS TEST NON-TREP QUAL: CPT | Performed by: INTERNAL MEDICINE

## 2022-11-09 PROCEDURE — 81001 URINALYSIS AUTO W/SCOPE: CPT | Performed by: INTERNAL MEDICINE

## 2022-11-09 PROCEDURE — 87086 URINE CULTURE/COLONY COUNT: CPT | Performed by: INTERNAL MEDICINE

## 2022-11-09 PROCEDURE — 85025 COMPLETE CBC W/AUTO DIFF WBC: CPT | Performed by: INTERNAL MEDICINE

## 2022-11-09 PROCEDURE — 87210 SMEAR WET MOUNT SALINE/INK: CPT | Performed by: INTERNAL MEDICINE

## 2022-11-09 PROCEDURE — G0432 EIA HIV-1/HIV-2 SCREEN: HCPCS | Performed by: INTERNAL MEDICINE

## 2022-11-09 PROCEDURE — 36415 COLL VENOUS BLD VENIPUNCTURE: CPT | Performed by: INTERNAL MEDICINE

## 2022-11-09 RX ORDER — HYDROCODONE BITARTRATE AND ACETAMINOPHEN 5; 325 MG/1; MG/1
1 TABLET ORAL EVERY 4 HOURS PRN
Qty: 20 TABLET | Refills: 0 | Status: SHIPPED | OUTPATIENT
Start: 2022-11-09 | End: 2022-11-21

## 2022-11-09 RX ORDER — METRONIDAZOLE 500 MG/1
500 TABLET ORAL 2 TIMES DAILY
Qty: 14 TABLET | Refills: 0 | Status: SHIPPED | OUTPATIENT
Start: 2022-11-09

## 2022-11-09 NOTE — PROGRESS NOTES
"Chief Complaint  go over labs and last consult with gyn    Subjective        Liya Carlson presents to [facility]  History of Present Illness patient was seen for severe pelvic pain.  Pain was located in the right lower quadrant.  Patient did have slight rebound.  Patient did have an ultrasound and CT scan which showed a hydrosalpinx and a fibroid.  Patient did show narrowing of her sigmoid colon but it was unchanged from previous exam.  Patient had been placed on Linzess and is having normal bowel movements.  Patient was put on hydrocodone in the emergency room for pain relief.  Patient did have a KOH and wet mount which did show trichomonas and she was given Flagyl 500 mg twice daily x7 days.  Patient also had an STD work-up that is pending at the time of dictation.  Patient is seeing her GYN next week.  Patient did have a white thick discharge.  Patient had a UA that was essentially negative.  Patient white count was also 8000.  Patient had a normal shift.  Pregnancy test was negative in the emergency room.    Dictated utilizing Dragon dictation. If there are questions or for further clarification, please contact me.    Objective   Vital Signs:  Blood Pressure 128/72   Pulse 94   Temperature 98.2 °F (36.8 °C)   Height 170.2 cm (67\")   Weight 83.9 kg (185 lb)   Oxygen Saturation 97%   Body Mass Index 28.98 kg/m²   Estimated body mass index is 28.98 kg/m² as calculated from the following:    Height as of this encounter: 170.2 cm (67\").    Weight as of this encounter: 83.9 kg (185 lb).          Physical Exam  Vitals and nursing note reviewed.   Constitutional:       Appearance: Normal appearance. She is well-developed.   HENT:      Head: Normocephalic and atraumatic.      Nose: Nose normal.      Mouth/Throat:      Mouth: Mucous membranes are moist.      Pharynx: Oropharynx is clear.   Eyes:      Extraocular Movements: Extraocular movements intact.      Conjunctiva/sclera: " Conjunctivae normal.      Pupils: Pupils are equal, round, and reactive to light.   Cardiovascular:      Rate and Rhythm: Normal rate and regular rhythm.      Heart sounds: Normal heart sounds. No murmur heard.    No friction rub. No gallop.   Pulmonary:      Effort: Pulmonary effort is normal. No respiratory distress.      Breath sounds: Normal breath sounds. No stridor. No wheezing, rhonchi or rales.   Chest:      Chest wall: No tenderness.   Abdominal:      General: Bowel sounds are normal.      Palpations: Abdomen is soft.   Genitourinary:     Vagina: Vaginal discharge present.      Comments: Tenderness right lower pelvic area, slight rebound  Musculoskeletal:         General: Normal range of motion.      Cervical back: Normal range of motion and neck supple.   Skin:     General: Skin is warm and dry.   Neurological:      General: No focal deficit present.      Mental Status: She is alert and oriented to person, place, and time. Mental status is at baseline.   Psychiatric:         Mood and Affect: Mood normal.         Behavior: Behavior normal.         Thought Content: Thought content normal.         Judgment: Judgment normal.        Result Review :                Assessment and Plan  for 1 STD work-up, KOH wet mount, CBC #2 Flagyl 500 mg twice daily x7 days #3 GYN referral  Diagnoses and all orders for this visit:    1. Pelvic pain (Primary)    2. Discharge from the vagina  -     Chlamydia trachomatis, Neisseria gonorrhoeae, PCR - , Urine, Clean Catch  -     RPR  -     HIV-1 / O / 2 Ag / Antibody 4th Generation; Future  -     HSV 1 Antibody, IgG  -     HSV 2 Antibody, IgG  -     HIV-1 / O / 2 Ag / Antibody 4th Generation  -     JULIEN Prep - Swab, Vagina  -     Wet Prep, Genital - Swab, Vagina; Future  -     Wet Prep, Genital - Swab, Vagina    3. Dysuria  -     CBC & Differential  -     Urinalysis With Microscopic - Urine, Clean Catch  -     Urine Culture - Urine, Urine, Clean Catch    4. Fibroid    5.  Hydrosalpinx-left-sided  -     HYDROcodone-acetaminophen (NORCO) 5-325 MG per tablet; Take 1 tablet by mouth Every 4 (Four) Hours As Needed for Moderate Pain.  Dispense: 20 tablet; Refill: 0    Other orders  -     metroNIDAZOLE (Flagyl) 500 MG tablet; Take 1 tablet by mouth 2 (Two) Times a Day.  Dispense: 14 tablet; Refill: 0             Follow Up   Return in about 2 weeks (around 11/23/2022).  Patient was given instructions and counseling regarding her condition or for health maintenance advice. Please see specific information pulled into the AVS if appropriate.

## 2022-11-10 LAB
BACTERIA SPEC AEROBE CULT: NO GROWTH
HSV1 IGG SER IA-ACNC: 41.6 INDEX (ref 0–0.9)
HSV2 IGG SER IA-ACNC: <0.91 INDEX (ref 0–0.9)

## 2022-11-11 LAB
C TRACH RRNA SPEC QL NAA+PROBE: NEGATIVE
N GONORRHOEA RRNA SPEC QL NAA+PROBE: NEGATIVE

## 2022-11-21 DIAGNOSIS — N70.11 HYDROSALPINX: ICD-10-CM

## 2022-11-21 RX ORDER — HYDROCODONE BITARTRATE AND ACETAMINOPHEN 5; 325 MG/1; MG/1
1 TABLET ORAL EVERY 4 HOURS PRN
Qty: 20 TABLET | Refills: 0 | Status: SHIPPED | OUTPATIENT
Start: 2022-11-21 | End: 2022-12-01

## 2022-11-30 DIAGNOSIS — Z00.00 PHYSICAL EXAM, ANNUAL: ICD-10-CM

## 2022-11-30 DIAGNOSIS — N70.11 HYDROSALPINX: ICD-10-CM

## 2022-12-01 RX ORDER — RIZATRIPTAN BENZOATE 5 MG/1
10 TABLET ORAL ONCE AS NEEDED
Qty: 12 TABLET | Refills: 0 | Status: SHIPPED | OUTPATIENT
Start: 2022-12-01 | End: 2023-02-15

## 2022-12-01 RX ORDER — HYDROCODONE BITARTRATE AND ACETAMINOPHEN 5; 325 MG/1; MG/1
1 TABLET ORAL EVERY 4 HOURS PRN
Qty: 20 TABLET | Refills: 0 | Status: SHIPPED | OUTPATIENT
Start: 2022-12-01 | End: 2022-12-12

## 2022-12-11 DIAGNOSIS — N70.11 HYDROSALPINX: ICD-10-CM

## 2022-12-12 RX ORDER — HYDROCODONE BITARTRATE AND ACETAMINOPHEN 5; 325 MG/1; MG/1
1 TABLET ORAL EVERY 4 HOURS PRN
Qty: 20 TABLET | Refills: 0 | Status: SHIPPED | OUTPATIENT
Start: 2022-12-12 | End: 2022-12-29

## 2022-12-28 DIAGNOSIS — N70.11 HYDROSALPINX: ICD-10-CM

## 2022-12-29 RX ORDER — HYDROCODONE BITARTRATE AND ACETAMINOPHEN 5; 325 MG/1; MG/1
1 TABLET ORAL EVERY 4 HOURS PRN
Qty: 20 TABLET | Refills: 0 | Status: SHIPPED | OUTPATIENT
Start: 2022-12-29 | End: 2023-01-10

## 2023-01-04 NOTE — ED PROVIDER NOTES
EMERGENCY DEPARTMENT ENCOUNTER    Room Number:  E568/1  Date of encounter:  9/10/2022  PCP: Servando Gao MD  Historian: Patient and family      HPI:  Chief Complaint: Chest pain, shortness of breath, feeling lightheaded  A complete HPI/ROS/PMH/PSH/SH/FH are unobtainable due to: Not applicable  Context: Liya Carlson is a 37 y.o. female who presents to the ED c/o patient started having some chest pressure described as squeezing in the center of her chest.  This started this morning and became worse this afternoon.  The pain is worse with breathing.  She also does have shortness of breath.  Pain is worse when she gets up to try to move as well as with breathing.  Her shortness of breath is worse with exertion.  She also feels lightheaded and weak when she stands and attempts to walk.  She was just discharged from [facility] yesterday after a full work-up please see medical history below and had the diagnosis of pulmonary embolism.  She has been compliant with Eliquis with her last dose this morning.  She reports no fevers or chills.  She spoke with her primary care physician Dr. Gao who recommended that she come to [facility].  She denies any pain or swelling to her lower extremities.        Previous Episodes: No  Current Symptoms: See above    MEDICAL HISTORY REVIEWED  In reviewing old records patient was admitted to [facility] on 6 September 4 and discharged September 7.  She was discharged with acute pulmonary embolism and migraine headache.  She had an echo, Doppler of lower extremities and a CTA of her chest.  She had a consult by hematology.  She presented with chest pain but was not hypoxic she was found to have an acute pulmonary embolism subsegmental in the right lower lobe unable to determine a cause of the pulmonary embolism she was started on Lovenox and then changed to Eliquis hypercoagulable work-up was initiated and she is scheduled to follow-up with hematology.   She also had an echo which was unremarkable.  She did have a migraine in the hospital and was treated with improvement.      PAST MEDICAL HISTORY  Active Ambulatory Problems     Diagnosis Date Noted   • Seizures (HCC)    • Pancreatitis    • Gastric ulcer    • Dyslipidemia 03/18/2016   • Environmental allergies 03/18/2016   • Intractable migraine without aura and without status migrainosus 08/08/2017     Resolved Ambulatory Problems     Diagnosis Date Noted   • No Resolved Ambulatory Problems     Past Medical History:   Diagnosis Date   • Headache, tension-type    • Migraine          PAST SURGICAL HISTORY  Past Surgical History:   Procedure Laterality Date   • LAPAROSCOPIC TUBAL LIGATION           FAMILY HISTORY  Family History   Problem Relation Age of Onset   • Diabetes Mother    • Hypertension Mother    • Migraines Mother    • Dementia Mother    • Arthritis Mother    • Kidney disease Mother    • Hypertension Father    • Diabetes Father    • Seizures Father    • Stroke Father          SOCIAL HISTORY  Social History     Socioeconomic History   • Marital status:    Tobacco Use   • Smoking status: Former Smoker   Substance and Sexual Activity   • Alcohol use: Yes     Comment: OCCASIONAL   • Drug use: No         ALLERGIES  Patient has no known allergies.        REVIEW OF SYSTEMS  Review of Systems     All systems reviewed and negative except for those discussed in HPI.       PHYSICAL EXAM    I have reviewed the triage vital signs and nursing notes.    ED Triage Vitals [09/08/22 2011]   Temp Heart Rate Resp BP SpO2   97.6 °F (36.4 °C) (!) 130 20 -- 100 %      Temp src Heart Rate Source Patient Position BP Location FiO2 (%)   -- -- -- -- --       GENERAL: Patient appears anxious.  No acute distress.Vital signs on my initial evaluation O2 sats 100% on room air patient is tachycardic with her heart rate 110s to 120s on my exam.  It is sinus tachycardia on the monitor  HENT: nares patent  Head/neck/ face are  symmetric without gross deformity, signs of trauma, or swelling  EYES: no scleral icterus, no conjunctival pallor.  NECK: Supple, no meningismus  CV: regular rhythm, tachycardia with no murmur or rub.  Normal inspection to her chest.  She has reproducible pain with palpation as well as with deep breathing.  RESPIRATORY: normal effort and no respiratory distress.  Clear to auscultation bilaterally.  She appears to have some mild hyperventilation.  ABDOMEN: soft and nontender.  Obese  MUSCULOSKELETAL: no deformity.  No edema.  Intact distal pulses to upper and lower extremities that are equal strong and symmetric.  NEURO: alert and appropriate, moves all extremities, follows commands.  No focal motor or sensory changes.  SKIN: warm, dry    Vital signs and nursing notes reviewed.        LAB RESULTS  Recent Results (from the past 24 hour(s))   Basic Metabolic Panel    Collection Time: 09/09/22  5:28 AM    Specimen: Blood   Result Value Ref Range    Glucose 83 65 - 99 mg/dL    BUN 8 6 - 20 mg/dL    Creatinine 0.74 0.57 - 1.00 mg/dL    Sodium 137 136 - 145 mmol/L    Potassium 3.7 3.5 - 5.2 mmol/L    Chloride 110 (H) 98 - 107 mmol/L    CO2 20.6 (L) 22.0 - 29.0 mmol/L    Calcium 9.0 8.6 - 10.5 mg/dL    BUN/Creatinine Ratio 10.8 7.0 - 25.0    Anion Gap 6.4 5.0 - 15.0 mmol/L    eGFR 105.7 >60.0 mL/min/1.73   CBC Auto Differential    Collection Time: 09/09/22  5:28 AM    Specimen: Blood   Result Value Ref Range    WBC 6.85 3.40 - 10.80 10*3/mm3    RBC 4.00 3.77 - 5.28 10*6/mm3    Hemoglobin 12.2 12.0 - 15.9 g/dL    Hematocrit 34.9 34.0 - 46.6 %    MCV 87.3 79.0 - 97.0 fL    MCH 30.5 26.6 - 33.0 pg    MCHC 35.0 31.5 - 35.7 g/dL    RDW 13.4 12.3 - 15.4 %    RDW-SD 42.3 37.0 - 54.0 fl    MPV 9.6 6.0 - 12.0 fL    Platelets 358 140 - 450 10*3/mm3    Neutrophil % 68.3 42.7 - 76.0 %    Lymphocyte % 22.5 19.6 - 45.3 %    Monocyte % 6.6 5.0 - 12.0 %    Eosinophil % 1.6 0.3 - 6.2 %    Basophil % 0.4 0.0 - 1.5 %    Immature Grans % 0.6  (H) 0.0 - 0.5 %    Neutrophils, Absolute 4.68 1.70 - 7.00 10*3/mm3    Lymphocytes, Absolute 1.54 0.70 - 3.10 10*3/mm3    Monocytes, Absolute 0.45 0.10 - 0.90 10*3/mm3    Eosinophils, Absolute 0.11 0.00 - 0.40 10*3/mm3    Basophils, Absolute 0.03 0.00 - 0.20 10*3/mm3    Immature Grans, Absolute 0.04 0.00 - 0.05 10*3/mm3    nRBC 0.0 0.0 - 0.2 /100 WBC       Ordered the above labs and independently reviewed the results.        RADIOLOGY  No Radiology Exams Resulted Within Past 24 Hours    I ordered the above noted radiological studies. Reviewed by me and discussed with radiologist.  See dictation for official radiology interpretation.      PROCEDURES    Procedures      MEDICATIONS GIVEN IN ER    Medications   sodium chloride 0.9 % flush 10 mL (has no administration in time range)   sodium chloride 0.9 % flush 10 mL (has no administration in time range)   sodium chloride 0.9 % infusion (0 mL/hr Intravenous Stopped 9/9/22 1539)   amitriptyline (ELAVIL) tablet 75 mg (75 mg Oral Given 9/9/22 2130)   apixaban (ELIQUIS) tablet 5 mg (5 mg Oral Given 9/9/22 2130)   brivaracetam (BRIVIACT) tablet 100 mg (100 mg Oral Given 9/9/22 2130)   busPIRone (BUSPAR) tablet 10 mg (10 mg Oral Given 9/9/22 2130)   carvedilol (COREG) tablet 3.125 mg (3.125 mg Oral Given 9/9/22 1701)   fluticasone (FLONASE) 50 MCG/ACT nasal spray 2 spray (2 sprays Nasal Given 9/9/22 0821)   hydrOXYzine (ATARAX) tablet 25 mg (has no administration in time range)   ondansetron (ZOFRAN) tablet 4 mg (has no administration in time range)   pantoprazole (PROTONIX) EC tablet 40 mg (40 mg Oral Given 9/9/22 0821)   promethazine (PHENERGAN) tablet 25 mg (25 mg Oral Given 9/9/22 0138)   tiZANidine (ZANAFLEX) tablet 4 mg (has no administration in time range)   zolpidem (AMBIEN) tablet 5 mg (5 mg Oral Given 9/9/22 0234)   sodium chloride 0.9 % flush 10 mL (10 mL Intravenous Given 9/9/22 2124)   sodium chloride 0.9 % flush 10 mL (has no administration in time range)    nitroglycerin (NITROSTAT) SL tablet 0.4 mg (has no administration in time range)   acetaminophen (TYLENOL) tablet 650 mg (has no administration in time range)     Or   acetaminophen (TYLENOL) 160 MG/5ML solution 650 mg (has no administration in time range)     Or   acetaminophen (TYLENOL) suppository 650 mg (has no administration in time range)   ondansetron (ZOFRAN) injection 4 mg (has no administration in time range)   topiramate (TOPAMAX) tablet 100 mg (100 mg Oral Given 9/10/22 0010)     And   topiramate (TOPAMAX) tablet 50 mg (50 mg Oral Given 9/9/22 0837)   oxyCODONE (ROXICODONE) immediate release tablet 5 mg (5 mg Oral Given 9/10/22 0015)   Lidocaine Viscous HCl (XYLOCAINE) 2 % solution 15 mL (15 mL Mouth/Throat Given 9/9/22 1704)   aluminum-magnesium hydroxide-simethicone (MAALOX MAX) 400-400-40 MG/5ML suspension 15 mL (15 mL Oral Given 9/9/22 1704)   aspirin tablet 325 mg (325 mg Oral Given 9/8/22 2134)   sodium chloride 0.9 % bolus 500 mL (0 mL Intravenous Stopped 9/8/22 2324)   ondansetron (ZOFRAN) injection 4 mg (4 mg Intravenous Given 9/8/22 2149)   iopamidol (ISOVUE-370) 76 % injection 95 mL (95 mL Intravenous Given 9/8/22 2236)   iopamidol (ISOVUE-370) 76 % injection 95 mL (95 mL Intravenous Given 9/8/22 2237)   apixaban (ELIQUIS) tablet 10 mg (10 mg Oral Given 9/8/22 2335)   sodium chloride 0.9 % bolus 500 mL (0 mL Intravenous Stopped 9/10/22 0015)         PROGRESS, DATA ANALYSIS, CONSULTS, AND MEDICAL DECISION MAKING    This patient was just seen at [facility] and discharged yesterday with a diagnosis of pulmonary embolism.  She had work-up consisted of an echo which was unremarkable as well.  She reports she has been compliant with Eliquis.  She has worsening of her pain, worsening of her shortness of breath, and feeling lightheadedness, and overall she is feeling worse rather than better.  She called her primary care physician who instructed her to come to [facility].  Informed her  we can repeat a CT scan of the chest.  I have looked at the EKG and we will check other tests as well.  All questions answered at this time.    We are currently under a pandemic from the COVID19 infection.  The patient presented to the emergency department by ambulance or personal vehicle. I followed the current protocols required by Infection Control at [facility] in my evaluation and treatment of the patient. The patient was wearing a face mask during my evaluation and throughout my encounter. During my whole encounter with this patient I used appropriate personal protective equipment.  This equipment consisted of eye protection, facemask, gown, and gloves.  I applied this equipment before entering the room.      All labs have been independently reviewed by me.  All radiology studies have been reviewed by me and discussed with radiologist dictating the report.   EKG's independently viewed and interpreted by me.  Discussion below represents my analysis of pertinent findings related to patient's condition, differential diagnosis, treatment plan and final disposition.      ED Course as of 09/10/22 0304   Thu Sep 08, 2022   2055 EKG reading  EKG was done at 2017  At a rate of 106 it is a sinus tachycardia it is a narrow complex I do not appreciate any acute injury pattern  There are some nonspecific ST depression V3 through V6 again I will see any ST elevation  QT looks normal  I compared to the previous EKG that was done on September 1, 2022 and the EKGs look very similar. [MM]   2223 Troponin T: <0.010 [MM]   2223 ALT (SGPT)(!): 102 [MM]   2223 AST (SGOT)(!): 59 [MM]   2223 Mild increase from 7 days ago. [MM]   2223 Chest x-ray report was reviewed and I looked at the chest x-ray essentially was unremarkable. [MM]   2315 I discussed the case with Pretty who is the midlevel provider on for [facility].  Explained to her the patient's symptoms results of test.  In her recent treatment at [facility].   All questions answered. [MM]   2315 She is still in the first week of treatment.  And she has not taken her dose of Eliquis tonight.  I will give her 10 mg of Eliquis now. [MM]   2320 CT angiogram of the chest is suspected for a pulmonary embolism in the subsegmental branch of the right lower lobe.  There is no obvious signs of cor pulmonale or proximal pulmonary embolism.  No evidence of pulmonary infarction or right heart strain.  Please see complete dictated report from radiologist [MM]   2324 I informed the patient of the results of the test.  She does appear anxious on reevaluation.  Heart rate is 90s to low 100s blood pressure is normal O2 sat is 100% on room air [MM]      ED Course User Index  [MM] Marcellus Sandoval MD       AS OF 03:04 EDT VITALS:    BP - 111/66  HR - 73  TEMP - 97.6 °F (36.4 °C) (Oral)  02 SATS - 100%        DIAGNOSIS  Final diagnoses:   Single subsegmental pulmonary embolism without acute cor pulmonale (HCC)   Near syncope         DISPOSITION  I have reviewed the test results with my patient and explained the current treatment plan.  I answered all of the patient's questions.  The patient will be admitted to monitor bed at this time.  The patient is not hypotensive and is tolerating their current disease condition well enough for a monitored bed at this time.  The patient's current condition does not require intensive care treatment at this time.            DICTATED UTILIZING DRAGON DICTATION    Note Disclaimer: At [facility], we believe that sharing information builds trust and better relationships. You are receiving this note because you recently visited [facility]. It is possible you will see health information before a provider has talked with you about it. This kind of information can be easy to misunderstand. To help you fully understand what it means for your health, we urge you to discuss this note with your provider.     Marcellus Sandoval MD  09/10/22 0680     Detail Level: Zone Size Of Lesion In Cm (Optional): 0 Detail Level: Detailed

## 2023-01-10 DIAGNOSIS — N70.11 HYDROSALPINX: ICD-10-CM

## 2023-01-10 RX ORDER — HYDROCODONE BITARTRATE AND ACETAMINOPHEN 5; 325 MG/1; MG/1
1 TABLET ORAL EVERY 4 HOURS PRN
Qty: 20 TABLET | Refills: 0 | Status: SHIPPED | OUTPATIENT
Start: 2023-01-10 | End: 2023-01-19

## 2023-01-16 RX ORDER — LINACLOTIDE 72 UG/1
CAPSULE, GELATIN COATED ORAL
Qty: 90 CAPSULE | Refills: 2 | Status: SHIPPED | OUTPATIENT
Start: 2023-01-16

## 2023-01-19 DIAGNOSIS — N70.11 HYDROSALPINX: ICD-10-CM

## 2023-01-19 RX ORDER — HYDROCODONE BITARTRATE AND ACETAMINOPHEN 5; 325 MG/1; MG/1
1 TABLET ORAL EVERY 4 HOURS PRN
Qty: 20 TABLET | Refills: 0 | Status: SHIPPED | OUTPATIENT
Start: 2023-01-19 | End: 2023-01-23

## 2023-01-20 ENCOUNTER — TELEPHONE (OUTPATIENT)
Dept: GASTROENTEROLOGY | Facility: CLINIC | Age: 38
End: 2023-01-20
Payer: OTHER MISCELLANEOUS

## 2023-01-20 DIAGNOSIS — N70.11 HYDROSALPINX: ICD-10-CM

## 2023-01-23 ENCOUNTER — TELEMEDICINE (OUTPATIENT)
Dept: FAMILY MEDICINE CLINIC | Facility: CLINIC | Age: 38
End: 2023-01-23
Payer: COMMERCIAL

## 2023-01-23 DIAGNOSIS — U07.1 COVID-19 VIRUS INFECTION: Primary | ICD-10-CM

## 2023-01-23 DIAGNOSIS — N70.11 HYDROSALPINX: ICD-10-CM

## 2023-01-23 PROCEDURE — 99213 OFFICE O/P EST LOW 20 MIN: CPT | Performed by: FAMILY MEDICINE

## 2023-01-23 RX ORDER — HYDROCODONE BITARTRATE AND ACETAMINOPHEN 5; 325 MG/1; MG/1
1 TABLET ORAL EVERY 4 HOURS PRN
Qty: 20 TABLET | Refills: 0 | Status: SHIPPED | OUTPATIENT
Start: 2023-01-23 | End: 2023-01-23 | Stop reason: SDUPTHER

## 2023-01-23 RX ORDER — HYDROCODONE BITARTRATE AND ACETAMINOPHEN 5; 325 MG/1; MG/1
1 TABLET ORAL EVERY 4 HOURS PRN
Qty: 20 TABLET | Refills: 0 | Status: SHIPPED | OUTPATIENT
Start: 2023-01-23 | End: 2023-03-23

## 2023-01-23 NOTE — LETTER
January 23, 2023     Patient: Liya Carlson   YOB: 1985   Date of Visit: 1/23/2023       To Whom It May Concern:    It is my medical opinion that Liya Carlson may return to work no Jan 30, 2023 due to her current symptoms.              Sincerely,        Jerilyn Ventura MD    CC:   No Recipients

## 2023-01-23 NOTE — PROGRESS NOTES
You have chosen to receive care through a telehealth visit.  Do you consent to use a video/audio connection for your medical care today? Yes  Patient at home   Dr. Ventura in office     Chief Complaint  covid    Subjective    History of Present Illness {CC  Problem List  Visit  Diagnosis   Encounters  Notes  Medications  Labs  Result Review Imaging  Media :23}     Liya Carlson presents to [facility] for   History of Present Illness     36 yo female present for video visit.  State she was diagnosed with covid last Tuesday 1/17/23 taking mucinex, inhaler and tussin perle and tylenol. States he had infeusin last week  States using mucinex every 12 hours.   States she is coughing and whole body hurting, headache  Ringing in ears.   Just got inhaler today.    Coricidin hbp        Social History     Socioeconomic History   • Marital status:    Tobacco Use   • Smoking status: Never   • Smokeless tobacco: Never   Vaping Use   • Vaping Use: Never used   Substance and Sexual Activity   • Alcohol use: Yes     Comment: OCCASIONAL   • Drug use: No   • Sexual activity: Defer      Objective     Vital Signs:   There were no vitals taken for this visit.   Video visit     Physical Exam  Constitutional:       General: She is not in acute distress.     Comments: Lying in bed   HENT:      Head: Normocephalic.   Pulmonary:      Effort: No respiratory distress.   Neurological:      Mental Status: She is alert.        Result Review  Data Reviewed:{ Labs  Result Review  Imaging  Med Tab  Media :23}                Current Outpatient Medications:   •  amitriptyline (ELAVIL) 75 MG tablet, Take 1 tablet by mouth Every Night., Disp: , Rfl:   •  apixaban (ELIQUIS) 5 MG tablet tablet, Take 1 tablet by mouth 2 (Two) Times a Day., Disp: 180 tablet, Rfl: 3  •  Brivaracetam (Briviact) 100 MG tablet, Take 1 tablet by mouth 2 (Two) Times a Day., Disp: , Rfl:   •  busPIRone (BUSPAR) 10 MG tablet, Take  1 tablet by mouth 2 (Two) Times a Day., Disp: 180 tablet, Rfl: 3  •  carvedilol (Coreg) 3.125 MG tablet, Take 1 tablet by mouth 2 (Two) Times a Day With Meals., Disp: 180 tablet, Rfl: 3  •  fluticasone (Flonase) 50 MCG/ACT nasal spray, 2 sprays into the nostril(s) as directed by provider Daily for 30 days. Administer 2 sprays in each nostril for each dose., Disp: 18.2 mL, Rfl: 3  •  HYDROcodone-acetaminophen (NORCO) 5-325 MG per tablet, Take 1 tablet by mouth every 4 (four) hours as needed for moderate pain., Disp: 20 tablet, Rfl: 0  •  hydrOXYzine (ATARAX) 25 MG tablet, Take 1 tablet by mouth Every 8 (Eight) Hours As Needed for Anxiety. Do not drive, Disp: 90 tablet, Rfl: 3  •  Linzess 72 MCG capsule capsule, Take 1 capsule by mouth Every Morning Before Breakfast., Disp: 90 capsule, Rfl: 2  •  metroNIDAZOLE (Flagyl) 500 MG tablet, Take 1 tablet by mouth 2 (Two) Times a Day., Disp: 14 tablet, Rfl: 0  •  ondansetron (ZOFRAN) 4 MG tablet, Take 1 tablet by mouth Every 8 (Eight) Hours As Needed for Nausea or Vomiting., Disp: 30 tablet, Rfl: 3  •  pantoprazole (PROTONIX) 40 MG EC tablet, Take 1 tablet by mouth Daily., Disp: 90 tablet, Rfl: 3  •  promethazine (PHENERGAN) 25 MG tablet, Take 1 tablet by mouth Every 8 (Eight) Hours As Needed for Nausea or Vomiting., Disp: 30 tablet, Rfl: 3  •  rizatriptan (MAXALT) 5 MG tablet, Take 2 tablets by mouth 1 (One) Time As Needed for Migraine. May repeat in 2 hours if needed, Disp: 12 tablet, Rfl: 0  •  tiZANidine (ZANAFLEX) 4 MG tablet, Take 1 tablet by mouth Every 8 (Eight) Hours As Needed for Muscle Spasms (Do not drive)., Disp: 90 tablet, Rfl: 4  •  topiramate (TOPAMAX) 50 MG tablet, Take 1 tablet by mouth Every Night. (Patient taking differently: Take 1 tablet by mouth 2 (Two) Times a Day. 50 MG in the Am and 100mg at night), Disp: 30 tablet, Rfl: 3  •  traMADol (ULTRAM) 50 MG tablet, Take 1 tablet by mouth Every 8 (Eight) Hours As Needed for Severe Pain (Chronic pain medicine  for migraine)., Disp: 90 tablet, Rfl: 3  •  zolpidem (AMBIEN) 10 MG tablet, 1 p.o. at bedtime as needed sleep, Disp: 90 tablet, Rfl: 3      Assessment and Plan {CC Problem List  Visit Diagnosis  ROS  Review (Popup)  Health Maintenance  Quality  BestPractice  Medications  SmartSets  SnapShot Encounters  Media :23}   Problem List Items Addressed This Visit        Other    COVID-19 virus infection - Primary    Current Assessment & Plan     Pt not improving at home despite infusion and medication    She was prescribed inhaler today, advise to use every 6-8 hours   Monitor respiratory symptoms, if worsening of symptoms go to ER for further evaluation   Work note completed to extend time off work given she is not improving  Can try coricidin hbp to help with cold symptoms               Extension off work for the complete 10 days recommend going back to work on 30th.        Follow Up   Return if symptoms worsen or fail to improve.  Patient was given instructions and counseling regarding her condition or for health maintenance advice. Please see specific information pulled into the AVS if appropriate.    EpicAct:MR_WS_AMB_ORDERS,RunParams:STARTUPTYPE=FOLLOW    MR_WS_AMB_DISCHARGE

## 2023-01-23 NOTE — ASSESSMENT & PLAN NOTE
Pt not improving at home despite infusion and medication    She was prescribed inhaler today, advise to use every 6-8 hours   Monitor respiratory symptoms, if worsening of symptoms go to ER for further evaluation   Work note completed to extend time off work given she is not improving  Advise to try coricidin hbp to help with cold symptoms

## 2023-01-25 ENCOUNTER — TELEPHONE (OUTPATIENT)
Dept: FAMILY MEDICINE CLINIC | Facility: CLINIC | Age: 38
End: 2023-01-25
Payer: OTHER MISCELLANEOUS

## 2023-01-25 NOTE — TELEPHONE ENCOUNTER
Caller: [place], [place] - [address] AT [facility] [address] - [phone]  - [phone] FX    Relationship: Pharmacy    Best call back number: [phone]    What is the best time to reach you: ANYTIME     Who are you requesting to speak with (clinical staff, provider,  specific staff member): CLINICAL STAFF     Do you know the name of the person who called: NOEL     What was the call regarding: NOEL FROM [facility] IS CALLING TO REQUEST A PRIOR AUTHORIZATION ON THE PATIENTS traMADol (ULTRAM) 50 MG tablet      NOEL HAS ALSO PROVIDED THE CLINICAL STAFF COVERMYMEDS KEY FOR THIS PA- [id number]  Do you require a callback: NO

## 2023-02-15 DIAGNOSIS — Z00.00 PHYSICAL EXAM, ANNUAL: ICD-10-CM

## 2023-02-15 RX ORDER — RIZATRIPTAN BENZOATE 5 MG/1
TABLET ORAL
Qty: 12 TABLET | Refills: 0 | Status: SHIPPED | OUTPATIENT
Start: 2023-02-15

## 2023-03-02 ENCOUNTER — APPOINTMENT (OUTPATIENT)
Dept: CT IMAGING | Facility: HOSPITAL | Age: 38
End: 2023-03-02
Payer: COMMERCIAL

## 2023-03-02 ENCOUNTER — HOSPITAL ENCOUNTER (EMERGENCY)
Facility: HOSPITAL | Age: 38
Discharge: HOME | End: 2023-03-02
Attending: EMERGENCY MEDICINE | Admitting: EMERGENCY MEDICINE
Payer: COMMERCIAL

## 2023-03-02 ENCOUNTER — APPOINTMENT (OUTPATIENT)
Dept: GENERAL RADIOLOGY | Facility: HOSPITAL | Age: 38
End: 2023-03-02
Payer: COMMERCIAL

## 2023-03-02 VITALS
WEIGHT: 185 LBS | HEART RATE: 87 BPM | HEIGHT: 67 IN | TEMPERATURE: 97.6 F | BODY MASS INDEX: 29.03 KG/M2 | DIASTOLIC BLOOD PRESSURE: 93 MMHG | RESPIRATION RATE: 18 BRPM | SYSTOLIC BLOOD PRESSURE: 144 MMHG | OXYGEN SATURATION: 100 %

## 2023-03-02 VITALS
BODY MASS INDEX: 28.98 KG/M2 | SYSTOLIC BLOOD PRESSURE: 137 MMHG | TEMPERATURE: 98.1 F | RESPIRATION RATE: 18 BRPM | HEIGHT: 67 IN | DIASTOLIC BLOOD PRESSURE: 94 MMHG | OXYGEN SATURATION: 96 % | HEART RATE: 91 BPM

## 2023-03-02 DIAGNOSIS — R10.32 LEFT LOWER QUADRANT ABDOMINAL PAIN: Primary | ICD-10-CM

## 2023-03-02 DIAGNOSIS — R07.89 ATYPICAL CHEST PAIN: ICD-10-CM

## 2023-03-02 DIAGNOSIS — R19.7 DIARRHEA, UNSPECIFIED TYPE: ICD-10-CM

## 2023-03-02 DIAGNOSIS — N39.0 ACUTE UTI: ICD-10-CM

## 2023-03-02 DIAGNOSIS — M54.40 BACK PAIN OF LUMBAR REGION WITH SCIATICA: Primary | ICD-10-CM

## 2023-03-02 LAB
ALBUMIN SERPL-MCNC: 3.8 G/DL (ref 3.5–5.2)
ALBUMIN/GLOB SERPL: 1.2 G/DL
ALP SERPL-CCNC: 70 U/L (ref 39–117)
ALT SERPL W P-5'-P-CCNC: 19 U/L (ref 1–33)
ANION GAP SERPL CALCULATED.3IONS-SCNC: 12.4 MMOL/L (ref 5–15)
AST SERPL-CCNC: 12 U/L (ref 1–32)
BACTERIA UR QL AUTO: ABNORMAL /HPF
BASOPHILS # BLD AUTO: 0.02 10*3/MM3 (ref 0–0.2)
BASOPHILS NFR BLD AUTO: 0.3 % (ref 0–1.5)
BILIRUB SERPL-MCNC: <0.2 MG/DL (ref 0–1.2)
BILIRUB UR QL STRIP: NEGATIVE
BUN SERPL-MCNC: 12 MG/DL (ref 6–20)
BUN/CREAT SERPL: 15.2 (ref 7–25)
CALCIUM SPEC-SCNC: 8.9 MG/DL (ref 8.6–10.5)
CHLORIDE SERPL-SCNC: 107 MMOL/L (ref 98–107)
CLARITY UR: ABNORMAL
CO2 SERPL-SCNC: 20.6 MMOL/L (ref 22–29)
COLOR UR: ABNORMAL
CREAT SERPL-MCNC: 0.79 MG/DL (ref 0.57–1)
DEPRECATED RDW RBC AUTO: 44.2 FL (ref 37–54)
EGFRCR SERPLBLD CKD-EPI 2021: 97.7 ML/MIN/1.73
EOSINOPHIL # BLD AUTO: 0.2 10*3/MM3 (ref 0–0.4)
EOSINOPHIL NFR BLD AUTO: 3 % (ref 0.3–6.2)
ERYTHROCYTE [DISTWIDTH] IN BLOOD BY AUTOMATED COUNT: 13.4 % (ref 12.3–15.4)
FLUAV SUBTYP SPEC NAA+PROBE: NOT DETECTED
FLUBV RNA ISLT QL NAA+PROBE: NOT DETECTED
GEN 5 2HR TROPONIN T REFLEX: <6 NG/L
GLOBULIN UR ELPH-MCNC: 3.1 GM/DL
GLUCOSE SERPL-MCNC: 101 MG/DL (ref 65–99)
GLUCOSE UR STRIP-MCNC: NEGATIVE MG/DL
HCG SERPL QL: NEGATIVE
HCT VFR BLD AUTO: 37.4 % (ref 34–46.6)
HGB BLD-MCNC: 12.6 G/DL (ref 12–15.9)
HGB UR QL STRIP.AUTO: NEGATIVE
HOLD SPECIMEN: NORMAL
HOLD SPECIMEN: NORMAL
HYALINE CASTS UR QL AUTO: ABNORMAL /LPF
IMM GRANULOCYTES # BLD AUTO: 0.03 10*3/MM3 (ref 0–0.05)
IMM GRANULOCYTES NFR BLD AUTO: 0.4 % (ref 0–0.5)
KETONES UR QL STRIP: NEGATIVE
LEUKOCYTE ESTERASE UR QL STRIP.AUTO: NEGATIVE
LYMPHOCYTES # BLD AUTO: 2.28 10*3/MM3 (ref 0.7–3.1)
LYMPHOCYTES NFR BLD AUTO: 34.2 % (ref 19.6–45.3)
MCH RBC QN AUTO: 29.9 PG (ref 26.6–33)
MCHC RBC AUTO-ENTMCNC: 33.7 G/DL (ref 31.5–35.7)
MCV RBC AUTO: 88.8 FL (ref 79–97)
MONOCYTES # BLD AUTO: 0.3 10*3/MM3 (ref 0.1–0.9)
MONOCYTES NFR BLD AUTO: 4.5 % (ref 5–12)
NEUTROPHILS NFR BLD AUTO: 3.84 10*3/MM3 (ref 1.7–7)
NEUTROPHILS NFR BLD AUTO: 57.6 % (ref 42.7–76)
NITRITE UR QL STRIP: POSITIVE
NRBC BLD AUTO-RTO: 0 /100 WBC (ref 0–0.2)
PH UR STRIP.AUTO: 7.5 [PH] (ref 5–8)
PLATELET # BLD AUTO: 315 10*3/MM3 (ref 140–450)
PMV BLD AUTO: 9.8 FL (ref 6–12)
POTASSIUM SERPL-SCNC: 3.8 MMOL/L (ref 3.5–5.2)
PROT SERPL-MCNC: 6.9 G/DL (ref 6–8.5)
PROT UR QL STRIP: NEGATIVE
QT INTERVAL: 340 MS
QT INTERVAL: 358 MS
RBC # BLD AUTO: 4.21 10*6/MM3 (ref 3.77–5.28)
RBC # UR STRIP: ABNORMAL /HPF
REF LAB TEST METHOD: ABNORMAL
RSV RNA NPH QL NAA+NON-PROBE: NOT DETECTED
SARS-COV-2 RNA NPH QL NAA+NON-PROBE: NOT DETECTED
SODIUM SERPL-SCNC: 140 MMOL/L (ref 136–145)
SP GR UR STRIP: 1.03 (ref 1–1.03)
SQUAMOUS #/AREA URNS HPF: ABNORMAL /HPF
TROPONIN T DELTA: NORMAL
TROPONIN T SERPL HS-MCNC: <6 NG/L
UROBILINOGEN UR QL STRIP: ABNORMAL
WBC # UR STRIP: ABNORMAL /HPF
WBC NRBC COR # BLD: 6.67 10*3/MM3 (ref 3.4–10.8)
WHOLE BLOOD HOLD COAG: NORMAL
WHOLE BLOOD HOLD SPECIMEN: NORMAL

## 2023-03-02 PROCEDURE — 25510000001 IOPAMIDOL 61 % SOLUTION: Performed by: EMERGENCY MEDICINE

## 2023-03-02 PROCEDURE — 93005 ELECTROCARDIOGRAM TRACING: CPT | Performed by: EMERGENCY MEDICINE

## 2023-03-02 PROCEDURE — 25010000002 HYDROMORPHONE PER 4 MG: Performed by: EMERGENCY MEDICINE

## 2023-03-02 PROCEDURE — 96374 THER/PROPH/DIAG INJ IV PUSH: CPT

## 2023-03-02 PROCEDURE — 93010 ELECTROCARDIOGRAM REPORT: CPT | Performed by: INTERNAL MEDICINE

## 2023-03-02 PROCEDURE — 96376 TX/PRO/DX INJ SAME DRUG ADON: CPT

## 2023-03-02 PROCEDURE — 99283 EMERGENCY DEPT VISIT LOW MDM: CPT

## 2023-03-02 PROCEDURE — 71046 X-RAY EXAM CHEST 2 VIEWS: CPT

## 2023-03-02 PROCEDURE — 74177 CT ABD & PELVIS W/CONTRAST: CPT

## 2023-03-02 PROCEDURE — 36415 COLL VENOUS BLD VENIPUNCTURE: CPT

## 2023-03-02 PROCEDURE — 96372 THER/PROPH/DIAG INJ SC/IM: CPT

## 2023-03-02 PROCEDURE — 84484 ASSAY OF TROPONIN QUANT: CPT | Performed by: EMERGENCY MEDICINE

## 2023-03-02 PROCEDURE — 81001 URINALYSIS AUTO W/SCOPE: CPT | Performed by: EMERGENCY MEDICINE

## 2023-03-02 PROCEDURE — 25010000002 KETOROLAC TROMETHAMINE PER 15 MG: Performed by: EMERGENCY MEDICINE

## 2023-03-02 PROCEDURE — 99284 EMERGENCY DEPT VISIT MOD MDM: CPT

## 2023-03-02 PROCEDURE — 25010000002 ONDANSETRON PER 1 MG: Performed by: EMERGENCY MEDICINE

## 2023-03-02 PROCEDURE — 85025 COMPLETE CBC W/AUTO DIFF WBC: CPT | Performed by: EMERGENCY MEDICINE

## 2023-03-02 PROCEDURE — 96375 TX/PRO/DX INJ NEW DRUG ADDON: CPT

## 2023-03-02 PROCEDURE — 93005 ELECTROCARDIOGRAM TRACING: CPT

## 2023-03-02 PROCEDURE — 84703 CHORIONIC GONADOTROPIN ASSAY: CPT | Performed by: EMERGENCY MEDICINE

## 2023-03-02 PROCEDURE — 80053 COMPREHEN METABOLIC PANEL: CPT | Performed by: EMERGENCY MEDICINE

## 2023-03-02 PROCEDURE — 87637 SARSCOV2&INF A&B&RSV AMP PRB: CPT

## 2023-03-02 RX ORDER — ONDANSETRON 2 MG/ML
4 INJECTION INTRAMUSCULAR; INTRAVENOUS ONCE
Status: COMPLETED | OUTPATIENT
Start: 2023-03-02 | End: 2023-03-02

## 2023-03-02 RX ORDER — CEFUROXIME AXETIL 500 MG/1
500 TABLET ORAL 2 TIMES DAILY
Qty: 14 TABLET | Refills: 0 | Status: SHIPPED | OUTPATIENT
Start: 2023-03-02

## 2023-03-02 RX ORDER — ASPIRIN 325 MG
325 TABLET ORAL ONCE
Status: DISCONTINUED | OUTPATIENT
Start: 2023-03-02 | End: 2023-03-02

## 2023-03-02 RX ORDER — ONDANSETRON 8 MG/1
8 TABLET, ORALLY DISINTEGRATING ORAL EVERY 8 HOURS PRN
Qty: 12 TABLET | Refills: 0 | Status: SHIPPED | OUTPATIENT
Start: 2023-03-02

## 2023-03-02 RX ORDER — KETOROLAC TROMETHAMINE 15 MG/ML
15 INJECTION, SOLUTION INTRAMUSCULAR; INTRAVENOUS ONCE
Status: COMPLETED | OUTPATIENT
Start: 2023-03-02 | End: 2023-03-02

## 2023-03-02 RX ORDER — SODIUM CHLORIDE 0.9 % (FLUSH) 0.9 %
10 SYRINGE (ML) INJECTION AS NEEDED
Status: DISCONTINUED | OUTPATIENT
Start: 2023-03-02 | End: 2023-03-02 | Stop reason: HOSPADM

## 2023-03-02 RX ORDER — CEPHALEXIN 500 MG/1
1000 CAPSULE ORAL ONCE
Status: COMPLETED | OUTPATIENT
Start: 2023-03-02 | End: 2023-03-02

## 2023-03-02 RX ORDER — HYDROMORPHONE HYDROCHLORIDE 1 MG/ML
0.5 INJECTION, SOLUTION INTRAMUSCULAR; INTRAVENOUS; SUBCUTANEOUS ONCE
Status: COMPLETED | OUTPATIENT
Start: 2023-03-02 | End: 2023-03-02

## 2023-03-02 RX ORDER — HYDROMORPHONE HYDROCHLORIDE 1 MG/ML
0.25 INJECTION, SOLUTION INTRAMUSCULAR; INTRAVENOUS; SUBCUTANEOUS ONCE
Status: COMPLETED | OUTPATIENT
Start: 2023-03-02 | End: 2023-03-02

## 2023-03-02 RX ADMIN — CEPHALEXIN 1000 MG: 500 CAPSULE ORAL at 19:49

## 2023-03-02 RX ADMIN — IOPAMIDOL 85 ML: 612 INJECTION, SOLUTION INTRAVENOUS at 04:29

## 2023-03-02 RX ADMIN — KETOROLAC TROMETHAMINE 15 MG: 15 INJECTION, SOLUTION INTRAMUSCULAR; INTRAVENOUS at 16:33

## 2023-03-02 RX ADMIN — SODIUM CHLORIDE 1000 ML: 9 INJECTION, SOLUTION INTRAVENOUS at 04:00

## 2023-03-02 RX ADMIN — HYDROMORPHONE HYDROCHLORIDE 0.5 MG: 1 INJECTION, SOLUTION INTRAMUSCULAR; INTRAVENOUS; SUBCUTANEOUS at 04:01

## 2023-03-02 RX ADMIN — ONDANSETRON 4 MG: 2 INJECTION INTRAMUSCULAR; INTRAVENOUS at 06:17

## 2023-03-02 RX ADMIN — HYDROMORPHONE HYDROCHLORIDE 0.25 MG: 1 INJECTION, SOLUTION INTRAMUSCULAR; INTRAVENOUS; SUBCUTANEOUS at 06:17

## 2023-03-02 RX ADMIN — ONDANSETRON 4 MG: 2 INJECTION INTRAMUSCULAR; INTRAVENOUS at 04:01

## 2023-03-02 NOTE — ED PROVIDER NOTES
EMERGENCY DEPARTMENT ENCOUNTER    Room Number:  37/37  Date of encounter:  3/2/2023  PCP: Servando Gao MD  Historian: Patient    Patient was placed in face mask during triage process. Patient was wearing facemask when I entered the room and throughout our encounter. I wore full protective equipment throughout this patient encounter including a face mask, eye protection, and gloves. Hand hygiene was performed before donning protective equipment and again following doffing of PPE after leaving the room.    HPI:  Chief Complaint: Headache, low back pain  A complete HPI/ROS/PMH/PSH/SH/FH are unobtainable due to: N/A   Context: Liya Carlson is a 37 y.o. female who presents to the ED c/o persistent headache, low back pain radiating to the left leg, some left lower quadrant discomfort, and some intermittent dyspnea.  Patient has been experiencing symptoms on and off for several days to a week.  She was seen this morning in the ED and had a thorough evaluation including CT abdomen pelvis with laboratory evaluation EKG and 2 sets of troponins.  Patient reports she felt worse after she went home.  No reported vomiting, dysuria or black or bloody stools.      MEDICAL HISTORY REVIEW  EMR reviewed:    Admission 9/8/2022 for single subsegmental pulmonary embolism for which she is now chronically anticoagulated    PAST MEDICAL HISTORY  Active Ambulatory Problems     Diagnosis Date Noted   • Seizures (HCC)    • Pancreatitis    • Gastric ulcer    • Dyslipidemia 03/18/2016   • Environmental allergies 03/18/2016   • Intractable migraine without aura and without status migrainosus 08/08/2017   • Single subsegmental pulmonary embolism without acute cor pulmonale (HCC) 09/08/2022   • Abnormal CT scan, sigmoid colon 09/08/2022   • Menorrhagia with regular cycle 05/08/2018   • Muscle spasms of lower extremity 10/07/2022   • COVID-19 virus infection 01/23/2023   • Abdominal pain 09/23/2015     Resolved Ambulatory Problems      Diagnosis Date Noted   • No Resolved Ambulatory Problems     Past Medical History:   Diagnosis Date   • Chest wall contusion    • Contusion, abdominal wall    • Headache, tension-type    • History of blood clots    • Migraine    • Pulmonary embolism (HCC)          PAST SURGICAL HISTORY  Past Surgical History:   Procedure Laterality Date   • LAPAROSCOPIC TUBAL LIGATION     • SIGMOIDOSCOPY N/A 9/11/2022    Procedure: SIGMOIDOSCOPY FLEXIBLE TO DESCENDING COLON;  Surgeon: Sierra Kerr MD;  Location: [facility] ENDOSCOPY;  Service: Gastroenterology;  Laterality: N/A;  PRE- ABDOMINAL PAIN, ABNORMAL CT  POST- SMALL HEMORRHOIDS         FAMILY HISTORY  Family History   Problem Relation Age of Onset   • Diabetes Mother    • Hypertension Mother    • Migraines Mother    • Dementia Mother    • Arthritis Mother    • Kidney disease Mother    • Colon polyps Father    • Hypertension Father    • Diabetes Father    • Seizures Father    • Stroke Father    • Colon cancer Paternal Aunt    • Colon polyps Paternal Aunt    • Colon cancer Paternal Grandmother    • Colon polyps Paternal Grandmother          SOCIAL HISTORY  Social History     Socioeconomic History   • Marital status:    Tobacco Use   • Smoking status: Never   • Smokeless tobacco: Never   Vaping Use   • Vaping Use: Never used   Substance and Sexual Activity   • Alcohol use: Yes     Comment: OCCASIONAL   • Drug use: No   • Sexual activity: Defer         ALLERGIES  Patient has no known allergies.        REVIEW OF SYSTEMS  Review of Systems     All systems reviewed and negative except for those discussed in HPI.       PHYSICAL EXAM    I have reviewed the triage vital signs and nursing notes.    ED Triage Vitals [03/02/23 1535]   Temp Heart Rate Resp BP SpO2   97.6 °F (36.4 °C) 103 16 -- 99 %      Temp src Heart Rate Source Patient Position BP Location FiO2 (%)   Tympanic Monitor -- -- --       Physical Exam    Physical Exam   Constitutional: No distress.  Soft spoken.  Not  overtly toxic.  HENT:  Head: Normocephalic and atraumatic.   Oropharynx: Mucous membranes are moist.   Eyes: No scleral icterus. No conjunctival pallor.  Neck: Painless range of motion noted. Neck supple.  No meningismus or rigidity  Cardiovascular: Normal rate, regular rhythm and intact distal pulses.  Pulmonary/Chest: No respiratory distress. There are no wheezes, no rhonchi, and no rales.  No tachypnea or increased work of breathing  Abdominal: Soft. There is very minimal left lower quadrant discomfort with palpation without other focal tenderness. There is no rebound and no guarding.   Musculoskeletal: Moves all extremities equally. There is no pedal edema or calf tenderness.  Patient's focal discomfort is left paraspinal and seems to radiate into her left lower extremity.  Painless range of motion of the left lower extremity is noted with strong dorsalis pedis pulses bilaterally.  Sensation grossly intact bilateral feet with normal range of motion at the foot, ankle, knee and hip.  Neurological: Alert.  Baseline strength and sensation noted.   Skin: Skin is pink, warm, and dry. No pallor.   Psychiatric: Mood and affect normal.   Nursing note and vitals reviewed.    LAB RESULTS  Recent Results (from the past 24 hour(s))   ECG 12 Lead Chest Pain    Collection Time: 03/02/23  1:51 AM   Result Value Ref Range    QT Interval 340 ms   Comprehensive Metabolic Panel    Collection Time: 03/02/23  2:01 AM    Specimen: Blood   Result Value Ref Range    Glucose 101 (H) 65 - 99 mg/dL    BUN 12 6 - 20 mg/dL    Creatinine 0.79 0.57 - 1.00 mg/dL    Sodium 140 136 - 145 mmol/L    Potassium 3.8 3.5 - 5.2 mmol/L    Chloride 107 98 - 107 mmol/L    CO2 20.6 (L) 22.0 - 29.0 mmol/L    Calcium 8.9 8.6 - 10.5 mg/dL    Total Protein 6.9 6.0 - 8.5 g/dL    Albumin 3.8 3.5 - 5.2 g/dL    ALT (SGPT) 19 1 - 33 U/L    AST (SGOT) 12 1 - 32 U/L    Alkaline Phosphatase 70 39 - 117 U/L    Total Bilirubin <0.2 0.0 - 1.2 mg/dL    Globulin 3.1 gm/dL     A/G Ratio 1.2 g/dL    BUN/Creatinine Ratio 15.2 7.0 - 25.0    Anion Gap 12.4 5.0 - 15.0 mmol/L    eGFR 97.7 >60.0 mL/min/1.73   High Sensitivity Troponin T    Collection Time: 03/02/23  2:01 AM    Specimen: Blood   Result Value Ref Range    HS Troponin T <6 <10 ng/L   Green Top (Gel)    Collection Time: 03/02/23  2:01 AM   Result Value Ref Range    Extra Tube Hold for add-ons.    Lavender Top    Collection Time: 03/02/23  2:01 AM   Result Value Ref Range    Extra Tube hold for add-on    Gold Top - SST    Collection Time: 03/02/23  2:01 AM   Result Value Ref Range    Extra Tube Hold for add-ons.    Light Blue Top    Collection Time: 03/02/23  2:01 AM   Result Value Ref Range    Extra Tube Hold for add-ons.    CBC Auto Differential    Collection Time: 03/02/23  2:01 AM    Specimen: Blood   Result Value Ref Range    WBC 6.67 3.40 - 10.80 10*3/mm3    RBC 4.21 3.77 - 5.28 10*6/mm3    Hemoglobin 12.6 12.0 - 15.9 g/dL    Hematocrit 37.4 34.0 - 46.6 %    MCV 88.8 79.0 - 97.0 fL    MCH 29.9 26.6 - 33.0 pg    MCHC 33.7 31.5 - 35.7 g/dL    RDW 13.4 12.3 - 15.4 %    RDW-SD 44.2 37.0 - 54.0 fl    MPV 9.8 6.0 - 12.0 fL    Platelets 315 140 - 450 10*3/mm3    Neutrophil % 57.6 42.7 - 76.0 %    Lymphocyte % 34.2 19.6 - 45.3 %    Monocyte % 4.5 (L) 5.0 - 12.0 %    Eosinophil % 3.0 0.3 - 6.2 %    Basophil % 0.3 0.0 - 1.5 %    Immature Grans % 0.4 0.0 - 0.5 %    Neutrophils, Absolute 3.84 1.70 - 7.00 10*3/mm3    Lymphocytes, Absolute 2.28 0.70 - 3.10 10*3/mm3    Monocytes, Absolute 0.30 0.10 - 0.90 10*3/mm3    Eosinophils, Absolute 0.20 0.00 - 0.40 10*3/mm3    Basophils, Absolute 0.02 0.00 - 0.20 10*3/mm3    Immature Grans, Absolute 0.03 0.00 - 0.05 10*3/mm3    nRBC 0.0 0.0 - 0.2 /100 WBC   hCG, Serum, Qualitative    Collection Time: 03/02/23  2:01 AM    Specimen: Blood   Result Value Ref Range    HCG Qualitative Negative Negative   High Sensitivity Troponin T 2Hr    Collection Time: 03/02/23  4:04 AM    Specimen: Blood   Result  Value Ref Range    HS Troponin T <6 <10 ng/L    Troponin T Delta     ECG 12 Lead Dyspnea    Collection Time: 03/02/23  4:19 PM   Result Value Ref Range    QT Interval 358 ms   COVID-19, FLU A/B, RSV PCR - Swab, Nasopharynx    Collection Time: 03/02/23  4:34 PM    Specimen: Nasopharynx; Swab   Result Value Ref Range    COVID19 Not Detected Not Detected - Ref. Range    Influenza A PCR Not Detected Not Detected    Influenza B PCR Not Detected Not Detected    RSV, PCR Not Detected Not Detected   Urinalysis With Microscopic If Indicated (No Culture) - Urine, Clean Catch    Collection Time: 03/02/23  4:34 PM    Specimen: Urine, Clean Catch   Result Value Ref Range    Color, UA Dark Yellow (A) Yellow, Straw    Appearance, UA Cloudy (A) Clear    pH, UA 7.5 5.0 - 8.0    Specific Gravity, UA 1.028 1.005 - 1.030    Glucose, UA Negative Negative    Ketones, UA Negative Negative    Bilirubin, UA Negative Negative    Blood, UA Negative Negative    Protein, UA Negative Negative    Leuk Esterase, UA Negative Negative    Nitrite, UA Positive (A) Negative    Urobilinogen, UA 0.2 E.U./dL 0.2 - 1.0 E.U./dL   Urinalysis, Microscopic Only - Urine, Clean Catch    Collection Time: 03/02/23  4:34 PM    Specimen: Urine, Clean Catch   Result Value Ref Range    RBC, UA None Seen None Seen, 0-2 /HPF    WBC, UA 0-2 None Seen, 0-2 /HPF    Bacteria, UA 1+ (A) None Seen /HPF    Squamous Epithelial Cells, UA 0-2 None Seen, 0-2 /HPF    Hyaline Casts, UA None Seen None Seen /LPF    Methodology Manual Light Microscopy        Ordered the above labs and independently reviewed the results.        RADIOLOGY  XR Chest 2 View    Result Date: 3/2/2023  Patient: TOSHIA AYERS  Time Out: 02:17 Exam(s): FILM CXR 2 VIEWS EXAM:   XR Chest, 2 Views CLINICAL HISTORY:    Reason for exam: Chest Pain Triage Protocol. TECHNIQUE:   Frontal and lateral views of the chest. COMPARISON: September 14, 2022. FINDINGS:   Lungs:  Unremarkable.  No consolidation.   Pleural  space:  Unremarkable.  No pneumothorax.   Heart:  Unremarkable.  No cardiomegaly.   Mediastinum:  Unremarkable.   Bones joints:  Unremarkable. IMPRESSION:       Normal chest x-rays.     Electronically signed by Galindo Castro D.O. on 03-02-23 at 0217    CT Abdomen Pelvis With Contrast    Result Date: 3/2/2023  Patient: TOSHIA AYERS  Time Out: 05:30 Exam(s): CT ABDOMEN + PELVIS With Contrast EXAM:   CT Abdomen and Pelvis With Intravenous Contrast CLINICAL HISTORY:    Reason for exam: Abdominal pain, acute, nonlocalized. TECHNIQUE:   Axial computed tomography images of the abdomen and pelvis with intravenous contrast.  CTDI is 12.88 mGy and DLP is 619.6 mGy-cm.  This CT exam was performed according to the principle of ALARA (As Low As Reasonably Achievable) by using one or more of the following dose reduction techniques: automated exposure control, adjustment of the mA and or kV according to patient size, and or use of iterative reconstruction technique. COMPARISON:   11 1 2022 FINDINGS:   Lung bases:  Unremarkable.  No mass.  No consolidation.  ABDOMEN:   Liver:  Unremarkable.  No mass.   Gallbladder and bile ducts:  Unremarkable.  No calcified stones.  No ductal dilation.   Pancreas:  Unremarkable.  No mass.  No ductal dilation.   Spleen:  Unremarkable.  No splenomegaly.   Adrenals:  Unremarkable.  No mass.   Kidneys and ureters:  Unremarkable.  No solid mass.  No hydronephrosis.   Stomach and bowel:  Unremarkable.  No obstruction.  No mucosal thickening.  PELVIS:   Appendix:  No findings to suggest acute appendicitis.   Bladder:  Unremarkable.  No mass.   Reproductive: Bilateral ovarian cysts measuring up to 2.6 cm in the right and 2.1 cm on the left.  ABDOMEN and PELVIS:   Intraperitoneal space:  Unremarkable.  No free air.  No significant fluid collection.   Bones joints:  No acute fracture.  No dislocation.   Soft tissues: Small fat containing umbilical hernia.   Vasculature:  Unremarkable.  No abdominal aortic  aneurysm.   Lymph nodes:  Unremarkable.  No enlarged lymph nodes. IMPRESSION:     No acute intra-abdominal process.    Electronically signed by Jose Rain M.D. on 03-02-23 at 0530      I ordered the above noted radiological studies. Reviewed by me and discussed with radiologist.  See dictation for official radiology interpretation.      PROCEDURES    Procedures        MEDICATIONS GIVEN IN ER    Medications   cephalexin (KEFLEX) capsule 1,000 mg (has no administration in time range)   ketorolac (TORADOL) injection 15 mg (15 mg Intramuscular Given 3/2/23 1633)         PROGRESS, DATA ANALYSIS, CONSULTS, AND MEDICAL DECISION MAKING    My differential diagnosis for back pain includes but is not limited to:  Musculoskeletal strain, contusion, retroperitoneal hematoma, disc protrusion, vertebral fracture, transverse process fracture, rib fracture, facet syndrome, sacroiliac joint strain, sciatica, renal injury, splenic injury, pancreatic injury, osteoarthritis, lumbar spondylosis, spinal stenosis, ankylosing spondylitis, sacroiliac joint inflammation, pancreatitis, perforated peptic ulcer, diverticulitis, endometriosis, chronic PID, epidural abscess, osteomyelitis, retroperitoneal abscess, pyelonephritis, pneumonia, subphrenic abscess, tuberculosis, neurofibroma, meningioma, multiple myeloma, lymphoma, metastatic cancer, primary cancer, AAA, aortic dissection, spinal ischemia, referred pain, ureterolithiasis      All labs have been independently reviewed by me.  All radiology studies have been reviewed by me and discussed with radiologist dictating the report.   EKG's independently viewed and interpreted by me.  Discussion below represents my analysis of pertinent findings related to patient's condition, differential diagnosis, treatment plan and final disposition.      ED Course as of 03/02/23 1941   Thu Mar 02, 2023   1621 EKG           EKG time: 69  Rhythm/Rate: Sinus, 80  P waves and HI: JARRED within normal  limits  QRS, axis: Narrow complex  ST and T waves: No STEMI; QTc within normal limits    Interpreted Contemporaneously by me, independently viewed  Comparison: Improved rate as compared to this morning   [RS]   1917 COVID19: Not Detected [RS]   1917 Influenza A PCR: Not Detected [RS]   1917 Influenza B PCR: Not Detected [RS]   1917 Nitrite, UA(!): Positive [RS]   1917 Leukocytes, UA: Negative [RS]   1917 WBC, UA: 0-2 [RS]   1917 Bacteria, UA(!): 1+ [RS]   1917 Squamous Epithelial Cells, UA: 0-2 [RS]      ED Course User Index  [RS] Salbador Barriga MD       AS OF 19:41 EST VITALS:    BP - 128/89  HR - 95  TEMP - 97.6 °F (36.4 °C) (Tympanic)  O2 SATS - 100%        DIAGNOSIS  Final diagnoses:   Back pain of lumbar region with sciatica   Acute UTI         DISPOSITION  DISCHARGE    Patient discharged in stable condition.    Reviewed implications of results, diagnosis, meds, responsibility to follow up, warning signs and symptoms of possible worsening, potential complications and reasons to return to ER.    Patient/Family voiced understanding of above instructions.    Discussed plan for discharge, as there is no emergent indication for admission. Patient referred to primary care provider for regular health maintenance. Pt/family is agreeable and understands need for follow up and possible repeat testing.  Pt is aware that discharge does not mean that nothing is wrong but it indicates no emergency is present that requires admission and they must continue care with follow-up as given below or physician of their choice.     FOLLOW-UP  Servando Gao MD  [address] [place], [place] [id number] [phone]    Call in 1 day  Schedule close outpatient follow-up         Medication List      New Prescriptions    cefuroxime 500 MG tablet  Commonly known as: CEFTIN  Take 1 tablet by mouth 2 (Two) Times a Day.     diclofenac 50 MG EC tablet  Commonly known as: VOLTAREN  Take one tablet by mouth up to 3 times daily as needed for  pain        Changed    topiramate 50 MG tablet  Commonly known as: TOPAMAX  Take 1 tablet by mouth Every Night.  What changed:   · when to take this  · additional instructions           Where to Get Your Medications      These medications were sent to [place], [place] - [address] AT [facility] [address] [facility] - [phone]  - [phone]   [address] [facility] [address]    Phone: [phone]   · cefuroxime 500 MG tablet  · diclofenac 50 MG EC tablet            Salbador Barriga MD  03/02/23 1947

## 2023-03-02 NOTE — ED TRIAGE NOTES
abd pain c nausea.  She was seen for same this am.  She is now soa    Patient was placed in face mask during first look triage.  Patient was wearing a face mask throughout encounter.  I wore personal protective equipment throughout the encounter.  Hand hygiene was performed before and after patient encounter.

## 2023-03-02 NOTE — ED TRIAGE NOTES
"Patient to ED per PV from home, ambulatory to triage, w/ reports of diarrhea and LLQ pain radiating down leg x4 days; HA, chest pain for a \"few days\". Patient states she was diagnosed with a PE in the fall; still on blood thinners. Patient states CP worse w/ movement.    Patient and staff w/ appropriate PPE in place throughout encounter.  "

## 2023-03-02 NOTE — ED PROVIDER NOTES
EMERGENCY DEPARTMENT ENCOUNTER    Room Number:  28/28  Date seen:  3/2/2023  PCP: Servando Gao MD  Historian: Patient      HPI:  Chief Complaint: Abdominal pain  A complete HPI/ROS/PMH/PSH/SH/FH are unobtainable due to: None  Context: Liya Carlson is a 37 y.o. female who presents to the ED c/o left-sided abdominal discomfort that has been present for the past several days.  She reports that the pain became much worse yesterday prompting her emergency room visit.  She describes it as a dull and achy pain in the left side of her abdomen with occasional radiation into her left leg.  She also reports sharp chest centralized discomfort as well as watery diarrhea associated with the symptoms.  She denies nausea/vomiting, fever/chills, back pain, leg weakness, leg numbness, groin numbness, urinary retention, or fecal incontinence.          PAST MEDICAL HISTORY  Active Ambulatory Problems     Diagnosis Date Noted   • Seizures (HCC)    • Pancreatitis    • Gastric ulcer    • Dyslipidemia 03/18/2016   • Environmental allergies 03/18/2016   • Intractable migraine without aura and without status migrainosus 08/08/2017   • Single subsegmental pulmonary embolism without acute cor pulmonale (HCC) 09/08/2022   • Abnormal CT scan, sigmoid colon 09/08/2022   • Menorrhagia with regular cycle 05/08/2018   • Muscle spasms of lower extremity 10/07/2022   • COVID-19 virus infection 01/23/2023   • Abdominal pain 09/23/2015     Resolved Ambulatory Problems     Diagnosis Date Noted   • No Resolved Ambulatory Problems     Past Medical History:   Diagnosis Date   • Chest wall contusion    • Contusion, abdominal wall    • Headache, tension-type    • History of blood clots    • Migraine    • Pulmonary embolism (HCC)          PAST SURGICAL HISTORY  Past Surgical History:   Procedure Laterality Date   • LAPAROSCOPIC TUBAL LIGATION     • SIGMOIDOSCOPY N/A 9/11/2022    Procedure: SIGMOIDOSCOPY FLEXIBLE TO DESCENDING COLON;  Surgeon: Cirilo  Sierra LEO MD;  Location: [facility] ENDOSCOPY;  Service: Gastroenterology;  Laterality: N/A;  PRE- ABDOMINAL PAIN, ABNORMAL CT  POST- SMALL HEMORRHOIDS         FAMILY HISTORY  Family History   Problem Relation Age of Onset   • Diabetes Mother    • Hypertension Mother    • Migraines Mother    • Dementia Mother    • Arthritis Mother    • Kidney disease Mother    • Colon polyps Father    • Hypertension Father    • Diabetes Father    • Seizures Father    • Stroke Father    • Colon cancer Paternal Aunt    • Colon polyps Paternal Aunt    • Colon cancer Paternal Grandmother    • Colon polyps Paternal Grandmother          SOCIAL HISTORY  Social History     Socioeconomic History   • Marital status:    Tobacco Use   • Smoking status: Never   • Smokeless tobacco: Never   Vaping Use   • Vaping Use: Never used   Substance and Sexual Activity   • Alcohol use: Yes     Comment: OCCASIONAL   • Drug use: No   • Sexual activity: Defer         ALLERGIES  Patient has no known allergies.        REVIEW OF SYSTEMS  Review of Systems   Constitutional: Negative for fever.   HENT: Negative for sore throat.    Eyes: Negative.    Respiratory: Negative for cough and shortness of breath.    Cardiovascular: Positive for chest pain.   Gastrointestinal: Positive for abdominal pain and diarrhea. Negative for vomiting.   Genitourinary: Negative for dysuria.   Musculoskeletal: Negative for neck pain.   Skin: Negative for rash.   Allergic/Immunologic: Negative.    Neurological: Negative for weakness, numbness and headaches.   Hematological: Negative.    Psychiatric/Behavioral: Negative.    All other systems reviewed and are negative.         PHYSICAL EXAM  ED Triage Vitals [03/02/23 0149]   Temp Heart Rate Resp BP SpO2   98.1 °F (36.7 °C) (!) 121 18 113/90 97 %      Temp src Heart Rate Source Patient Position BP Location FiO2 (%)   Oral Monitor Standing Left arm --       Physical Exam  Vitals and nursing note reviewed.   Constitutional:       General:  She is not in acute distress.  HENT:      Head: Normocephalic and atraumatic.   Eyes:      Pupils: Pupils are equal, round, and reactive to light.   Cardiovascular:      Rate and Rhythm: Normal rate and regular rhythm.      Heart sounds: Normal heart sounds.   Pulmonary:      Effort: Pulmonary effort is normal. No respiratory distress.      Breath sounds: Normal breath sounds.   Abdominal:      Palpations: Abdomen is soft.      Tenderness: There is abdominal tenderness in the left upper quadrant and left lower quadrant. There is no guarding or rebound.   Musculoskeletal:         General: Normal range of motion.      Cervical back: Normal range of motion and neck supple.   Skin:     General: Skin is warm and dry.      Findings: No rash.   Neurological:      Mental Status: She is alert and oriented to person, place, and time.      Sensory: Sensation is intact.   Psychiatric:         Mood and Affect: Mood and affect normal.       Vital signs and nursing notes reviewed.          LAB RESULTS  Recent Results (from the past 24 hour(s))   ECG 12 Lead Chest Pain    Collection Time: 03/02/23  1:51 AM   Result Value Ref Range    QT Interval 340 ms   Comprehensive Metabolic Panel    Collection Time: 03/02/23  2:01 AM    Specimen: Blood   Result Value Ref Range    Glucose 101 (H) 65 - 99 mg/dL    BUN 12 6 - 20 mg/dL    Creatinine 0.79 0.57 - 1.00 mg/dL    Sodium 140 136 - 145 mmol/L    Potassium 3.8 3.5 - 5.2 mmol/L    Chloride 107 98 - 107 mmol/L    CO2 20.6 (L) 22.0 - 29.0 mmol/L    Calcium 8.9 8.6 - 10.5 mg/dL    Total Protein 6.9 6.0 - 8.5 g/dL    Albumin 3.8 3.5 - 5.2 g/dL    ALT (SGPT) 19 1 - 33 U/L    AST (SGOT) 12 1 - 32 U/L    Alkaline Phosphatase 70 39 - 117 U/L    Total Bilirubin <0.2 0.0 - 1.2 mg/dL    Globulin 3.1 gm/dL    A/G Ratio 1.2 g/dL    BUN/Creatinine Ratio 15.2 7.0 - 25.0    Anion Gap 12.4 5.0 - 15.0 mmol/L    eGFR 97.7 >60.0 mL/min/1.73   High Sensitivity Troponin T    Collection Time: 03/02/23  2:01 AM     Specimen: Blood   Result Value Ref Range    HS Troponin T <6 <10 ng/L   Green Top (Gel)    Collection Time: 03/02/23  2:01 AM   Result Value Ref Range    Extra Tube Hold for add-ons.    Lavender Top    Collection Time: 03/02/23  2:01 AM   Result Value Ref Range    Extra Tube hold for add-on    Gold Top - SST    Collection Time: 03/02/23  2:01 AM   Result Value Ref Range    Extra Tube Hold for add-ons.    Light Blue Top    Collection Time: 03/02/23  2:01 AM   Result Value Ref Range    Extra Tube Hold for add-ons.    CBC Auto Differential    Collection Time: 03/02/23  2:01 AM    Specimen: Blood   Result Value Ref Range    WBC 6.67 3.40 - 10.80 10*3/mm3    RBC 4.21 3.77 - 5.28 10*6/mm3    Hemoglobin 12.6 12.0 - 15.9 g/dL    Hematocrit 37.4 34.0 - 46.6 %    MCV 88.8 79.0 - 97.0 fL    MCH 29.9 26.6 - 33.0 pg    MCHC 33.7 31.5 - 35.7 g/dL    RDW 13.4 12.3 - 15.4 %    RDW-SD 44.2 37.0 - 54.0 fl    MPV 9.8 6.0 - 12.0 fL    Platelets 315 140 - 450 10*3/mm3    Neutrophil % 57.6 42.7 - 76.0 %    Lymphocyte % 34.2 19.6 - 45.3 %    Monocyte % 4.5 (L) 5.0 - 12.0 %    Eosinophil % 3.0 0.3 - 6.2 %    Basophil % 0.3 0.0 - 1.5 %    Immature Grans % 0.4 0.0 - 0.5 %    Neutrophils, Absolute 3.84 1.70 - 7.00 10*3/mm3    Lymphocytes, Absolute 2.28 0.70 - 3.10 10*3/mm3    Monocytes, Absolute 0.30 0.10 - 0.90 10*3/mm3    Eosinophils, Absolute 0.20 0.00 - 0.40 10*3/mm3    Basophils, Absolute 0.02 0.00 - 0.20 10*3/mm3    Immature Grans, Absolute 0.03 0.00 - 0.05 10*3/mm3    nRBC 0.0 0.0 - 0.2 /100 WBC   hCG, Serum, Qualitative    Collection Time: 03/02/23  2:01 AM    Specimen: Blood   Result Value Ref Range    HCG Qualitative Negative Negative   High Sensitivity Troponin T 2Hr    Collection Time: 03/02/23  4:04 AM    Specimen: Blood   Result Value Ref Range    HS Troponin T <6 <10 ng/L    Troponin T Delta         Ordered the above labs and reviewed the results.        RADIOLOGY  XR Chest 2 View    Result Date: 3/2/2023  Patient: ANDRIA,  TOSHIA  Time Out: 02:17 Exam(s): FILM CXR 2 VIEWS EXAM:   XR Chest, 2 Views CLINICAL HISTORY:    Reason for exam: Chest Pain Triage Protocol. TECHNIQUE:   Frontal and lateral views of the chest. COMPARISON: September 14, 2022. FINDINGS:   Lungs:  Unremarkable.  No consolidation.   Pleural space:  Unremarkable.  No pneumothorax.   Heart:  Unremarkable.  No cardiomegaly.   Mediastinum:  Unremarkable.   Bones joints:  Unremarkable. IMPRESSION:       Normal chest x-rays.     Electronically signed by Galindo Castro D.O. on 03-02-23 at 0217    CT Abdomen Pelvis With Contrast    Result Date: 3/2/2023  Patient: TOSHIA AYERS  Time Out: 05:30 Exam(s): CT ABDOMEN + PELVIS With Contrast EXAM:   CT Abdomen and Pelvis With Intravenous Contrast CLINICAL HISTORY:    Reason for exam: Abdominal pain, acute, nonlocalized. TECHNIQUE:   Axial computed tomography images of the abdomen and pelvis with intravenous contrast.  CTDI is 12.88 mGy and DLP is 619.6 mGy-cm.  This CT exam was performed according to the principle of ALARA (As Low As Reasonably Achievable) by using one or more of the following dose reduction techniques: automated exposure control, adjustment of the mA and or kV according to patient size, and or use of iterative reconstruction technique. COMPARISON:   11 1 2022 FINDINGS:   Lung bases:  Unremarkable.  No mass.  No consolidation.  ABDOMEN:   Liver:  Unremarkable.  No mass.   Gallbladder and bile ducts:  Unremarkable.  No calcified stones.  No ductal dilation.   Pancreas:  Unremarkable.  No mass.  No ductal dilation.   Spleen:  Unremarkable.  No splenomegaly.   Adrenals:  Unremarkable.  No mass.   Kidneys and ureters:  Unremarkable.  No solid mass.  No hydronephrosis.   Stomach and bowel:  Unremarkable.  No obstruction.  No mucosal thickening.  PELVIS:   Appendix:  No findings to suggest acute appendicitis.   Bladder:  Unremarkable.  No mass.   Reproductive: Bilateral ovarian cysts measuring up to 2.6 cm in the right  and 2.1 cm on the left.  ABDOMEN and PELVIS:   Intraperitoneal space:  Unremarkable.  No free air.  No significant fluid collection.   Bones joints:  No acute fracture.  No dislocation.   Soft tissues: Small fat containing umbilical hernia.   Vasculature:  Unremarkable.  No abdominal aortic aneurysm.   Lymph nodes:  Unremarkable.  No enlarged lymph nodes. IMPRESSION:     No acute intra-abdominal process.    Electronically signed by Jose Rain M.D. on 03-02-23 at 0530      Ordered the above noted radiological studies. Reviewed by me in PACS.            PROCEDURES  Procedures    EKG          EKG time: 0151  Rhythm/Rate: NSR, 94  P waves and UT: nml  QRS, axis: nml, nml   ST and T waves: nml     Interpreted Contemporaneously by me, independently viewed  unchanged compared to prior 9/14/22            MEDICATIONS GIVEN IN ER  Medications   sodium chloride 0.9 % flush 10 mL (has no administration in time range)   sodium chloride 0.9 % bolus 1,000 mL (0 mL Intravenous Stopped 3/2/23 0612)   ondansetron (ZOFRAN) injection 4 mg (4 mg Intravenous Given 3/2/23 0401)   HYDROmorphone (DILAUDID) injection 0.5 mg (0.5 mg Intravenous Given 3/2/23 0401)   iopamidol (ISOVUE-300) 61 % injection 100 mL (85 mL Intravenous Given 3/2/23 0429)   ondansetron (ZOFRAN) injection 4 mg (4 mg Intravenous Given 3/2/23 0617)   HYDROmorphone (DILAUDID) injection 0.25 mg (0.25 mg Intravenous Given 3/2/23 0617)                   MEDICAL DECISION MAKING, PROGRESS, and CONSULTS    All labs have been independently reviewed by me.  All radiology studies have been reviewed by me and I have also reviewed the radiology report.   EKG's independently viewed and interpreted by me.  Discussion below represents my analysis of pertinent findings related to patient's condition, differential diagnosis, treatment plan and final disposition.      Additional sources:  - Discussed/ obtained information from independent historians: History obtained from the patient  at bedside    - External (non-ED) record review: Upon medical records review, the patient was admitted to the hospital on 9/8/2022 through 9/12/2022 where she was treated for a single subsegmental pulmonary embolism without right heart strain.    - Chronic or social conditions impacting care: Anticoagulation dependent from previous PE    - Shared decision making: Discharge decision based on shared conversations had between myself as well as the patient at bedside.        Orders placed during this visit:  Orders Placed This Encounter   Procedures   • XR Chest 2 View   • CT Abdomen Pelvis With Contrast   • [place] Draw   • Comprehensive Metabolic Panel   • High Sensitivity Troponin T   • CBC Auto Differential   • High Sensitivity Troponin T 2Hr   • hCG, Serum, Qualitative   • NPO Diet NPO Type: Strict NPO   • Undress and Gown   • Cardiac Monitoring   • Continuous Pulse Oximetry   • Oxygen Therapy- Nasal Cannula; 2 LPM; Titrate for SPO2: equal to or greater than, 92%   • ECG 12 Lead Chest Pain   • ECG 12 Lead ED Triage Standing Order; Chest Pain   • Insert Peripheral IV   • CBC & Differential   • Green Top (Gel)   • Lavender Top   • Gold Top - SST   • Light Blue Top             Differential diagnosis:    Differential diagnosis includes but is not limited to acute colitis, acute diverticulitis, acute gastroenteritis, small bowel obstruction, acute coronary syndrome, pleurisy, pericarditis, pneumonia, or pneumothorax.        Independent interpretation of labs, radiology studies, and discussions with consultants:    CT scan of the abdomen and pelvis independently interpreted by myself showing no intraperitoneal free air, obstruction, or signs of colonic abnormality or inflammation.      ED Course as of 03/02/23 0647   Thu Mar 02, 2023   0610 On reevaluation, the patient is resting fairly comfortably but continues to complain of abdominal discomfort.  I did inform her that her work-up in the ED is fully unremarkable  including 2 negative cardiac enzymes as well as a unremarkable CT abdomen/pelvis.  At this point, the patient will be stable for discharge and all questions have been answered. [BM]      ED Course User Index  [BM] Erik Chakraborty MD         The patient was wearing a facemask upon entrance into the room and remained in such throughout their visit.  I was wearing PPE including a facemask, eye protection, as well as gloves at any point entering the room and throughout the visit      DIAGNOSIS  Final diagnoses:   Left lower quadrant abdominal pain   Diarrhea, unspecified type   Atypical chest pain         DISPOSITION  DISCHARGE    Patient discharged in stable condition.    Reviewed implications of results, diagnosis, meds, responsibility to follow up, warning signs and symptoms of possible worsening, potential complications and reasons to return to ER.    Patient/Family voiced understanding of above instructions.    Discussed plan for discharge, as there is no emergent indication for admission. Patient referred to primary care provider for BP management due to today's BP. Pt/family is agreeable and understands need for follow up and repeat testing.  Pt is aware that discharge does not mean that nothing is wrong but it indicates no emergency is present that requires admission and they must continue care with follow-up as given below or physician of their choice.     FOLLOW-UP  Servadno Gao MD  [address] [place], [place] [id number] [phone]    Schedule an appointment as soon as possible for a visit            Medication List      New Prescriptions    ondansetron ODT 8 MG disintegrating tablet  Commonly known as: ZOFRAN-ODT  Place 1 tablet under the tongue Every 8 (Eight) Hours As Needed for Nausea or Vomiting.        Changed    topiramate 50 MG tablet  Commonly known as: TOPAMAX  Take 1 tablet by mouth Every Night.  What changed:   · when to take this  · additional instructions           Where to Get  Your Medications      You can get these medications from any pharmacy    Bring a paper prescription for each of these medications  · ondansetron ODT 8 MG disintegrating tablet                   Latest Documented Vital Signs:  As of 06:47 EST  BP- 137/94 HR- 91 Temp- 98.1 °F (36.7 °C) (Oral) O2 sat- 96%              --    Please note that portions of this were completed with a voice recognition program.       Note Disclaimer: At [facility], we believe that sharing information builds trust and better relationships. You are receiving this note because you are receiving care at [facility] or recently visited. It is possible you will see health information before a provider has talked with you about it. This kind of information can be easy to misunderstand. To help you fully understand what it means for your health, we urge you to discuss this note with your provider.             Erik Chakraborty MD  03/02/23 0639

## 2023-03-13 DIAGNOSIS — Z00.00 ENCOUNTER FOR GENERAL ADULT MEDICAL EXAMINATION WITHOUT ABNORMAL FINDINGS: ICD-10-CM

## 2023-03-13 DIAGNOSIS — Z00.00 PHYSICAL EXAM, ANNUAL: ICD-10-CM

## 2023-03-13 RX ORDER — ZOLPIDEM TARTRATE 10 MG/1
TABLET ORAL
Qty: 90 TABLET | Refills: 3 | Status: SHIPPED | OUTPATIENT
Start: 2023-03-13

## 2023-03-13 RX ORDER — TIZANIDINE 4 MG/1
4 TABLET ORAL EVERY 8 HOURS PRN
Qty: 90 TABLET | Refills: 4 | Status: SHIPPED | OUTPATIENT
Start: 2023-03-13

## 2023-03-23 DIAGNOSIS — N70.11 HYDROSALPINX: ICD-10-CM

## 2023-03-23 RX ORDER — HYDROCODONE BITARTRATE AND ACETAMINOPHEN 5; 325 MG/1; MG/1
1 TABLET ORAL EVERY 4 HOURS PRN
Qty: 20 TABLET | Refills: 0 | Status: SHIPPED | OUTPATIENT
Start: 2023-03-23 | End: 2023-03-29

## 2023-03-29 DIAGNOSIS — N70.11 HYDROSALPINX: ICD-10-CM

## 2023-03-29 RX ORDER — HYDROCODONE BITARTRATE AND ACETAMINOPHEN 5; 325 MG/1; MG/1
1 TABLET ORAL EVERY 4 HOURS PRN
Qty: 20 TABLET | Refills: 0 | Status: SHIPPED | OUTPATIENT
Start: 2023-03-29 | End: 2023-04-06

## 2023-04-06 DIAGNOSIS — N70.11 HYDROSALPINX: ICD-10-CM

## 2023-04-06 RX ORDER — HYDROCODONE BITARTRATE AND ACETAMINOPHEN 5; 325 MG/1; MG/1
TABLET ORAL
Qty: 20 TABLET | Refills: 0 | Status: SHIPPED | OUTPATIENT
Start: 2023-04-06

## 2023-04-17 ENCOUNTER — TELEPHONE (OUTPATIENT)
Dept: FAMILY MEDICINE CLINIC | Facility: CLINIC | Age: 38
End: 2023-04-17
Payer: COMMERCIAL

## 2023-04-17 NOTE — TELEPHONE ENCOUNTER
Caller: Liya Carlson    Relationship: Self    Best call back number: [phone]    What is the best time to reach you: ANY     Who are you requesting to speak with (clinical staff, provider,  specific staff member): DR JI     What was the call regarding: NEEDS HOSPITAL FOLLOW UP WITH DR JI TOMORROW 4/18/23-SEEN AT [facility] FOR CHEST PAIN, BACK PAIN AND ABDOMINAL PAIN.     Do you require a callback: YES

## 2023-04-17 NOTE — TELEPHONE ENCOUNTER
Patient just got out of er and is looking (for a new patient er f/u )because symptoms not resolved Advise what to do? Shannon said to let her know what is decided and she will make appt. But wants to know what you say first?

## 2023-04-20 NOTE — TELEPHONE ENCOUNTER
Patient needs a phone or video encounter   Patient: Schuyler Brandon    YOB: 1972    Date: 04/21/2023    Primary Care Provider: Leah Buckner APRN    Chief Complaint   Patient presents with   • Colonoscopy   • Anemia       SUBJECTIVE:    History of present illness: Patient referred for colonoscopy.  Apparently he was recently found to have anemia.  He has no GI complaints.  No black or bloody bowel movements.  A year and a half ago he had a Cologuard which was positive.  Never had a colonoscopy in the past.    The following portions of the patient's history were reviewed and updated as appropriate: allergies, current medications, past family history, past medical history, past social history, past surgical history and problem list.      Review of Systems   Constitutional: Negative for activity change, chills, fever and unexpected weight change.   HENT: Negative for hearing loss, trouble swallowing and voice change.    Eyes: Negative for visual disturbance.   Respiratory: Negative for apnea, cough, chest tightness, shortness of breath and wheezing.    Cardiovascular: Negative for chest pain, palpitations and leg swelling.   Gastrointestinal: Negative for abdominal distention, abdominal pain, anal bleeding, blood in stool, constipation, diarrhea, nausea, rectal pain and vomiting.   Endocrine: Negative for cold intolerance and heat intolerance.   Genitourinary: Negative for difficulty urinating, dysuria and flank pain.   Musculoskeletal: Negative for back pain and gait problem.   Skin: Negative for color change, rash and wound.   Neurological: Negative for dizziness, syncope, speech difficulty, weakness, light-headedness, numbness and headaches.   Hematological: Negative for adenopathy. Does not bruise/bleed easily.   Psychiatric/Behavioral: Negative for confusion. The patient is not nervous/anxious.        Allergies:  No Known Allergies    Medications:    Current Outpatient Medications:   •  amLODIPine (NORVASC) 10 MG tablet, Take 1 tablet  by mouth Daily., Disp: , Rfl:   •  aspirin 81 MG tablet, Take 1 tablet by mouth Daily., Disp: , Rfl:   •  colchicine 0.6 MG tablet, Take 1 tablet by mouth Daily., Disp: , Rfl:   •  glipizide (GLUCOTROL) 5 MG tablet, Take 1 tablet by mouth 2 (Two) Times a Day Before Meals., Disp: , Rfl:   •  hydroCHLOROthiazide (HYDRODIURIL) 25 MG tablet, Take 1 tablet by mouth Daily., Disp: , Rfl:   •  indomethacin SR (INDOCIN SR) 75 MG CR capsule, Take 1 capsule by mouth 2 (Two) Times a Day With Meals., Disp: , Rfl:   •  Ixekizumab 80 MG/ML solution auto-injector, Inject  under the skin., Disp: , Rfl:   •  losartan (COZAAR) 25 MG tablet, Take 4 tablets by mouth Daily., Disp: , Rfl:   •  metFORMIN (GLUCOPHAGE) 500 MG tablet, Take 1 tablet by mouth 2 (Two) Times a Day With Meals., Disp: , Rfl:   •  PARoxetine (PAXIL) 10 MG tablet, Take 6 tablets by mouth every night at bedtime., Disp: , Rfl:     History:  Past Medical History:   Diagnosis Date   • Anxiety    • Back pain    • Bronchitis    • Depression    • Diabetes mellitus     dx: 2017, doesn't check bs, last a1c 5.8   • Hypertension    • Mood disorder    • Psoriasis        Past Surgical History:   Procedure Laterality Date   • CHOLECYSTECTOMY     • CYST REMOVAL      removed from back as teen   • LUMBAR DISCECTOMY Right 9/20/2019    Procedure: LUMBAR DISCECTOMY, LAMINECTOMY  L4-5 RIGHT;  Surgeon: Francois Merida MD;  Location: [facility];  Service: Neurosurgery   • TOE AMPUTATION       accident left great toe        Family History   Problem Relation Age of Onset   • Obesity Mother    • Diabetes Father    • Hypertension Father    • Obesity Father    • Thyroid disease Sister    • Arthritis Maternal Grandfather    • Heart attack Paternal Grandmother        Social History     Tobacco Use   • Smoking status: Never   • Smokeless tobacco: Never   Vaping Use   • Vaping Use: Never used   Substance Use Topics   • Alcohol use: No   • Drug use: No        OBJECTIVE:    Vital Signs:  "  Vitals:    04/21/23 1049   BP: 138/82   Pulse: 86   Temp: 97.6 °F (36.4 °C)   SpO2: 97%   Weight: (!) 138 kg (304 lb 3.2 oz)   Height: 177.8 cm (70\")       Physical Exam:   General Appearance:    Alert, cooperative, in no acute distress   Head:    Normocephalic, without obvious abnormality, atraumatic   Eyes:            Normal.  No scleral icterus.  PERRLA    Lungs:     Clear to auscultation,respirations regular, even and                  unlabored    Heart:    Regular rhythm and normal rate, normal S1 and S2, no            murmur   Abdomen:     Normal bowel sounds, no masses, no organomegaly, soft        non-tender, non-distended, no guarding,    Extremities:   Moves all extremities well, no edema, no cyanosis, no             redness   Neurologic:   Normal without gross deficits.   Psychiatric: No evidence of depression or anxiety        Results Review:   I reviewed the patient's new clinical results.  Labs were reviewed    Review of Systems was reviewed and confirmed as accurate as documented by the MA.    ASSESSMENT/PLAN:    1. Anemia, unspecified type    2. Positive colorectal cancer screening using Cologuard test        I will perform anemia work-up.  If he is iron deficient I will also recommend an EGD.  In the meantime I recommend a colonoscopy with possible EGD.  I explained the procedure to the patient as well as the risks of bleeding and perforation and they understand the ramifications of these potential complications and they wish to proceed.        Electronically signed by Rolf Krause MD  04/21/23          "

## 2023-05-04 ENCOUNTER — HOSPITAL ENCOUNTER (EMERGENCY)
Facility: HOSPITAL | Age: 38
Discharge: HOME | End: 2023-05-04
Attending: EMERGENCY MEDICINE
Payer: COMMERCIAL

## 2023-05-04 ENCOUNTER — APPOINTMENT (OUTPATIENT)
Dept: CT IMAGING | Facility: HOSPITAL | Age: 38
End: 2023-05-04
Payer: COMMERCIAL

## 2023-05-04 VITALS
RESPIRATION RATE: 17 BRPM | SYSTOLIC BLOOD PRESSURE: 150 MMHG | TEMPERATURE: 97.6 F | HEIGHT: 67 IN | BODY MASS INDEX: 27.62 KG/M2 | DIASTOLIC BLOOD PRESSURE: 84 MMHG | HEART RATE: 87 BPM | OXYGEN SATURATION: 100 % | WEIGHT: 176 LBS

## 2023-05-04 DIAGNOSIS — G44.319 ACUTE POST-TRAUMATIC HEADACHE, NOT INTRACTABLE: Primary | ICD-10-CM
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
## 2023-05-04 DIAGNOSIS — Z79.01 CHRONIC ANTICOAGULATION: ICD-10-CM

## 2023-05-04 DIAGNOSIS — S16.1XXA ACUTE CERVICAL MYOFASCIAL STRAIN, INITIAL ENCOUNTER: ICD-10-CM

## 2023-05-04 PROCEDURE — 99283 EMERGENCY DEPT VISIT LOW MDM: CPT

## 2023-05-04 PROCEDURE — 70450 CT HEAD/BRAIN W/O DYE: CPT

## 2023-05-04 PROCEDURE — 72125 CT NECK SPINE W/O DYE: CPT

## 2023-05-04 PROCEDURE — 63710000001 ONDANSETRON ODT 4 MG TABLET DISPERSIBLE: Performed by: EMERGENCY MEDICINE

## 2023-05-04 RX ORDER — ONDANSETRON 4 MG/1
4 TABLET, ORALLY DISINTEGRATING ORAL ONCE
Status: COMPLETED | OUTPATIENT
Start: 2023-05-04 | End: 2023-05-04

## 2023-05-04 RX ORDER — ATOGEPANT 60 MG/1
60 TABLET ORAL DAILY
COMMUNITY

## 2023-05-04 RX ORDER — ACETAMINOPHEN 500 MG
1000 TABLET ORAL ONCE
Status: COMPLETED | OUTPATIENT
Start: 2023-05-04 | End: 2023-05-04

## 2023-05-04 RX ADMIN — ACETAMINOPHEN 1000 MG: 500 TABLET ORAL at 20:04

## 2023-05-04 RX ADMIN — ONDANSETRON 4 MG: 4 TABLET, ORALLY DISINTEGRATING ORAL at 20:04
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
## 2023-06-08 NOTE — PROGRESS NOTES
ECG 12 Lead    Date/Time: 9/1/2022 11:37 AM  Performed by: Servando Gao MD  Authorized by: Servando Gao MD   Rhythm: sinus tachycardia  Rate: normal  Conduction: conduction normal  ST Segments: ST segments normal  T Waves: T waves normal  QRS axis: normal  Other: no other findings    Clinical impression: abnormal EKG  Comments: EKG Interpretation Report    Heart rate:    102 beats/min, [place] interval:  154 msec, QRS duration:  80 msec  QTu:315 msec, QTc:  374 msec             Awake

## 2024-04-09 ENCOUNTER — HOSPITAL ENCOUNTER (INPATIENT)
Facility: HOSPITAL | Age: 39
LOS: 2 days | Discharge: HOME | DRG: 392 | End: 2024-04-12
Attending: EMERGENCY MEDICINE | Admitting: INTERNAL MEDICINE
Payer: COMMERCIAL

## 2024-04-09 ENCOUNTER — APPOINTMENT (OUTPATIENT)
Dept: GENERAL RADIOLOGY | Facility: HOSPITAL | Age: 39
DRG: 392 | End: 2024-04-09
Payer: COMMERCIAL

## 2024-04-09 DIAGNOSIS — K62.5 RECTAL BLEEDING: ICD-10-CM

## 2024-04-09 DIAGNOSIS — R07.9 CHEST PAIN, UNSPECIFIED TYPE: Primary | ICD-10-CM

## 2024-04-09 DIAGNOSIS — R10.84 GENERALIZED ABDOMINAL PAIN: ICD-10-CM

## 2024-04-09 DIAGNOSIS — D68.9 COAGULOPATHY: ICD-10-CM

## 2024-04-09 DIAGNOSIS — R13.19 ESOPHAGEAL DYSPHAGIA: ICD-10-CM

## 2024-04-09 DIAGNOSIS — K62.5 RECTAL BLEED: ICD-10-CM

## 2024-04-09 DIAGNOSIS — R13.10 DYSPHAGIA, UNSPECIFIED TYPE: ICD-10-CM

## 2024-04-09 LAB
ALBUMIN SERPL-MCNC: 3.9 G/DL (ref 3.5–5.2)
ALBUMIN/GLOB SERPL: 1.3 G/DL
ALP SERPL-CCNC: 69 U/L (ref 39–117)
ALT SERPL W P-5'-P-CCNC: 32 U/L (ref 1–33)
ANION GAP SERPL CALCULATED.3IONS-SCNC: 10 MMOL/L (ref 5–15)
AST SERPL-CCNC: 19 U/L (ref 1–32)
BASOPHILS # BLD AUTO: 0.02 10*3/MM3 (ref 0–0.2)
BASOPHILS NFR BLD AUTO: 0.3 % (ref 0–1.5)
BILIRUB SERPL-MCNC: 0.2 MG/DL (ref 0–1.2)
BUN SERPL-MCNC: 9 MG/DL (ref 6–20)
BUN/CREAT SERPL: 9.7 (ref 7–25)
CALCIUM SPEC-SCNC: 9.1 MG/DL (ref 8.6–10.5)
CHLORIDE SERPL-SCNC: 102 MMOL/L (ref 98–107)
CO2 SERPL-SCNC: 25 MMOL/L (ref 22–29)
CREAT SERPL-MCNC: 0.93 MG/DL (ref 0.57–1)
DEPRECATED RDW RBC AUTO: 40 FL (ref 37–54)
EGFRCR SERPLBLD CKD-EPI 2021: 79.8 ML/MIN/1.73
EOSINOPHIL # BLD AUTO: 0.16 10*3/MM3 (ref 0–0.4)
EOSINOPHIL NFR BLD AUTO: 2.2 % (ref 0.3–6.2)
ERYTHROCYTE [DISTWIDTH] IN BLOOD BY AUTOMATED COUNT: 13.1 % (ref 12.3–15.4)
EXPIRATION DATE: NORMAL
FECAL OCCULT BLOOD SCREEN, POC: NEGATIVE
GLOBULIN UR ELPH-MCNC: 2.9 GM/DL
GLUCOSE SERPL-MCNC: 108 MG/DL (ref 65–99)
HCT VFR BLD AUTO: 35.9 % (ref 34–46.6)
HGB BLD-MCNC: 11.9 G/DL (ref 12–15.9)
IMM GRANULOCYTES # BLD AUTO: 0.05 10*3/MM3 (ref 0–0.05)
IMM GRANULOCYTES NFR BLD AUTO: 0.7 % (ref 0–0.5)
LYMPHOCYTES # BLD AUTO: 1.88 10*3/MM3 (ref 0.7–3.1)
LYMPHOCYTES NFR BLD AUTO: 26.2 % (ref 19.6–45.3)
Lab: 230
MCH RBC QN AUTO: 28 PG (ref 26.6–33)
MCHC RBC AUTO-ENTMCNC: 33.1 G/DL (ref 31.5–35.7)
MCV RBC AUTO: 84.5 FL (ref 79–97)
MONOCYTES # BLD AUTO: 0.37 10*3/MM3 (ref 0.1–0.9)
MONOCYTES NFR BLD AUTO: 5.2 % (ref 5–12)
NEGATIVE CONTROL: NEGATIVE
NEUTROPHILS NFR BLD AUTO: 4.7 10*3/MM3 (ref 1.7–7)
NEUTROPHILS NFR BLD AUTO: 65.4 % (ref 42.7–76)
NRBC BLD AUTO-RTO: 0 /100 WBC (ref 0–0.2)
PLATELET # BLD AUTO: 365 10*3/MM3 (ref 140–450)
PMV BLD AUTO: 9.6 FL (ref 6–12)
POSITIVE CONTROL: POSITIVE
POTASSIUM SERPL-SCNC: 3.9 MMOL/L (ref 3.5–5.2)
PROT SERPL-MCNC: 6.8 G/DL (ref 6–8.5)
RBC # BLD AUTO: 4.25 10*6/MM3 (ref 3.77–5.28)
SODIUM SERPL-SCNC: 137 MMOL/L (ref 136–145)
TROPONIN T SERPL HS-MCNC: <6 NG/L
WBC NRBC COR # BLD AUTO: 7.18 10*3/MM3 (ref 3.4–10.8)

## 2024-04-09 PROCEDURE — 25010000002 ONDANSETRON PER 1 MG: Performed by: EMERGENCY MEDICINE

## 2024-04-09 PROCEDURE — 83690 ASSAY OF LIPASE: CPT | Performed by: EMERGENCY MEDICINE

## 2024-04-09 PROCEDURE — 84484 ASSAY OF TROPONIN QUANT: CPT | Performed by: EMERGENCY MEDICINE

## 2024-04-09 PROCEDURE — 82270 OCCULT BLOOD FECES: CPT | Performed by: EMERGENCY MEDICINE

## 2024-04-09 PROCEDURE — 96375 TX/PRO/DX INJ NEW DRUG ADDON: CPT

## 2024-04-09 PROCEDURE — 83880 ASSAY OF NATRIURETIC PEPTIDE: CPT | Performed by: EMERGENCY MEDICINE

## 2024-04-09 PROCEDURE — 71045 X-RAY EXAM CHEST 1 VIEW: CPT

## 2024-04-09 PROCEDURE — 85025 COMPLETE CBC W/AUTO DIFF WBC: CPT | Performed by: EMERGENCY MEDICINE

## 2024-04-09 PROCEDURE — 93005 ELECTROCARDIOGRAM TRACING: CPT

## 2024-04-09 PROCEDURE — 93010 ELECTROCARDIOGRAM REPORT: CPT | Performed by: INTERNAL MEDICINE

## 2024-04-09 PROCEDURE — 99285 EMERGENCY DEPT VISIT HI MDM: CPT

## 2024-04-09 PROCEDURE — 25010000002 FENTANYL CITRATE (PF) 50 MCG/ML SOLUTION: Performed by: EMERGENCY MEDICINE

## 2024-04-09 PROCEDURE — 93005 ELECTROCARDIOGRAM TRACING: CPT | Performed by: EMERGENCY MEDICINE

## 2024-04-09 PROCEDURE — 96374 THER/PROPH/DIAG INJ IV PUSH: CPT

## 2024-04-09 PROCEDURE — 80053 COMPREHEN METABOLIC PANEL: CPT | Performed by: EMERGENCY MEDICINE

## 2024-04-09 RX ORDER — SODIUM CHLORIDE 0.9 % (FLUSH) 0.9 %
10 SYRINGE (ML) INJECTION AS NEEDED
Status: DISCONTINUED | OUTPATIENT
Start: 2024-04-09 | End: 2024-04-12 | Stop reason: HOSPADM

## 2024-04-09 RX ORDER — ONDANSETRON 2 MG/ML
4 INJECTION INTRAMUSCULAR; INTRAVENOUS ONCE
Status: COMPLETED | OUTPATIENT
Start: 2024-04-09 | End: 2024-04-09

## 2024-04-09 RX ORDER — ASPIRIN 325 MG
325 TABLET ORAL ONCE
Status: DISCONTINUED | OUTPATIENT
Start: 2024-04-09 | End: 2024-04-10

## 2024-04-09 RX ORDER — FENTANYL CITRATE 50 UG/ML
50 INJECTION, SOLUTION INTRAMUSCULAR; INTRAVENOUS ONCE
Status: COMPLETED | OUTPATIENT
Start: 2024-04-09 | End: 2024-04-09

## 2024-04-09 RX ADMIN — ONDANSETRON 4 MG: 2 INJECTION INTRAMUSCULAR; INTRAVENOUS at 23:30

## 2024-04-09 RX ADMIN — FENTANYL CITRATE 50 MCG: 50 INJECTION, SOLUTION INTRAMUSCULAR; INTRAVENOUS at 23:30

## 2024-04-10 ENCOUNTER — APPOINTMENT (OUTPATIENT)
Dept: CT IMAGING | Facility: HOSPITAL | Age: 39
DRG: 392 | End: 2024-04-10
Payer: COMMERCIAL

## 2024-04-10 PROBLEM — Z86.711 HISTORY OF PULMONARY EMBOLISM: Status: ACTIVE | Noted: 2024-04-10

## 2024-04-10 PROBLEM — R13.10 DYSPHAGIA: Status: ACTIVE | Noted: 2024-04-10

## 2024-04-10 PROBLEM — R07.9 CHEST PAIN: Status: ACTIVE | Noted: 2024-04-10

## 2024-04-10 PROBLEM — D68.51 FACTOR V LEIDEN: Status: ACTIVE | Noted: 2024-04-10

## 2024-04-10 PROBLEM — Z79.01 CHRONIC ANTICOAGULATION: Status: ACTIVE | Noted: 2024-04-10

## 2024-04-10 PROBLEM — K62.5 RECTAL BLEEDING: Status: ACTIVE | Noted: 2024-04-10

## 2024-04-10 LAB
ANION GAP SERPL CALCULATED.3IONS-SCNC: 10 MMOL/L (ref 5–15)
BUN SERPL-MCNC: 8 MG/DL (ref 6–20)
BUN/CREAT SERPL: 10.5 (ref 7–25)
CALCIUM SPEC-SCNC: 9.1 MG/DL (ref 8.6–10.5)
CHLORIDE SERPL-SCNC: 101 MMOL/L (ref 98–107)
CO2 SERPL-SCNC: 25 MMOL/L (ref 22–29)
CREAT SERPL-MCNC: 0.76 MG/DL (ref 0.57–1)
D DIMER PPP FEU-MCNC: <0.27 MCGFEU/ML (ref 0–0.5)
DEPRECATED RDW RBC AUTO: 41.8 FL (ref 37–54)
EGFRCR SERPLBLD CKD-EPI 2021: 101.7 ML/MIN/1.73
ERYTHROCYTE [DISTWIDTH] IN BLOOD BY AUTOMATED COUNT: 13.3 % (ref 12.3–15.4)
GEN 5 2HR TROPONIN T REFLEX: <6 NG/L
GLUCOSE SERPL-MCNC: 104 MG/DL (ref 65–99)
HCT VFR BLD AUTO: 35 % (ref 34–46.6)
HCT VFR BLD AUTO: 36.9 % (ref 34–46.6)
HGB BLD-MCNC: 11.5 G/DL (ref 12–15.9)
HGB BLD-MCNC: 12.2 G/DL (ref 12–15.9)
HOLD SPECIMEN: NORMAL
HOLD SPECIMEN: NORMAL
LIPASE SERPL-CCNC: 16 U/L (ref 13–60)
MCH RBC QN AUTO: 28.8 PG (ref 26.6–33)
MCHC RBC AUTO-ENTMCNC: 33.1 G/DL (ref 31.5–35.7)
MCV RBC AUTO: 87.2 FL (ref 79–97)
NT-PROBNP SERPL-MCNC: 49.6 PG/ML (ref 0–450)
PLATELET # BLD AUTO: 356 10*3/MM3 (ref 140–450)
PMV BLD AUTO: 10 FL (ref 6–12)
POTASSIUM SERPL-SCNC: 3.7 MMOL/L (ref 3.5–5.2)
QT INTERVAL: 395 MS
QTC INTERVAL: 421 MS
RBC # BLD AUTO: 4.23 10*6/MM3 (ref 3.77–5.28)
SODIUM SERPL-SCNC: 136 MMOL/L (ref 136–145)
TROPONIN T DELTA: NORMAL
TROPONIN T SERPL HS-MCNC: <6 NG/L
WBC NRBC COR # BLD AUTO: 6.69 10*3/MM3 (ref 3.4–10.8)
WHOLE BLOOD HOLD COAG: NORMAL
WHOLE BLOOD HOLD SPECIMEN: NORMAL

## 2024-04-10 PROCEDURE — 84484 ASSAY OF TROPONIN QUANT: CPT | Performed by: NURSE PRACTITIONER

## 2024-04-10 PROCEDURE — 36415 COLL VENOUS BLD VENIPUNCTURE: CPT | Performed by: NURSE PRACTITIONER

## 2024-04-10 PROCEDURE — 85018 HEMOGLOBIN: CPT | Performed by: NURSE PRACTITIONER

## 2024-04-10 PROCEDURE — 25010000002 PROCHLORPERAZINE 10 MG/2ML SOLUTION: Performed by: INTERNAL MEDICINE

## 2024-04-10 PROCEDURE — 85027 COMPLETE CBC AUTOMATED: CPT | Performed by: NURSE PRACTITIONER

## 2024-04-10 PROCEDURE — 85014 HEMATOCRIT: CPT | Performed by: NURSE PRACTITIONER

## 2024-04-10 PROCEDURE — 36415 COLL VENOUS BLD VENIPUNCTURE: CPT

## 2024-04-10 PROCEDURE — 96361 HYDRATE IV INFUSION ADD-ON: CPT

## 2024-04-10 PROCEDURE — 84484 ASSAY OF TROPONIN QUANT: CPT | Performed by: EMERGENCY MEDICINE

## 2024-04-10 PROCEDURE — 25810000003 SODIUM CHLORIDE 0.9 % SOLUTION: Performed by: NURSE PRACTITIONER

## 2024-04-10 PROCEDURE — 71275 CT ANGIOGRAPHY CHEST: CPT

## 2024-04-10 PROCEDURE — 25010000002 ONDANSETRON PER 1 MG: Performed by: INTERNAL MEDICINE

## 2024-04-10 PROCEDURE — 25510000001 IOPAMIDOL PER 1 ML: Performed by: EMERGENCY MEDICINE

## 2024-04-10 PROCEDURE — 99222 1ST HOSP IP/OBS MODERATE 55: CPT | Performed by: NURSE PRACTITIONER

## 2024-04-10 PROCEDURE — 74177 CT ABD & PELVIS W/CONTRAST: CPT

## 2024-04-10 PROCEDURE — 96375 TX/PRO/DX INJ NEW DRUG ADDON: CPT

## 2024-04-10 PROCEDURE — 80048 BASIC METABOLIC PNL TOTAL CA: CPT | Performed by: NURSE PRACTITIONER

## 2024-04-10 PROCEDURE — 99222 1ST HOSP IP/OBS MODERATE 55: CPT | Performed by: INTERNAL MEDICINE

## 2024-04-10 PROCEDURE — 25010000002 DIPHENHYDRAMINE PER 50 MG: Performed by: INTERNAL MEDICINE

## 2024-04-10 PROCEDURE — 85379 FIBRIN DEGRADATION QUANT: CPT | Performed by: EMERGENCY MEDICINE

## 2024-04-10 PROCEDURE — 96376 TX/PRO/DX INJ SAME DRUG ADON: CPT

## 2024-04-10 RX ORDER — PROCHLORPERAZINE EDISYLATE 5 MG/ML
10 INJECTION INTRAMUSCULAR; INTRAVENOUS ONCE
Status: COMPLETED | OUTPATIENT
Start: 2024-04-10 | End: 2024-04-10

## 2024-04-10 RX ORDER — HYDROXYZINE HYDROCHLORIDE 25 MG/1
25 TABLET, FILM COATED ORAL EVERY 8 HOURS PRN
Status: DISCONTINUED | OUTPATIENT
Start: 2024-04-10 | End: 2024-04-11

## 2024-04-10 RX ORDER — HYDROCODONE BITARTRATE AND ACETAMINOPHEN 5; 325 MG/1; MG/1
1 TABLET ORAL EVERY 6 HOURS PRN
Status: DISCONTINUED | OUTPATIENT
Start: 2024-04-10 | End: 2024-04-12 | Stop reason: HOSPADM

## 2024-04-10 RX ORDER — ACETAMINOPHEN 650 MG/1
650 SUPPOSITORY RECTAL EVERY 4 HOURS PRN
Status: DISCONTINUED | OUTPATIENT
Start: 2024-04-10 | End: 2024-04-12 | Stop reason: HOSPADM

## 2024-04-10 RX ORDER — SUMATRIPTAN 50 MG/1
50 TABLET, FILM COATED ORAL
Status: DISCONTINUED | OUTPATIENT
Start: 2024-04-10 | End: 2024-04-12 | Stop reason: HOSPADM

## 2024-04-10 RX ORDER — SODIUM CHLORIDE 9 MG/ML
40 INJECTION, SOLUTION INTRAVENOUS AS NEEDED
Status: DISCONTINUED | OUTPATIENT
Start: 2024-04-10 | End: 2024-04-12 | Stop reason: HOSPADM

## 2024-04-10 RX ORDER — NARATRIPTAN 2.5 MG/1
2.5 TABLET ORAL ONCE AS NEEDED
COMMUNITY

## 2024-04-10 RX ORDER — SODIUM CHLORIDE 0.9 % (FLUSH) 0.9 %
10 SYRINGE (ML) INJECTION AS NEEDED
Status: DISCONTINUED | OUTPATIENT
Start: 2024-04-10 | End: 2024-04-12 | Stop reason: HOSPADM

## 2024-04-10 RX ORDER — BUSPIRONE HYDROCHLORIDE 10 MG/1
10 TABLET ORAL 3 TIMES DAILY
Status: DISCONTINUED | OUTPATIENT
Start: 2024-04-10 | End: 2024-04-12 | Stop reason: HOSPADM

## 2024-04-10 RX ORDER — SODIUM CHLORIDE 0.9 % (FLUSH) 0.9 %
10 SYRINGE (ML) INJECTION EVERY 12 HOURS SCHEDULED
Status: DISCONTINUED | OUTPATIENT
Start: 2024-04-10 | End: 2024-04-12 | Stop reason: HOSPADM

## 2024-04-10 RX ORDER — NITROGLYCERIN 0.4 MG/1
0.4 TABLET SUBLINGUAL
Status: DISCONTINUED | OUTPATIENT
Start: 2024-04-10 | End: 2024-04-12 | Stop reason: HOSPADM

## 2024-04-10 RX ORDER — ONDANSETRON 2 MG/ML
4 INJECTION INTRAMUSCULAR; INTRAVENOUS EVERY 6 HOURS PRN
Status: DISCONTINUED | OUTPATIENT
Start: 2024-04-10 | End: 2024-04-11

## 2024-04-10 RX ORDER — TIZANIDINE 4 MG/1
4 TABLET ORAL EVERY 8 HOURS PRN
Status: DISCONTINUED | OUTPATIENT
Start: 2024-04-10 | End: 2024-04-12 | Stop reason: HOSPADM

## 2024-04-10 RX ORDER — FLUTICASONE PROPIONATE 50 MCG
2 SPRAY, SUSPENSION (ML) NASAL DAILY
Status: DISCONTINUED | OUTPATIENT
Start: 2024-04-10 | End: 2024-04-12 | Stop reason: HOSPADM

## 2024-04-10 RX ORDER — ACETAMINOPHEN 160 MG/5ML
650 SOLUTION ORAL EVERY 4 HOURS PRN
Status: DISCONTINUED | OUTPATIENT
Start: 2024-04-10 | End: 2024-04-12 | Stop reason: HOSPADM

## 2024-04-10 RX ORDER — CALCIUM CARBONATE 500 MG/1
2 TABLET, CHEWABLE ORAL 2 TIMES DAILY PRN
Status: DISCONTINUED | OUTPATIENT
Start: 2024-04-10 | End: 2024-04-12 | Stop reason: HOSPADM

## 2024-04-10 RX ORDER — SODIUM CHLORIDE 9 MG/ML
75 INJECTION, SOLUTION INTRAVENOUS CONTINUOUS
Status: DISCONTINUED | OUTPATIENT
Start: 2024-04-10 | End: 2024-04-12

## 2024-04-10 RX ORDER — PANTOPRAZOLE SODIUM 40 MG/10ML
40 INJECTION, POWDER, LYOPHILIZED, FOR SOLUTION INTRAVENOUS EVERY 12 HOURS SCHEDULED
Status: DISCONTINUED | OUTPATIENT
Start: 2024-04-10 | End: 2024-04-12

## 2024-04-10 RX ORDER — DIPHENHYDRAMINE HYDROCHLORIDE 50 MG/ML
25 INJECTION INTRAMUSCULAR; INTRAVENOUS ONCE
Status: COMPLETED | OUTPATIENT
Start: 2024-04-10 | End: 2024-04-10

## 2024-04-10 RX ORDER — ACETAMINOPHEN 325 MG/1
650 TABLET ORAL EVERY 4 HOURS PRN
Status: DISCONTINUED | OUTPATIENT
Start: 2024-04-10 | End: 2024-04-12 | Stop reason: HOSPADM

## 2024-04-10 RX ORDER — FREMANEZUMAB-VFRM 225 MG/1.5ML
INJECTION SUBCUTANEOUS
COMMUNITY

## 2024-04-10 RX ORDER — CARVEDILOL 3.12 MG/1
3.12 TABLET ORAL 2 TIMES DAILY WITH MEALS
Status: DISCONTINUED | OUTPATIENT
Start: 2024-04-10 | End: 2024-04-12 | Stop reason: HOSPADM

## 2024-04-10 RX ADMIN — Medication 10 ML: at 07:59

## 2024-04-10 RX ADMIN — BUSPIRONE HYDROCHLORIDE 10 MG: 10 TABLET ORAL at 21:16

## 2024-04-10 RX ADMIN — PROCHLORPERAZINE EDISYLATE 10 MG: 5 INJECTION INTRAMUSCULAR; INTRAVENOUS at 10:39

## 2024-04-10 RX ADMIN — TIZANIDINE 4 MG: 4 TABLET ORAL at 16:18

## 2024-04-10 RX ADMIN — BRIVARACETAM 100 MG: 25 TABLET, FILM COATED ORAL at 21:15

## 2024-04-10 RX ADMIN — ONDANSETRON 4 MG: 2 INJECTION INTRAMUSCULAR; INTRAVENOUS at 21:26

## 2024-04-10 RX ADMIN — HYDROCODONE BITARTRATE AND ACETAMINOPHEN 1 TABLET: 5; 325 TABLET ORAL at 11:40

## 2024-04-10 RX ADMIN — SODIUM CHLORIDE 75 ML/HR: 9 INJECTION, SOLUTION INTRAVENOUS at 16:18

## 2024-04-10 RX ADMIN — AMITRIPTYLINE HYDROCHLORIDE 75 MG: 50 TABLET, FILM COATED ORAL at 21:15

## 2024-04-10 RX ADMIN — FLUTICASONE PROPIONATE 2 SPRAY: 50 SPRAY, METERED NASAL at 10:39

## 2024-04-10 RX ADMIN — HYDROCODONE BITARTRATE AND ACETAMINOPHEN 1 TABLET: 5; 325 TABLET ORAL at 19:38

## 2024-04-10 RX ADMIN — ONDANSETRON 4 MG: 2 INJECTION INTRAMUSCULAR; INTRAVENOUS at 16:18

## 2024-04-10 RX ADMIN — PANTOPRAZOLE SODIUM 40 MG: 40 INJECTION, POWDER, LYOPHILIZED, FOR SOLUTION INTRAVENOUS at 03:52

## 2024-04-10 RX ADMIN — Medication 10 ML: at 08:44

## 2024-04-10 RX ADMIN — BUSPIRONE HYDROCHLORIDE 10 MG: 10 TABLET ORAL at 16:18

## 2024-04-10 RX ADMIN — BRIVARACETAM 100 MG: 25 TABLET, FILM COATED ORAL at 11:40

## 2024-04-10 RX ADMIN — PANTOPRAZOLE SODIUM 40 MG: 40 INJECTION, POWDER, LYOPHILIZED, FOR SOLUTION INTRAVENOUS at 21:16

## 2024-04-10 RX ADMIN — HYDROXYZINE HYDROCHLORIDE 25 MG: 25 TABLET ORAL at 21:26

## 2024-04-10 RX ADMIN — CARVEDILOL 3.12 MG: 3.12 TABLET, FILM COATED ORAL at 17:53

## 2024-04-10 RX ADMIN — CARVEDILOL 3.12 MG: 3.12 TABLET, FILM COATED ORAL at 10:40

## 2024-04-10 RX ADMIN — DIPHENHYDRAMINE HYDROCHLORIDE 25 MG: 50 INJECTION, SOLUTION INTRAMUSCULAR; INTRAVENOUS at 10:39

## 2024-04-10 RX ADMIN — ONDANSETRON 4 MG: 2 INJECTION INTRAMUSCULAR; INTRAVENOUS at 07:58

## 2024-04-10 RX ADMIN — IOPAMIDOL 95 ML: 755 INJECTION, SOLUTION INTRAVENOUS at 00:42

## 2024-04-10 RX ADMIN — ACETAMINOPHEN 325MG 650 MG: 325 TABLET ORAL at 03:52

## 2024-04-10 RX ADMIN — BUSPIRONE HYDROCHLORIDE 10 MG: 10 TABLET ORAL at 10:40

## 2024-04-10 NOTE — PLAN OF CARE
Goal Outcome Evaluation:         Pt alert and oriented x4. Pain treated per MAR, VS WNL.
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
## 2024-04-10 NOTE — ED PROVIDER NOTES
EMERGENCY DEPARTMENT ENCOUNTER    Room Number:  23/23  Date of encounter:  4/10/2024  PCP: Servando Gao MD  Historian: Patient and daughter  Relevant information and history provided by sources other than the patient will be included below and in the ED Course.  Review of pertinent past medical records may also be included in record below and ED Course.    HPI:  Chief Complaint: Multiple complaints  A complete HPI/ROS/PMH/PSH/SH/FH are unobtainable due to: Not applicable  Context: Liya Carlson is a 38 y.o. female who presents to the ED c/o patient has multiple complaints.  Her first complaint consisted of problems swallowing.  This started the past week to 2 weeks.  She feels that when she eats she will get pain in the center of her chest and feels like her food does not go through.  She said this is progressed and she is no longer eating and is just drinking liquids.  This is a tightness and discomfort that is felt right at her chest bone in the lower portion of her sternum.  She also reports that she has another type of chest pain this been occurring for several weeks.  That is more of a tightness and a heavy weight on her chest.  That will come and go.  It does not occur on an every day basis but will occur about every other day.  It would last anywhere from 5 minutes to an hour or a couple hours.  She has a little bit of shortness of breath associated with chest this chest pain.  She is not aware of anything that makes this pain better or worse.  Is not worse with deep breathing is not worse with exertion.  I asked her if she thinks it is associated with her problems swallowing and the pain with swallowing solids.  She states that it could be but this pain does not always occur after eating.  She reports no fevers or chills or coughs or colds.  She also reports that she has had some blood in her stool.  This started about 3 days ago.  She is currently on Eliquis that she has had a history of PE.  The  blood in the stool is bright red.  No black stool.  She is said she has had 3 stools today that have been bloody.  With clots.  Probably a small to moderate amount of blood she noticed the blood in the toilet.  She also reports that she has had abdominal pain that is come and go for several days to a week.  Not aware of anything that makes the pain in her abdomen worse or better.  When she stands up for the past couple of days she is felt weak and tired.  She describes this is dizziness.  It is not a spinning sensation it is more of a weakness and lightheadedness.  She denies any visual change, speech change or focal weakness to her arms or legs.  She states that she saw Dr. Gao a couple weeks ago for the chest pain.  She states that he had some monitor placed on her.  She is uncertain specifically what that monitor was.  She has no history of heart disease.  She has had a history of PE in the past and reports compliance with Eliquis.  She reports no fevers or chills or urinary symptoms.  This episode of chest pain that she is having right now started at about 6:00.  Started when she was at rest and has been constant.  It will vein wane and wax in severity.  Currently the pain she feels in her chest feels like a weight or heaviness on her chest.      Previous Episodes: No not entirely the same as previous episodes of pain in the past.  Current Symptoms: See above    MEDICAL HISTORY REVIEWED  In looking old records I can see this patient has had a history of a PE in the past and has been on Eliquis.  She has also had a history of seizures and migraines as well as pancreatitis in the past.  Has hyperlipidemia.      PAST MEDICAL HISTORY  Active Ambulatory Problems     Diagnosis Date Noted    Seizures     Pancreatitis     Gastric ulcer     Dyslipidemia 03/18/2016    Environmental allergies 03/18/2016    Intractable migraine without aura and without status migrainosus 08/08/2017    Single subsegmental pulmonary  embolism without acute cor pulmonale 09/08/2022    Abnormal CT scan, sigmoid colon 09/08/2022    Menorrhagia with regular cycle 05/08/2018    Muscle spasms of lower extremity 10/07/2022    COVID-19 virus infection 01/23/2023    Abdominal pain 09/23/2015     Resolved Ambulatory Problems     Diagnosis Date Noted    No Resolved Ambulatory Problems     Past Medical History:   Diagnosis Date    Chest wall contusion     Contusion, abdominal wall     Headache, tension-type     History of blood clots     Migraine     Pulmonary embolism          PAST SURGICAL HISTORY  Past Surgical History:   Procedure Laterality Date    LAPAROSCOPIC TUBAL LIGATION      SIGMOIDOSCOPY N/A 9/11/2022    Procedure: SIGMOIDOSCOPY FLEXIBLE TO DESCENDING COLON;  Surgeon: Sierra Kerr MD;  Location: [facility] ENDOSCOPY;  Service: Gastroenterology;  Laterality: N/A;  PRE- ABDOMINAL PAIN, ABNORMAL CT  POST- SMALL HEMORRHOIDS         FAMILY HISTORY  Family History   Problem Relation Age of Onset    Diabetes Mother     Hypertension Mother     Migraines Mother     Dementia Mother     Arthritis Mother     Kidney disease Mother     Colon polyps Father     Hypertension Father     Diabetes Father     Seizures Father     Stroke Father     Colon cancer Paternal Aunt     Colon polyps Paternal Aunt     Colon cancer Paternal Grandmother     Colon polyps Paternal Grandmother          SOCIAL HISTORY  Social History     Socioeconomic History    Marital status:    Tobacco Use    Smoking status: Never    Smokeless tobacco: Never   Vaping Use    Vaping status: Never Used   Substance and Sexual Activity    Alcohol use: Yes     Comment: OCCASIONAL    Drug use: No    Sexual activity: Defer         ALLERGIES  Patient has no known allergies.        REVIEW OF SYSTEMS  Review of Systems     All systems reviewed and negative except for those discussed in HPI.       PHYSICAL EXAM    I have reviewed the triage vital signs and nursing notes.    ED Triage Vitals   Temp  Heart Rate Resp BP SpO2   04/09/24 2229 04/09/24 2229 04/09/24 2229 04/09/24 2232 04/09/24 2229   99.2 °F (37.3 °C) 102 16 123/61 98 %      Temp src Heart Rate Source Patient Position BP Location FiO2 (%)   04/09/24 2229 04/09/24 2229 04/09/24 2232 04/09/24 2232 --   Tympanic Monitor Lying Right arm        GENERAL: Patient is in no acute cardiovascular respiratory distress..Vital signs on my initial evaluation have been reviewed.  O2 sat is 100% on room air.  Heart rate is normal on my exam in the 70s.  Blood pressure is normal and she is afebrile.  HENT: nares patent  Head/neck/ face are symmetric without gross deformity, signs of trauma, or swelling  EYES: no scleral icterus, no conjunctival pallor.  NECK: Supple, no meningismus  CV: regular rhythm, regular rate with intact distal pulses.  No murmur.  Pain is not reproducible with palpation or with deep breathing.  Location of pain she points to the lower sternum area and then spreads out under her left and right breast bilaterally.  RESPIRATORY: normal effort and no respiratory distress.  Clear to auscultation bilaterally  ABDOMEN: soft and  Morbidly obese.  Subjective tenderness on diffuse abdominal exam.  There is no guarding or rebound.  Rectal exam: No external hemorrhoids.  She has light brown stool with no black stool or gross blood.  Heema prompt school test is negative for blood  MUSCULOSKELETAL: no deformity.  Intact distal pulses that are equal strong and symmetric.  No pain with active or passive range of motion to extremities.  NEURO: alert and appropriate, moves all extremities, follows commands.  No focal motor or sensory changes  SKIN: warm, dry    Vital signs and nursing notes reviewed.        LAB RESULTS  Recent Results (from the past 24 hour(s))   ECG 12 Lead ED Triage Standing Order; Chest Pain    Collection Time: 04/09/24 10:35 PM   Result Value Ref Range    QT Interval 395 ms    QTC Interval 421 ms   POC Occult Blood Stool    Collection Time:  04/09/24 11:11 PM    Specimen: Stool   Result Value Ref Range    Fecal Occult Blood Negative Negative    Lot Number [id number]     Expiration Date August 31, 2024     Positive Control Positive Positive    Negative Control Negative Negative   Comprehensive Metabolic Panel    Collection Time: 04/09/24 11:14 PM    Specimen: Blood   Result Value Ref Range    Glucose 108 (H) 65 - 99 mg/dL    BUN 9 6 - 20 mg/dL    Creatinine 0.93 0.57 - 1.00 mg/dL    Sodium 137 136 - 145 mmol/L    Potassium 3.9 3.5 - 5.2 mmol/L    Chloride 102 98 - 107 mmol/L    CO2 25.0 22.0 - 29.0 mmol/L    Calcium 9.1 8.6 - 10.5 mg/dL    Total Protein 6.8 6.0 - 8.5 g/dL    Albumin 3.9 3.5 - 5.2 g/dL    ALT (SGPT) 32 1 - 33 U/L    AST (SGOT) 19 1 - 32 U/L    Alkaline Phosphatase 69 39 - 117 U/L    Total Bilirubin 0.2 0.0 - 1.2 mg/dL    Globulin 2.9 gm/dL    A/G Ratio 1.3 g/dL    BUN/Creatinine Ratio 9.7 7.0 - 25.0    Anion Gap 10.0 5.0 - 15.0 mmol/L    eGFR 79.8 >60.0 mL/min/1.73   High Sensitivity Troponin T    Collection Time: 04/09/24 11:14 PM    Specimen: Blood   Result Value Ref Range    HS Troponin T <6 <14 ng/L   Green Top (Gel)    Collection Time: 04/09/24 11:14 PM   Result Value Ref Range    Extra Tube Hold for add-ons.    Lavender Top    Collection Time: 04/09/24 11:14 PM   Result Value Ref Range    Extra Tube hold for add-on    Gold Top - SST    Collection Time: 04/09/24 11:14 PM   Result Value Ref Range    Extra Tube Hold for add-ons.    Light Blue Top    Collection Time: 04/09/24 11:14 PM   Result Value Ref Range    Extra Tube Hold for add-ons.    CBC Auto Differential    Collection Time: 04/09/24 11:14 PM    Specimen: Blood   Result Value Ref Range    WBC 7.18 3.40 - 10.80 10*3/mm3    RBC 4.25 3.77 - 5.28 10*6/mm3    Hemoglobin 11.9 (L) 12.0 - 15.9 g/dL    Hematocrit 35.9 34.0 - 46.6 %    MCV 84.5 79.0 - 97.0 fL    MCH 28.0 26.6 - 33.0 pg    MCHC 33.1 31.5 - 35.7 g/dL    RDW 13.1 12.3 - 15.4 %    RDW-SD 40.0 37.0 - 54.0 fl    MPV 9.6 6.0 - 12.0  fL    Platelets 365 140 - 450 10*3/mm3    Neutrophil % 65.4 42.7 - 76.0 %    Lymphocyte % 26.2 19.6 - 45.3 %    Monocyte % 5.2 5.0 - 12.0 %    Eosinophil % 2.2 0.3 - 6.2 %    Basophil % 0.3 0.0 - 1.5 %    Immature Grans % 0.7 (H) 0.0 - 0.5 %    Neutrophils, Absolute 4.70 1.70 - 7.00 10*3/mm3    Lymphocytes, Absolute 1.88 0.70 - 3.10 10*3/mm3    Monocytes, Absolute 0.37 0.10 - 0.90 10*3/mm3    Eosinophils, Absolute 0.16 0.00 - 0.40 10*3/mm3    Basophils, Absolute 0.02 0.00 - 0.20 10*3/mm3    Immature Grans, Absolute 0.05 0.00 - 0.05 10*3/mm3    nRBC 0.0 0.0 - 0.2 /100 WBC   BNP    Collection Time: 04/09/24 11:14 PM    Specimen: Blood   Result Value Ref Range    proBNP 49.6 0.0 - 450.0 pg/mL   Lipase    Collection Time: 04/09/24 11:14 PM    Specimen: Blood   Result Value Ref Range    Lipase 16 13 - 60 U/L   D-dimer, Quantitative    Collection Time: 04/10/24  1:14 AM    Specimen: Blood   Result Value Ref Range    D-Dimer, Quantitative <0.27 0.00 - 0.50 MCGFEU/mL   High Sensitivity Troponin T 2Hr    Collection Time: 04/10/24  1:15 AM    Specimen: Blood   Result Value Ref Range    HS Troponin T <6 <14 ng/L    Troponin T Delta         Ordered the above labs and independently reviewed the results.        RADIOLOGY  CT Angiogram Chest    Result Date: 4/10/2024  Patient: TOSHIA AYERS  Time Out: 01:04 Exam(s): CTA CHEST EXAM:   CT Angiography Chest With Intravenous Contrast CLINICAL HISTORY:    Reason for exam: Chest pain and shortness of breath. Patient also does report dysphagia.. TECHNIQUE:   Axial computed tomographic angiography images of the chest with intravenous contrast.  CTDI is 3.57 mGy and DLP is 117.1 mGy-cm.  This CT exam was performed according to the principle of ALARA (As Low As Reasonably Achievable) by using one or more of the following dose reduction techniques: automated exposure control, adjustment of the mA and or kV according to patient size, and or use of iterative reconstruction technique.   MIP  reconstructed images were created and reviewed. COMPARISON:   No relevant prior studies available. FINDINGS:   Pulmonary arteries:  Unremarkable.  No pulmonary embolism.   Aorta:  No acute findings.  No thoracic aortic aneurysm.   Lungs:  Unremarkable.  No mass.  No consolidation.   Pleural space:  Unremarkable.  No significant effusion.  No pneumothorax.   Heart:  Unremarkable.  No cardiomegaly.  No significant pericardial effusion.  No evidence of RV dysfunction.   Bones joints:  No acute fracture.  No dislocation.   Soft tissues:  Unremarkable.   Lymph nodes:  Unremarkable.  No enlarged lymph nodes.   Liver:  Hepatic steatosis. IMPRESSION:       No acute findings in the visualized arteries of the chest.     Electronically signed by Solo Pierre MD on 04-10-24 at 0104    CT Abdomen Pelvis With Contrast    Result Date: 4/10/2024  Patient: TOSHIA AYERS  Time Out: 01:03 Exam(s): CT ABDOMEN + PELVIS With Contrast EXAM:   CT Abdomen and Pelvis With Intravenous Contrast CLINICAL HISTORY:    Reason for exam: Generalized abdominal pain with rectal bleeding. Patient is on Eliquis. TECHNIQUE:   Axial computed tomography images of the abdomen and pelvis with intravenous contrast.  CTDI is 13.44 mGy and DLP is 669.9 mGy-cm.  This CT exam was performed according to the principle of ALARA (As Low As Reasonably Achievable) by using one or more of the following dose reduction techniques: automated exposure control, adjustment of the mA and or kV according to patient size, and or use of iterative reconstruction technique. COMPARISON:   No relevant prior studies available. FINDINGS:   Lung bases:  Unremarkable.  No mass.  No consolidation.  ABDOMEN:   Liver:  Hepatic steatosis.   Gallbladder and bile ducts:  Contracted gallbladder.  No calcified stones.  No ductal dilation.   Pancreas:  Unremarkable.  No mass.  No ductal dilation.   Spleen:  Unremarkable.  No splenomegaly.   Adrenals:  Unremarkable.  No mass.   Kidneys and  ureters:  Unremarkable.  No hydronephrosis or delayed nephrogram.   Stomach and bowel:  Unremarkable.  No obstruction.  No mucosal thickening.  PELVIS:   Appendix:  Normal appendix.   Bladder:  Unremarkable.  No mass.   Reproductive:  Unremarkable as visualized.  ABDOMEN and PELVIS:   Intraperitoneal space:  Unremarkable.  No free air.  No significant fluid collection.   Bones joints:  No acute fracture.  No dislocation.   Soft tissues:  Unremarkable.   Vasculature:  Unremarkable.  No abdominal aortic aneurysm.   Lymph nodes:  Unremarkable.  No enlarged lymph nodes. IMPRESSION:       No acute findings in the abdomen or pelvis.     Electronically signed by Solo Pierre MD on 04-10-24 at 0103    XR Chest 1 View    Result Date: 4/10/2024  Patient: TOSHIA AYERS  Time Out: 00:43 Exam(s): XR CXR 1 VIEW EXAM:   XR Chest, 1 View CLINICAL HISTORY:    Reason for exam: Chest Pain Triage Protocol. TECHNIQUE:   Frontal view of the chest. COMPARISON:   No relevant prior studies available. FINDINGS:   Lungs:  Unremarkable.  No consolidation.   Pleural space:  Unremarkable.  No pneumothorax.   Heart:  Unremarkable.  No cardiomegaly.   Mediastinum:  Unremarkable.  Normal mediastinal contour.   Bones joints:  Unremarkable.  No acute fracture. IMPRESSION:       Normal chest x-ray.     Electronically signed by Solo Pierre MD on 04-10-24 at 0043     I ordered the above noted radiological studies. Reviewed by me and discussed with radiologist.  See dictation for official radiology interpretation.      PROCEDURES    Procedures      MEDICATIONS GIVEN IN ER    Medications   sodium chloride 0.9 % flush 10 mL (has no administration in time range)   aspirin tablet 325 mg (325 mg Oral Not Given 4/9/24 8786)   sodium chloride 0.9 % flush 10 mL (has no administration in time range)   sodium chloride 0.9 % flush 10 mL (has no administration in time range)   sodium chloride 0.9 % flush 10 mL (has no administration in time range)   sodium chloride  0.9 % infusion 40 mL (has no administration in time range)   nitroglycerin (NITROSTAT) SL tablet 0.4 mg (has no administration in time range)   acetaminophen (TYLENOL) tablet 650 mg (has no administration in time range)     Or   acetaminophen (TYLENOL) 160 MG/5ML oral solution 650 mg (has no administration in time range)     Or   acetaminophen (TYLENOL) suppository 650 mg (has no administration in time range)   calcium carbonate (TUMS) chewable tablet 500 mg (200 mg elemental) (has no administration in time range)   pantoprazole (PROTONIX) injection 40 mg (has no administration in time range)   fentaNYL citrate (PF) (SUBLIMAZE) injection 50 mcg (50 mcg Intravenous Given 4/9/24 2330)   ondansetron (ZOFRAN) injection 4 mg (4 mg Intravenous Given 4/9/24 2330)   iopamidol (ISOVUE-370) 76 % injection 95 mL (95 mL Intravenous Given 4/10/24 0042)         All labs have been independently reviewed by me.  All radiology studies have been reviewed by me and I discussed with radiologist dictating the report when indicated below.  All EKG's independently viewed and interpreted by me.  Discussion below represents my analysis of pertinent findings related to patient's condition, differential diagnosis, treatment plan and final disposition.        PROGRESS, DATA ANALYSIS, CONSULTS, AND MEDICAL DECISION MAKING    DDx includes acute coronary syndrome, pulmonary embolism, thoracic aortic dissection, pneumonia, pneumothorax, musculoskeletal pain, GERD or esophageal spasm, PUD, esophagitis, anxiety, myocarditis/pericarditis, esophageal rupture, pancreatitis.     I have looked at her EKG and I do not see any obvious acute injury pattern.  Her pain in her chest sounds atypical for cardiac etiology.  Will check cardiac enzymes.  This pain is not worse with exertion.  She has a history of PE but has been compliant with Eliquis.  She also has dysphagia.  That very well could be the source of her pain.  She has pain with swallowing.  She is  requesting medicine for pain.  She does not want Tylenol.  She is on Eliquis so long and avoid nonsteroidals.  I will give her a small dose of fentanyl and Zofran.  She also reports diffuse abdominal pain.  Her belly is soft.  There is no guarding or rebound.  Will check lab work and CT her abdomen pelvis.  On rectal exam there is no gross blood and Heema prompt stool is negative for occult blood.  This patient will very likely need to be admitted.  Informed her of the test that we will order.  All questions answered at this time.      ED Course as of 04/10/24 0239   Tue Apr 09, 2024   2313 My own independent interpretation of the EKG that was done at 1035 reveals a rate of 68 it is normal sinus rhythm there is some intraventricular conduction delay I do not appreciate any acute injury pattern.  I did compare to the previous EKG on 3/2/2023 and looks very similar. [MM]   2348 Hemoglobin(!): 11.9  11 months ago patient's hemoglobin was 12.4. [MM]   Wed Apr 10, 2024   0047 HS Troponin T: <6 [MM]   0047 Chest x-ray shows no acute active disease. [MM]   0129 CT scan of the chest shows no acute findings.  No pulmonary embolism.  No obvious focal pulmonary consolidation.  I reviewed the CT scan report.  Please see complete dictated report from radiologist [MM]   0131 I reviewed the CT scan of the abdomen pelvis report.  No acute abnormality seen. [MM]   0211 When I reevaluated this patient.  She initially is sleeping and rest comfortable.  Vital signs are unremarkable.  She easily awakens.  I informed her about the results of the CT scan and lab work.  She still reporting pain in the mid epigastric region and lower portion of her chest.  Does not appear to be in any discomfort. [MM]   0211 Patient's heart enzymes are unremarkable.  I really think if this was cardiac in etiology we will see some abnormality.  I do not see any acute EKG changes.  I see some nonspecific findings on the EKG.  She is on Eliquis and the CT  angiogram of the chest shows no obvious PE or any other other pulmonary or intrathoracic problem.  My plan is to admit her to the hospital.  All questions answered. [MM]   0214 I discussed the case with Nadja who is on for [facility].  Dr. Cordon is the attending for [facility].  Informed Nadja the patient's presenting symptoms and results of test.  She agrees to admit the patient to the hospital. [MM]      ED Course User Index  [MM] Marcellus Sandoval MD       AS OF 02:39 EDT VITALS:    BP - 122/72  HR - 91  TEMP - 99.2 °F (37.3 °C) (Tympanic)  02 SATS - 99%    SOCIAL DETERMINANTS OF HEALTH THAT IMPACT OR LIMIT CARE (For example..Homelessness,safe discharge, inability to obtain care, follow up, or prescriptions):      DIAGNOSIS  Final diagnoses:   Chest pain, unspecified type   Dysphagia, unspecified type   Rectal bleed   Generalized abdominal pain   Coagulopathy: Eliquis induced         DISPOSITION  I have reviewed the test results with my patient and explained the current treatment plan.  I answered all of the patient's questions.  The patient will be admitted to monitor bed at this time.  The patient is not hypotensive and is tolerating their current disease condition well enough for a monitored bed at this time.  The patient's current condition does not require intensive care treatment at this time.            DICTATED UTILIZING DRAGON DICTATION    Note Disclaimer: At [facility], we believe that sharing information builds trust and better relationships. You are receiving this note because you recently visited [facility]. It is possible you will see health information before a provider has talked with you about it. This kind of information can be easy to misunderstand. To help you fully understand what it means for your health, we urge you to discuss this note with your provider.       Marcellus Sandoval MD  04/10/24 6231

## 2024-04-10 NOTE — CASE MANAGEMENT/SOCIAL WORK
Discharge Planning Assessment  [facility]     Patient Name: Liya Carlson  MRN: [id number]  Today's Date: 4/10/2024    Admit Date: 4/9/2024    Plan: home with family   Discharge Needs Assessment       Row Name 04/10/24 7657       Living Environment    People in Home spouse    Current Living Arrangements home    Potentially Unsafe Housing Conditions none    Primary Care Provided by self    Provides Primary Care For no one    Family Caregiver if Needed spouse    Family Caregiver Names Erik [phone]    Quality of Family Relationships helpful    Able to Return to Prior Arrangements yes       Resource/Environmental Concerns    Resource/Environmental Concerns none    Transportation Concerns none       Transition Planning    Patient/Family Anticipates Transition to home with family    Patient/Family Anticipated Services at Transition none    Transportation Anticipated family or friend will provide       Discharge Needs Assessment    Readmission Within the Last 30 Days no previous admission in last 30 days    Equipment Currently Used at Home none    Concerns to be Addressed no discharge needs identified;denies needs/concerns at this time    Anticipated Changes Related to Illness none    Equipment Needed After Discharge none    Provided Post Acute Provider List? N/A    N/A Provider List Comment denies dc needs at this time    Provided Post Acute Provider Quality & Resource List? N/A                   Discharge Plan       Row Name 04/10/24 5203       Plan    Plan home with family    Patient/Family in Agreement with Plan yes    Plan Comments Spoke with patient at bedside. Introduced self and explained role. Facesheet, PCP and pharmacy verified. Denies dc needs at this time. Patient is IADL and works full time and drives. She is currently up ad darci in room. DC plan is home with family                  Continued Care and Services - Admitted Since 4/9/2024    No active coordination exists for this encounter.        Expected Discharge Date and Time       Expected Discharge Date Expected Discharge Time    Apr 11, 2024            Demographic Summary       Row Name 04/10/24 1336       General Information    Admission Type inpatient    Arrived From emergency department    Referral Source admission list    Reason for Consult discharge planning    Preferred Language English                   Functional Status       Row Name 04/10/24 1336       Functional Status    Usual Activity Tolerance good    Current Activity Tolerance moderate       Functional Status, IADL    Medications independent    Meal Preparation independent    Housekeeping independent    Laundry independent    Shopping independent       Mental Status    General Appearance WDL WDL       Mental Status Summary    Recent Changes in Mental Status/Cognitive Functioning no changes       Employment/    Employment Status employed full-time                               Elisa Peña RN

## 2024-04-10 NOTE — ED NOTES
Nursing report ED to floor  Liya Carlson  38 y.o.  female    HPI :   Chief Complaint   Patient presents with    Chest Pain    Dizziness    Black or Bloody Stool       Admitting doctor:   Ishan Cordon MD    Admitting diagnosis:   The primary encounter diagnosis was Chest pain, unspecified type. Diagnoses of Dysphagia, unspecified type, Rectal bleed, Generalized abdominal pain, and Coagulopathy: Eliquis induced were also pertinent to this visit.    Code status:   Current Code Status       Date Active Code Status Order ID Comments User Context       4/10/2024 0221 CPR (Attempt to Resuscitate) [id number]  Nadja Hammonds APRN ED        Question Answer    Code Status (Patient has no pulse and is not breathing) CPR (Attempt to Resuscitate)    Medical Interventions (Patient has pulse or is breathing) Full Support                    Allergies:   Patient has no known allergies.    Isolation:   No active isolations    Intake and Output  No intake or output data in the 24 hours ending 04/10/24 0224    Weight:       04/09/24 2229   Weight: 89.8 kg (198 lb)       Most recent vitals:   Vitals:    04/10/24 0031 04/10/24 0112 04/10/24 0113 04/10/24 0114   BP: 122/72      BP Location:       Patient Position:       Pulse: 81 88 91    Resp:       Temp:       TempSrc:       SpO2: 99% 99% 100% 99%   Weight:       Height:           Active LDAs/IV Access:   Lines, Drains & Airways       Active LDAs       Name Placement date Placement time Site Days    Peripheral IV 04/09/24 2313 Right Antecubital 04/09/24 2313  Antecubital  less than 1                    Labs (abnormal labs have a star):   Labs Reviewed   COMPREHENSIVE METABOLIC PANEL - Abnormal; Notable for the following components:       Result Value    Glucose 108 (*)     All other components within normal limits    Narrative:     GFR Normal >60  Chronic Kidney Disease <60  Kidney Failure <15     CBC WITH AUTO DIFFERENTIAL - Abnormal; Notable for the following components:     "Hemoglobin 11.9 (*)     Immature Grans % 0.7 (*)     All other components within normal limits   TROPONIN - Normal    Narrative:     High Sensitive Troponin T Reference Range:  <14.0 ng/L- Negative Female for AMI  <22.0 ng/L- Negative Male for AMI  >=14 - Abnormal Female indicating possible myocardial injury.  >=22 - Abnormal Male indicating possible myocardial injury.   Clinicians would have to utilize clinical acumen, EKG, Troponin, and serial changes to determine if it is an Acute Myocardial Infarction or myocardial injury due to an underlying chronic condition.        D-DIMER, QUANTITATIVE - Normal    Narrative:     According to the assay 's published package insert, a normal (<0.50 MCGFEU/mL) D-dimer result in conjunction with a non-high clinical probability assessment, excludes deep vein thrombosis (DVT) and pulmonary embolism (PE) with high sensitivity.    D-dimer values increase with age and this can make VTE exclusion of an older population difficult. To address this, the [employer] College [employer], based on best available evidence and recent guidelines, recommends that clinicians use age-adjusted D-dimer thresholds in patients greater than 50 years of age with: a) a low probability of PE who do not meet all Pulmonary Embolism Rule Out Criteria, or b) in those with intermediate probability of PE.   The formula for an age-adjusted D-dimer cut-off is \"age/100\".  For example, a 60 year old patient would have an age-adjusted cut-off of 0.60 MCGFEU/mL and an 80 year old 0.80 MCGFEU/mL.   BNP (IN-HOUSE) - Normal    Narrative:     This assay is used as an aid in the diagnosis of individuals suspected of having heart failure. It can be used as an aid in the diagnosis of acute decompensated heart failure (ADHF) in patients presenting with signs and symptoms of ADHF to the emergency department (ED). In addition, NT-proBNP of <300 pg/mL indicates ADHF is not likely.    Age Range Result Interpretation  " NT-proBNP Concentration (pg/mL:      <50             Positive            >450                   Gray                 300-450                    Negative             <300    50-75           Positive            >900                  Gray                300-900                  Negative            <300      >75             Positive            >1800                  Gray                300-1800                  Negative            <300   LIPASE - Normal   RAINBOW DRAW    Narrative:     The following orders were created for panel order [place] Draw.  Procedure                               Abnormality         Status                     ---------                               -----------         ------                     Green Top (Gel)[[id number]]                                  Final result               Lavender Top[[id number]]                                     Final result               Gold Top - SST[[id number]]                                   Final result               Light Blue Top[[id number]]                                   Final result                 Please view results for these tests on the individual orders.   HIGH SENSITIVITIY TROPONIN T 2HR    Narrative:     High Sensitive Troponin T Reference Range:  <14.0 ng/L- Negative Female for AMI  <22.0 ng/L- Negative Male for AMI  >=14 - Abnormal Female indicating possible myocardial injury.  >=22 - Abnormal Male indicating possible myocardial injury.   Clinicians would have to utilize clinical acumen, EKG, Troponin, and serial changes to determine if it is an Acute Myocardial Infarction or myocardial injury due to an underlying chronic condition.        BASIC METABOLIC PANEL   CBC (NO DIFF)   TROPONIN   HEMOGLOBIN AND HEMATOCRIT, BLOOD   POCT OCCULT BLOOD STOOL (ED ONLY)   CBC AND DIFFERENTIAL    Narrative:     The following orders were created for panel order CBC & Differential.  Procedure                               Abnormality         Status                      ---------                               -----------         ------                     CBC Auto Differential[[id number]]        Abnormal            Final result                 Please view results for these tests on the individual orders.   GREEN TOP   LAVENDER TOP   GOLD TOP - SST   LIGHT BLUE TOP       EKG:   ECG 12 Lead ED Triage Standing Order; Chest Pain   Preliminary Result   HEART RATE= 68  bpm   RR Interval= 882  ms   GA Interval= 197  ms   P Horizontal Axis= 22  deg   P Front Axis= 26  deg   QRSD Interval= 88  ms   QT Interval= 395  ms   QTcB= 421  ms   QRS Axis= -14  deg   T Wave Axis= 30  deg   - NORMAL ECG -   Sinus rhythm   Baseline wander in lead(s) V2   Electronically Signed By:    Date and Time of Study: 2024-04-09 22:35:10          Meds given in ED:   Medications   sodium chloride 0.9 % flush 10 mL (has no administration in time range)   aspirin tablet 325 mg (325 mg Oral Not Given 4/9/24 2316)   sodium chloride 0.9 % flush 10 mL (has no administration in time range)   sodium chloride 0.9 % flush 10 mL (has no administration in time range)   sodium chloride 0.9 % flush 10 mL (has no administration in time range)   sodium chloride 0.9 % infusion 40 mL (has no administration in time range)   nitroglycerin (NITROSTAT) SL tablet 0.4 mg (has no administration in time range)   acetaminophen (TYLENOL) tablet 650 mg (has no administration in time range)     Or   acetaminophen (TYLENOL) 160 MG/5ML oral solution 650 mg (has no administration in time range)     Or   acetaminophen (TYLENOL) suppository 650 mg (has no administration in time range)   calcium carbonate (TUMS) chewable tablet 500 mg (200 mg elemental) (has no administration in time range)   pantoprazole (PROTONIX) injection 40 mg (has no administration in time range)   fentaNYL citrate (PF) (SUBLIMAZE) injection 50 mcg (50 mcg Intravenous Given 4/9/24 2330)   ondansetron (ZOFRAN) injection 4 mg (4 mg Intravenous Given 4/9/24 2330)   iopamidol  (ISOVUE-370) 76 % injection 95 mL (95 mL Intravenous Given 4/10/24 0042)       Imaging results:  CT Angiogram Chest    Result Date: 4/10/2024  Electronically signed by Solo Pierre MD on 04-10-24 at 0104    CT Abdomen Pelvis With Contrast    Result Date: 4/10/2024  Electronically signed by Solo Pierre MD on 04-10-24 at 0103    XR Chest 1 View    Result Date: 4/10/2024  Electronically signed by Solo Pierre MD on 04-10-24 at 0043     Ambulatory status:   - ambulatory    Social issues:   Social History     Socioeconomic History    Marital status:    Tobacco Use    Smoking status: Never    Smokeless tobacco: Never   Vaping Use    Vaping status: Never Used   Substance and Sexual Activity    Alcohol use: Yes     Comment: OCCASIONAL    Drug use: No    Sexual activity: Defer       NIH Stroke Scale:       Zion Guerra RN  04/10/24 02:24 EDT
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
## 2024-04-10 NOTE — PLAN OF CARE
Goal Outcome Evaluation:              Outcome Evaluation: pt is A&Ox4, up ad darci, pt on RA, Pt c/o of mid upper gastric pain, PRN pain med given, pt c/o of nausea, Zofran given PRN, pt to have EGD and Colonoscopy Friday, VSS, Will continue to monitor rest of shift.

## 2024-04-11 PROBLEM — F41.9 ANXIETY DISORDER: Status: ACTIVE | Noted: 2024-04-11

## 2024-04-11 LAB
ANION GAP SERPL CALCULATED.3IONS-SCNC: 9.1 MMOL/L (ref 5–15)
BUN SERPL-MCNC: 8 MG/DL (ref 6–20)
BUN/CREAT SERPL: 9.9 (ref 7–25)
CALCIUM SPEC-SCNC: 8.8 MG/DL (ref 8.6–10.5)
CHLORIDE SERPL-SCNC: 104 MMOL/L (ref 98–107)
CO2 SERPL-SCNC: 23.9 MMOL/L (ref 22–29)
CREAT SERPL-MCNC: 0.81 MG/DL (ref 0.57–1)
DEPRECATED RDW RBC AUTO: 41.7 FL (ref 37–54)
EGFRCR SERPLBLD CKD-EPI 2021: 94.2 ML/MIN/1.73
ERYTHROCYTE [DISTWIDTH] IN BLOOD BY AUTOMATED COUNT: 13.2 % (ref 12.3–15.4)
GLUCOSE SERPL-MCNC: 128 MG/DL (ref 65–99)
HCT VFR BLD AUTO: 33.8 % (ref 34–46.6)
HCT VFR BLD AUTO: 33.8 % (ref 34–46.6)
HCT VFR BLD AUTO: 34.6 % (ref 34–46.6)
HCT VFR BLD AUTO: 34.7 % (ref 34–46.6)
HCT VFR BLD AUTO: 37.7 % (ref 34–46.6)
HGB BLD-MCNC: 10.9 G/DL (ref 12–15.9)
HGB BLD-MCNC: 11.2 G/DL (ref 12–15.9)
HGB BLD-MCNC: 11.2 G/DL (ref 12–15.9)
HGB BLD-MCNC: 11.5 G/DL (ref 12–15.9)
HGB BLD-MCNC: 11.8 G/DL (ref 12–15.9)
MCH RBC QN AUTO: 27.1 PG (ref 26.6–33)
MCHC RBC AUTO-ENTMCNC: 31.3 G/DL (ref 31.5–35.7)
MCV RBC AUTO: 86.5 FL (ref 79–97)
PLATELET # BLD AUTO: 360 10*3/MM3 (ref 140–450)
PMV BLD AUTO: 9.9 FL (ref 6–12)
POTASSIUM SERPL-SCNC: 3.9 MMOL/L (ref 3.5–5.2)
RBC # BLD AUTO: 4.36 10*6/MM3 (ref 3.77–5.28)
SODIUM SERPL-SCNC: 137 MMOL/L (ref 136–145)
WBC NRBC COR # BLD AUTO: 4.55 10*3/MM3 (ref 3.4–10.8)

## 2024-04-11 PROCEDURE — 85027 COMPLETE CBC AUTOMATED: CPT | Performed by: NURSE PRACTITIONER

## 2024-04-11 PROCEDURE — 36415 COLL VENOUS BLD VENIPUNCTURE: CPT | Performed by: NURSE PRACTITIONER

## 2024-04-11 PROCEDURE — 96361 HYDRATE IV INFUSION ADD-ON: CPT

## 2024-04-11 PROCEDURE — 99232 SBSQ HOSP IP/OBS MODERATE 35: CPT | Performed by: NURSE PRACTITIONER

## 2024-04-11 PROCEDURE — 85014 HEMATOCRIT: CPT | Performed by: NURSE PRACTITIONER

## 2024-04-11 PROCEDURE — 96376 TX/PRO/DX INJ SAME DRUG ADON: CPT

## 2024-04-11 PROCEDURE — 85018 HEMOGLOBIN: CPT | Performed by: NURSE PRACTITIONER

## 2024-04-11 PROCEDURE — 80048 BASIC METABOLIC PNL TOTAL CA: CPT | Performed by: NURSE PRACTITIONER

## 2024-04-11 PROCEDURE — 25010000002 PROCHLORPERAZINE 10 MG/2ML SOLUTION: Performed by: NURSE PRACTITIONER

## 2024-04-11 PROCEDURE — 85014 HEMATOCRIT: CPT | Performed by: INTERNAL MEDICINE

## 2024-04-11 PROCEDURE — 25010000002 ONDANSETRON PER 1 MG: Performed by: INTERNAL MEDICINE

## 2024-04-11 PROCEDURE — 85018 HEMOGLOBIN: CPT | Performed by: INTERNAL MEDICINE

## 2024-04-11 PROCEDURE — 25810000003 SODIUM CHLORIDE 0.9 % SOLUTION: Performed by: NURSE PRACTITIONER

## 2024-04-11 RX ORDER — PROCHLORPERAZINE EDISYLATE 5 MG/ML
5 INJECTION INTRAMUSCULAR; INTRAVENOUS EVERY 6 HOURS PRN
Status: DISCONTINUED | OUTPATIENT
Start: 2024-04-11 | End: 2024-04-12 | Stop reason: HOSPADM

## 2024-04-11 RX ORDER — ZOLPIDEM TARTRATE 5 MG/1
5 TABLET ORAL NIGHTLY PRN
Status: DISCONTINUED | OUTPATIENT
Start: 2024-04-12 | End: 2024-04-12 | Stop reason: HOSPADM

## 2024-04-11 RX ORDER — POLYETHYLENE GLYCOL 3350 17 G/17G
0.5 POWDER, FOR SOLUTION ORAL EVERY 6 HOURS
Status: COMPLETED | OUTPATIENT
Start: 2024-04-11 | End: 2024-04-11

## 2024-04-11 RX ORDER — BISACODYL 5 MG/1
20 TABLET, DELAYED RELEASE ORAL ONCE
Status: COMPLETED | OUTPATIENT
Start: 2024-04-11 | End: 2024-04-11

## 2024-04-11 RX ORDER — HYDROXYZINE HYDROCHLORIDE 25 MG/1
50 TABLET, FILM COATED ORAL EVERY 8 HOURS PRN
Status: DISCONTINUED | OUTPATIENT
Start: 2024-04-11 | End: 2024-04-12 | Stop reason: HOSPADM

## 2024-04-11 RX ADMIN — HYDROCODONE BITARTRATE AND ACETAMINOPHEN 1 TABLET: 5; 325 TABLET ORAL at 07:32

## 2024-04-11 RX ADMIN — PROCHLORPERAZINE EDISYLATE 5 MG: 5 INJECTION INTRAMUSCULAR; INTRAVENOUS at 15:29

## 2024-04-11 RX ADMIN — HYDROCODONE BITARTRATE AND ACETAMINOPHEN 1 TABLET: 5; 325 TABLET ORAL at 14:09

## 2024-04-11 RX ADMIN — POLYETHYLENE GLYCOL 3350 0.5 BOTTLE: 17 POWDER, FOR SOLUTION ORAL at 15:31

## 2024-04-11 RX ADMIN — CARVEDILOL 3.12 MG: 3.12 TABLET, FILM COATED ORAL at 08:40

## 2024-04-11 RX ADMIN — PANTOPRAZOLE SODIUM 40 MG: 40 INJECTION, POWDER, LYOPHILIZED, FOR SOLUTION INTRAVENOUS at 21:32

## 2024-04-11 RX ADMIN — HYDROXYZINE HYDROCHLORIDE 50 MG: 25 TABLET ORAL at 23:49

## 2024-04-11 RX ADMIN — FLUTICASONE PROPIONATE 2 SPRAY: 50 SPRAY, METERED NASAL at 08:40

## 2024-04-11 RX ADMIN — HYDROCODONE BITARTRATE AND ACETAMINOPHEN 1 TABLET: 5; 325 TABLET ORAL at 21:33

## 2024-04-11 RX ADMIN — SODIUM CHLORIDE 75 ML/HR: 9 INJECTION, SOLUTION INTRAVENOUS at 05:49

## 2024-04-11 RX ADMIN — HYDROXYZINE HYDROCHLORIDE 50 MG: 25 TABLET ORAL at 14:12

## 2024-04-11 RX ADMIN — Medication 10 ML: at 09:03

## 2024-04-11 RX ADMIN — PANTOPRAZOLE SODIUM 40 MG: 40 INJECTION, POWDER, LYOPHILIZED, FOR SOLUTION INTRAVENOUS at 08:40

## 2024-04-11 RX ADMIN — BUSPIRONE HYDROCHLORIDE 10 MG: 10 TABLET ORAL at 21:33

## 2024-04-11 RX ADMIN — TIZANIDINE 4 MG: 4 TABLET ORAL at 01:36

## 2024-04-11 RX ADMIN — BUSPIRONE HYDROCHLORIDE 10 MG: 10 TABLET ORAL at 08:39

## 2024-04-11 RX ADMIN — PROCHLORPERAZINE EDISYLATE 5 MG: 5 INJECTION INTRAMUSCULAR; INTRAVENOUS at 09:03

## 2024-04-11 RX ADMIN — CARVEDILOL 3.12 MG: 3.12 TABLET, FILM COATED ORAL at 18:48

## 2024-04-11 RX ADMIN — POLYETHYLENE GLYCOL 3350 0.5 BOTTLE: 17 POWDER, FOR SOLUTION ORAL at 21:46

## 2024-04-11 RX ADMIN — PROCHLORPERAZINE EDISYLATE 5 MG: 5 INJECTION INTRAMUSCULAR; INTRAVENOUS at 21:33

## 2024-04-11 RX ADMIN — HYDROCODONE BITARTRATE AND ACETAMINOPHEN 1 TABLET: 5; 325 TABLET ORAL at 01:36

## 2024-04-11 RX ADMIN — TIZANIDINE 4 MG: 4 TABLET ORAL at 23:49

## 2024-04-11 RX ADMIN — ONDANSETRON 4 MG: 2 INJECTION INTRAMUSCULAR; INTRAVENOUS at 05:58

## 2024-04-11 RX ADMIN — AMITRIPTYLINE HYDROCHLORIDE 75 MG: 50 TABLET, FILM COATED ORAL at 21:33

## 2024-04-11 RX ADMIN — BRIVARACETAM 100 MG: 25 TABLET, FILM COATED ORAL at 23:49

## 2024-04-11 RX ADMIN — BUSPIRONE HYDROCHLORIDE 10 MG: 10 TABLET ORAL at 15:45

## 2024-04-11 RX ADMIN — BISACODYL 20 MG: 5 TABLET, COATED ORAL at 14:09

## 2024-04-11 RX ADMIN — TIZANIDINE 4 MG: 4 TABLET ORAL at 15:43

## 2024-04-11 RX ADMIN — BRIVARACETAM 100 MG: 25 TABLET, FILM COATED ORAL at 11:00

## 2024-04-11 NOTE — PAYOR COMM NOTE
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 Pierre MD on 04-10-24 at 0043                ECG 12 Lead ED Triage Standing Order; Chest Pain   Preliminary Result   HEART RATE= 68  bpm   RR Interval= 882  ms   MD Interval= 197  ms   P Horizontal Axis= 22  deg   P Front Axis= 26  deg   QRSD Interval= 88  ms   QT Interval= 395  ms   QTcB= 421  ms   QRS Axis= -14  deg   T Wave Axis= 30  deg   - NORMAL ECG -   Sinus rhythm   Baseline wander in lead(s) V2   Electronically Signed By:    Date and Time of Study: 2024-04-09 22:35:10           Assessment/Plan     Active Hospital Problems    Diagnosis  POA    **Chest pain [R07.9]  Yes    History of pulmonary embolism [Z86.711]  Yes    Chronic anticoagulation [Z79.01]  Not Applicable    Rectal bleeding [K62.5]  Yes    Dysphagia [R13.10]  Yes    Factor V Leiden [D68.51]  Yes    Dyslipidemia [E78.5]  Yes    Seizures [R56.9]  Yes    Abdominal pain [R10.9]  Yes     Ms. Ayers is a 38 y.o. female that presented to the hospital with complaints of chest pain and rectal bleeding. She has a history of PE on Eliquis with Factor V Leiden.     Chest pain  -Atypical in nature and low suspicion for cardiac.  -Cardiology evaluated.  Enzymes negative as well as EKG.  -No plans for further  ischemic cardiac workup.    Abdominal pain  Rectal bleeding  Dysphagia  -Chest pain seems more epigastric in nature.  -Gastroenterology has been consulted.  -Hold Eliquis.  Would anticipate need for EGD and possible colonoscopy.  -Continue IV PPI and serial H/H checks.   -Will add low dose Norco for pain. Anti-emetics as needed.    Factor V Leiden  History of PE  -CTA chest negative for PE.  -Hold Eliquis temporarily.   -Followed by [facility] at baseline.    Seizures  -Resume home AED.  -Last seizure 7 years ago per her reports.  -Seizure precautions.    I discussed the patients findings and my recommendations with patient, nursing staff, and Dr. Corrales .    VTE Prophylaxis - SCDs. Resume Eliquis from home as soon as safely possible.  Code Status - Full code.       RAFIQ Hurt  [facility]  04/10/24  12:23 EDT      Electronically signed by Luis E Corrales MD at 04/10/24 1510          Emergency Department Notes        Zion Guerra RN at 04/10/24 0224          Nursing report ED to floor  Liya Carlson  38 y.o.  female    HPI :   Chief Complaint   Patient presents with    Chest Pain    Dizziness    Black or Bloody Stool       Admitting doctor:   Ishan Cordon MD    Admitting diagnosis:   The primary encounter diagnosis was Chest pain, unspecified type. Diagnoses of Dysphagia, unspecified type, Rectal bleed, Generalized abdominal pain, and Coagulopathy: Eliquis induced were also pertinent to this visit.    Code status:   Current Code Status       Date Active Code Status Order ID Comments User Context       4/10/2024 0221 CPR (Attempt to Resuscitate) [id number]  Nadja Hammonds APRN ED        Question Answer    Code Status (Patient has no pulse and is not breathing) CPR (Attempt to Resuscitate)    Medical Interventions (Patient has pulse or is breathing) Full Support                    Allergies:   Patient has no known allergies.    Isolation:   No active  isolations    Intake and Output  No intake or output data in the 24 hours ending 04/10/24 0224    Weight:       04/09/24  2229   Weight: 89.8 kg (198 lb)       Most recent vitals:   Vitals:    04/10/24 0031 04/10/24 0112 04/10/24 0113 04/10/24 0114   BP: 122/72      BP Location:       Patient Position:       Pulse: 81 88 91    Resp:       Temp:       TempSrc:       SpO2: 99% 99% 100% 99%   Weight:       Height:           Active LDAs/IV Access:   Lines, Drains & Airways       Active LDAs       Name Placement date Placement time Site Days    Peripheral IV 04/09/24 2313 Right Antecubital 04/09/24 2313  Antecubital  less than 1                    Labs (abnormal labs have a star):   Labs Reviewed   COMPREHENSIVE METABOLIC PANEL - Abnormal; Notable for the following components:       Result Value    Glucose 108 (*)     All other components within normal limits    Narrative:     GFR Normal >60  Chronic Kidney Disease <60  Kidney Failure <15     CBC WITH AUTO DIFFERENTIAL - Abnormal; Notable for the following components:    Hemoglobin 11.9 (*)     Immature Grans % 0.7 (*)     All other components within normal limits   TROPONIN - Normal    Narrative:     High Sensitive Troponin T Reference Range:  <14.0 ng/L- Negative Female for AMI  <22.0 ng/L- Negative Male for AMI  >=14 - Abnormal Female indicating possible myocardial injury.  >=22 - Abnormal Male indicating possible myocardial injury.   Clinicians would have to utilize clinical acumen, EKG, Troponin, and serial changes to determine if it is an Acute Myocardial Infarction or myocardial injury due to an underlying chronic condition.        D-DIMER, QUANTITATIVE - Normal    Narrative:     According to the assay 's published package insert, a normal (<0.50 MCGFEU/mL) D-dimer result in conjunction with a non-high clinical probability assessment, excludes deep vein thrombosis (DVT) and pulmonary embolism (PE) with high sensitivity.    D-dimer values increase with  "age and this can make VTE exclusion of an older population difficult. To address this, the [employer], based on best available evidence and recent guidelines, recommends that clinicians use age-adjusted D-dimer thresholds in patients greater than 50 years of age with: a) a low probability of PE who do not meet all Pulmonary Embolism Rule Out Criteria, or b) in those with intermediate probability of PE.   The formula for an age-adjusted D-dimer cut-off is \"age/100\".  For example, a 60 year old patient would have an age-adjusted cut-off of 0.60 MCGFEU/mL and an 80 year old 0.80 MCGFEU/mL.   BNP (IN-HOUSE) - Normal    Narrative:     This assay is used as an aid in the diagnosis of individuals suspected of having heart failure. It can be used as an aid in the diagnosis of acute decompensated heart failure (ADHF) in patients presenting with signs and symptoms of ADHF to the emergency department (ED). In addition, NT-proBNP of <300 pg/mL indicates ADHF is not likely.    Age Range Result Interpretation  NT-proBNP Concentration (pg/mL:      <50             Positive            >450                   Gray                 300-450                    Negative             <300    50-75           Positive            >900                  Gray                300-900                  Negative            <300      >75             Positive            >1800                  Gray                300-1800                  Negative            <300   LIPASE - Normal   RAINBOW DRAW    Narrative:     The following orders were created for panel order [place] Draw.  Procedure                               Abnormality         Status                     ---------                               -----------         ------                     Green Top (Gel)[[id number]]                                  Final result               Lavender Top[[id number]]                                     Final result               Gold Top - " SST[[id number]]                                   Final result               Light Blue Top[[id number]]                                   Final result                 Please view results for these tests on the individual orders.   HIGH SENSITIVITIY TROPONIN T 2HR    Narrative:     High Sensitive Troponin T Reference Range:  <14.0 ng/L- Negative Female for AMI  <22.0 ng/L- Negative Male for AMI  >=14 - Abnormal Female indicating possible myocardial injury.  >=22 - Abnormal Male indicating possible myocardial injury.   Clinicians would have to utilize clinical acumen, EKG, Troponin, and serial changes to determine if it is an Acute Myocardial Infarction or myocardial injury due to an underlying chronic condition.        BASIC METABOLIC PANEL   CBC (NO DIFF)   TROPONIN   HEMOGLOBIN AND HEMATOCRIT, BLOOD   POCT OCCULT BLOOD STOOL (ED ONLY)   CBC AND DIFFERENTIAL    Narrative:     The following orders were created for panel order CBC & Differential.  Procedure                               Abnormality         Status                     ---------                               -----------         ------                     CBC Auto Differential[[id number]]        Abnormal            Final result                 Please view results for these tests on the individual orders.   GREEN TOP   LAVENDER TOP   GOLD TOP - SST   LIGHT BLUE TOP       EKG:   ECG 12 Lead ED Triage Standing Order; Chest Pain   Preliminary Result   HEART RATE= 68  bpm   RR Interval= 882  ms   IA Interval= 197  ms   P Horizontal Axis= 22  deg   P Front Axis= 26  deg   QRSD Interval= 88  ms   QT Interval= 395  ms   QTcB= 421  ms   QRS Axis= -14  deg   T Wave Axis= 30  deg   - NORMAL ECG -   Sinus rhythm   Baseline wander in lead(s) V2   Electronically Signed By:    Date and Time of Study: 2024-04-09 22:35:10          Meds given in ED:   Medications   sodium chloride 0.9 % flush 10 mL (has no administration in time range)   aspirin tablet 325 mg (325 mg Oral Not  Given 4/9/24 2316)   sodium chloride 0.9 % flush 10 mL (has no administration in time range)   sodium chloride 0.9 % flush 10 mL (has no administration in time range)   sodium chloride 0.9 % flush 10 mL (has no administration in time range)   sodium chloride 0.9 % infusion 40 mL (has no administration in time range)   nitroglycerin (NITROSTAT) SL tablet 0.4 mg (has no administration in time range)   acetaminophen (TYLENOL) tablet 650 mg (has no administration in time range)     Or   acetaminophen (TYLENOL) 160 MG/5ML oral solution 650 mg (has no administration in time range)     Or   acetaminophen (TYLENOL) suppository 650 mg (has no administration in time range)   calcium carbonate (TUMS) chewable tablet 500 mg (200 mg elemental) (has no administration in time range)   pantoprazole (PROTONIX) injection 40 mg (has no administration in time range)   fentaNYL citrate (PF) (SUBLIMAZE) injection 50 mcg (50 mcg Intravenous Given 4/9/24 2330)   ondansetron (ZOFRAN) injection 4 mg (4 mg Intravenous Given 4/9/24 2330)   iopamidol (ISOVUE-370) 76 % injection 95 mL (95 mL Intravenous Given 4/10/24 0042)       Imaging results:  CT Angiogram Chest    Result Date: 4/10/2024  Electronically signed by Solo Pierre MD on 04-10-24 at 0104    CT Abdomen Pelvis With Contrast    Result Date: 4/10/2024  Electronically signed by Solo Pierre MD on 04-10-24 at 0103    XR Chest 1 View    Result Date: 4/10/2024  Electronically signed by Solo Pierre MD on 04-10-24 at 0043     Ambulatory status:   - ambulatory    Social issues:   Social History     Socioeconomic History    Marital status:    Tobacco Use    Smoking status: Never    Smokeless tobacco: Never   Vaping Use    Vaping status: Never Used   Substance and Sexual Activity    Alcohol use: Yes     Comment: OCCASIONAL    Drug use: No    Sexual activity: Defer       NIH Stroke Scale:       Zion Guerra RN  04/10/24 02:24 EDT         Electronically signed by Zion Guerra RN  at 04/10/24 0225       Marcellus Sandoval MD at 04/09/24 2242           EMERGENCY DEPARTMENT ENCOUNTER    Room Number:  23/23  Date of encounter:  4/10/2024  PCP: Servando Gao MD  Historian: Patient and daughter  Relevant information and history provided by sources other than the patient will be included below and in the ED Course.  Review of pertinent past medical records may also be included in record below and ED Course.    HPI:  Chief Complaint: Multiple complaints  A complete HPI/ROS/PMH/PSH/SH/FH are unobtainable due to: Not applicable  Context: Liya Carlson is a 38 y.o. female who presents to the ED c/o patient has multiple complaints.  Her first complaint consisted of problems swallowing.  This started the past week to 2 weeks.  She feels that when she eats she will get pain in the center of her chest and feels like her food does not go through.  She said this is progressed and she is no longer eating and is just drinking liquids.  This is a tightness and discomfort that is felt right at her chest bone in the lower portion of her sternum.  She also reports that she has another type of chest pain this been occurring for several weeks.  That is more of a tightness and a heavy weight on her chest.  That will come and go.  It does not occur on an every day basis but will occur about every other day.  It would last anywhere from 5 minutes to an hour or a couple hours.  She has a little bit of shortness of breath associated with chest this chest pain.  She is not aware of anything that makes this pain better or worse.  Is not worse with deep breathing is not worse with exertion.  I asked her if she thinks it is associated with her problems swallowing and the pain with swallowing solids.  She states that it could be but this pain does not always occur after eating.  She reports no fevers or chills or coughs or colds.  She also reports that she has had some blood in her stool.  This started about 3 days ago.   She is currently on Eliquis that she has had a history of PE.  The blood in the stool is bright red.  No black stool.  She is said she has had 3 stools today that have been bloody.  With clots.  Probably a small to moderate amount of blood she noticed the blood in the toilet.  She also reports that she has had abdominal pain that is come and go for several days to a week.  Not aware of anything that makes the pain in her abdomen worse or better.  When she stands up for the past couple of days she is felt weak and tired.  She describes this is dizziness.  It is not a spinning sensation it is more of a weakness and lightheadedness.  She denies any visual change, speech change or focal weakness to her arms or legs.  She states that she saw Dr. Gao a couple weeks ago for the chest pain.  She states that he had some monitor placed on her.  She is uncertain specifically what that monitor was.  She has no history of heart disease.  She has had a history of PE in the past and reports compliance with Eliquis.  She reports no fevers or chills or urinary symptoms.  This episode of chest pain that she is having right now started at about 6:00.  Started when she was at rest and has been constant.  It will vein wane and wax in severity.  Currently the pain she feels in her chest feels like a weight or heaviness on her chest.      Previous Episodes: No not entirely the same as previous episodes of pain in the past.  Current Symptoms: See above    MEDICAL HISTORY REVIEWED  In looking old records I can see this patient has had a history of a PE in the past and has been on Eliquis.  She has also had a history of seizures and migraines as well as pancreatitis in the past.  Has hyperlipidemia.      PAST MEDICAL HISTORY  Active Ambulatory Problems     Diagnosis Date Noted    Seizures     Pancreatitis     Gastric ulcer     Dyslipidemia 03/18/2016    Environmental allergies 03/18/2016    Intractable migraine without aura and without  status migrainosus 08/08/2017    Single subsegmental pulmonary embolism without acute cor pulmonale 09/08/2022    Abnormal CT scan, sigmoid colon 09/08/2022    Menorrhagia with regular cycle 05/08/2018    Muscle spasms of lower extremity 10/07/2022    COVID-19 virus infection 01/23/2023    Abdominal pain 09/23/2015     Resolved Ambulatory Problems     Diagnosis Date Noted    No Resolved Ambulatory Problems     Past Medical History:   Diagnosis Date    Chest wall contusion     Contusion, abdominal wall     Headache, tension-type     History of blood clots     Migraine     Pulmonary embolism          PAST SURGICAL HISTORY  Past Surgical History:   Procedure Laterality Date    LAPAROSCOPIC TUBAL LIGATION      SIGMOIDOSCOPY N/A 9/11/2022    Procedure: SIGMOIDOSCOPY FLEXIBLE TO DESCENDING COLON;  Surgeon: Sierra Kerr MD;  Location: [facility] ENDOSCOPY;  Service: Gastroenterology;  Laterality: N/A;  PRE- ABDOMINAL PAIN, ABNORMAL CT  POST- SMALL HEMORRHOIDS         FAMILY HISTORY  Family History   Problem Relation Age of Onset    Diabetes Mother     Hypertension Mother     Migraines Mother     Dementia Mother     Arthritis Mother     Kidney disease Mother     Colon polyps Father     Hypertension Father     Diabetes Father     Seizures Father     Stroke Father     Colon cancer Paternal Aunt     Colon polyps Paternal Aunt     Colon cancer Paternal Grandmother     Colon polyps Paternal Grandmother          SOCIAL HISTORY  Social History     Socioeconomic History    Marital status:    Tobacco Use    Smoking status: Never    Smokeless tobacco: Never   Vaping Use    Vaping status: Never Used   Substance and Sexual Activity    Alcohol use: Yes     Comment: OCCASIONAL    Drug use: No    Sexual activity: Defer         ALLERGIES  Patient has no known allergies.        REVIEW OF SYSTEMS  Review of Systems     All systems reviewed and negative except for those discussed in HPI.       PHYSICAL EXAM    I have reviewed the  triage vital signs and nursing notes.    ED Triage Vitals   Temp Heart Rate Resp BP SpO2   04/09/24 2229 04/09/24 2229 04/09/24 2229 04/09/24 2232 04/09/24 2229   99.2 °F (37.3 °C) 102 16 123/61 98 %      Temp src Heart Rate Source Patient Position BP Location FiO2 (%)   04/09/24 2229 04/09/24 2229 04/09/24 2232 04/09/24 2232 --   Tympanic Monitor Lying Right arm        GENERAL: Patient is in no acute cardiovascular respiratory distress..Vital signs on my initial evaluation have been reviewed.  O2 sat is 100% on room air.  Heart rate is normal on my exam in the 70s.  Blood pressure is normal and she is afebrile.  HENT: nares patent  Head/neck/ face are symmetric without gross deformity, signs of trauma, or swelling  EYES: no scleral icterus, no conjunctival pallor.  NECK: Supple, no meningismus  CV: regular rhythm, regular rate with intact distal pulses.  No murmur.  Pain is not reproducible with palpation or with deep breathing.  Location of pain she points to the lower sternum area and then spreads out under her left and right breast bilaterally.  RESPIRATORY: normal effort and no respiratory distress.  Clear to auscultation bilaterally  ABDOMEN: soft and  Morbidly obese.  Subjective tenderness on diffuse abdominal exam.  There is no guarding or rebound.  Rectal exam: No external hemorrhoids.  She has light brown stool with no black stool or gross blood.  [employer] prompt school test is negative for blood  MUSCULOSKELETAL: no deformity.  Intact distal pulses that are equal strong and symmetric.  No pain with active or passive range of motion to extremities.  NEURO: alert and appropriate, moves all extremities, follows commands.  No focal motor or sensory changes  SKIN: warm, dry    Vital signs and nursing notes reviewed.        LAB RESULTS  Recent Results (from the past 24 hour(s))   ECG 12 Lead ED Triage Standing Order; Chest Pain    Collection Time: 04/09/24 10:35 PM   Result Value Ref Range    QT Interval 395 ms     QTC Interval 421 ms   POC Occult Blood Stool    Collection Time: 04/09/24 11:11 PM    Specimen: Stool   Result Value Ref Range    Fecal Occult Blood Negative Negative    Lot Number [id number]     Expiration Date August 31, 2024     Positive Control Positive Positive    Negative Control Negative Negative   Comprehensive Metabolic Panel    Collection Time: 04/09/24 11:14 PM    Specimen: Blood   Result Value Ref Range    Glucose 108 (H) 65 - 99 mg/dL    BUN 9 6 - 20 mg/dL    Creatinine 0.93 0.57 - 1.00 mg/dL    Sodium 137 136 - 145 mmol/L    Potassium 3.9 3.5 - 5.2 mmol/L    Chloride 102 98 - 107 mmol/L    CO2 25.0 22.0 - 29.0 mmol/L    Calcium 9.1 8.6 - 10.5 mg/dL    Total Protein 6.8 6.0 - 8.5 g/dL    Albumin 3.9 3.5 - 5.2 g/dL    ALT (SGPT) 32 1 - 33 U/L    AST (SGOT) 19 1 - 32 U/L    Alkaline Phosphatase 69 39 - 117 U/L    Total Bilirubin 0.2 0.0 - 1.2 mg/dL    Globulin 2.9 gm/dL    A/G Ratio 1.3 g/dL    BUN/Creatinine Ratio 9.7 7.0 - 25.0    Anion Gap 10.0 5.0 - 15.0 mmol/L    eGFR 79.8 >60.0 mL/min/1.73   High Sensitivity Troponin T    Collection Time: 04/09/24 11:14 PM    Specimen: Blood   Result Value Ref Range    HS Troponin T <6 <14 ng/L   Green Top (Gel)    Collection Time: 04/09/24 11:14 PM   Result Value Ref Range    Extra Tube Hold for add-ons.    Lavender Top    Collection Time: 04/09/24 11:14 PM   Result Value Ref Range    Extra Tube hold for add-on    Gold Top - SST    Collection Time: 04/09/24 11:14 PM   Result Value Ref Range    Extra Tube Hold for add-ons.    Light Blue Top    Collection Time: 04/09/24 11:14 PM   Result Value Ref Range    Extra Tube Hold for add-ons.    CBC Auto Differential    Collection Time: 04/09/24 11:14 PM    Specimen: Blood   Result Value Ref Range    WBC 7.18 3.40 - 10.80 10*3/mm3    RBC 4.25 3.77 - 5.28 10*6/mm3    Hemoglobin 11.9 (L) 12.0 - 15.9 g/dL    Hematocrit 35.9 34.0 - 46.6 %    MCV 84.5 79.0 - 97.0 fL    MCH 28.0 26.6 - 33.0 pg    MCHC 33.1 31.5 - 35.7 g/dL    RDW 13.1  12.3 - 15.4 %    RDW-SD 40.0 37.0 - 54.0 fl    MPV 9.6 6.0 - 12.0 fL    Platelets 365 140 - 450 10*3/mm3    Neutrophil % 65.4 42.7 - 76.0 %    Lymphocyte % 26.2 19.6 - 45.3 %    Monocyte % 5.2 5.0 - 12.0 %    Eosinophil % 2.2 0.3 - 6.2 %    Basophil % 0.3 0.0 - 1.5 %    Immature Grans % 0.7 (H) 0.0 - 0.5 %    Neutrophils, Absolute 4.70 1.70 - 7.00 10*3/mm3    Lymphocytes, Absolute 1.88 0.70 - 3.10 10*3/mm3    Monocytes, Absolute 0.37 0.10 - 0.90 10*3/mm3    Eosinophils, Absolute 0.16 0.00 - 0.40 10*3/mm3    Basophils, Absolute 0.02 0.00 - 0.20 10*3/mm3    Immature Grans, Absolute 0.05 0.00 - 0.05 10*3/mm3    nRBC 0.0 0.0 - 0.2 /100 WBC   BNP    Collection Time: 04/09/24 11:14 PM    Specimen: Blood   Result Value Ref Range    proBNP 49.6 0.0 - 450.0 pg/mL   Lipase    Collection Time: 04/09/24 11:14 PM    Specimen: Blood   Result Value Ref Range    Lipase 16 13 - 60 U/L   D-dimer, Quantitative    Collection Time: 04/10/24  1:14 AM    Specimen: Blood   Result Value Ref Range    D-Dimer, Quantitative <0.27 0.00 - 0.50 MCGFEU/mL   High Sensitivity Troponin T 2Hr    Collection Time: 04/10/24  1:15 AM    Specimen: Blood   Result Value Ref Range    HS Troponin T <6 <14 ng/L    Troponin T Delta         Ordered the above labs and independently reviewed the results.        RADIOLOGY  CT Angiogram Chest    Result Date: 4/10/2024  Patient: TOSHIA AYERS  Time Out: 01:04 Exam(s): CTA CHEST EXAM:   CT Angiography Chest With Intravenous Contrast CLINICAL HISTORY:    Reason for exam: Chest pain and shortness of breath. Patient also does report dysphagia.. TECHNIQUE:   Axial computed tomographic angiography images of the chest with intravenous contrast.  CTDI is 3.57 mGy and DLP is 117.1 mGy-cm.  This CT exam was performed according to the principle of ALARA (As Low As Reasonably Achievable) by using one or more of the following dose reduction techniques: automated exposure control, adjustment of the mA and or kV according to  patient size, and or use of iterative reconstruction technique.   MIP reconstructed images were created and reviewed. COMPARISON:   No relevant prior studies available. FINDINGS:   Pulmonary arteries:  Unremarkable.  No pulmonary embolism.   Aorta:  No acute findings.  No thoracic aortic aneurysm.   Lungs:  Unremarkable.  No mass.  No consolidation.   Pleural space:  Unremarkable.  No significant effusion.  No pneumothorax.   Heart:  Unremarkable.  No cardiomegaly.  No significant pericardial effusion.  No evidence of RV dysfunction.   Bones joints:  No acute fracture.  No dislocation.   Soft tissues:  Unremarkable.   Lymph nodes:  Unremarkable.  No enlarged lymph nodes.   Liver:  Hepatic steatosis. IMPRESSION:       No acute findings in the visualized arteries of the chest.     Electronically signed by Solo Pierre MD on 04-10-24 at 0104    CT Abdomen Pelvis With Contrast    Result Date: 4/10/2024  Patient: TOSHIA AYERS  Time Out: 01:03 Exam(s): CT ABDOMEN + PELVIS With Contrast EXAM:   CT Abdomen and Pelvis With Intravenous Contrast CLINICAL HISTORY:    Reason for exam: Generalized abdominal pain with rectal bleeding. Patient is on Eliquis. TECHNIQUE:   Axial computed tomography images of the abdomen and pelvis with intravenous contrast.  CTDI is 13.44 mGy and DLP is 669.9 mGy-cm.  This CT exam was performed according to the principle of ALARA (As Low As Reasonably Achievable) by using one or more of the following dose reduction techniques: automated exposure control, adjustment of the mA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

## 2024-04-11 NOTE — PLAN OF CARE
Problem: Adult Inpatient Plan of Care  Goal: Plan of Care Review  Outcome: Ongoing, Not Progressing  Goal: Patient-Specific Goal (Individualized)  Outcome: Ongoing, Not Progressing  Goal: Absence of Hospital-Acquired Illness or Injury  Outcome: Ongoing, Not Progressing  Intervention: Identify and Manage Fall Risk  Description: Perform standard risk assessment on admission using a validated tool or comprehensive approach appropriate to the patient; reassess fall risk frequently, with change in status or transfer to another level of care.  Communicate fall injury risk to interprofessional healthcare team.  Determine need for increased observation, equipment and environmental modification, such as low bed, signage and supportive, nonskid footwear.  Adjust safety measures to individual developmental age, stage and identified risk factors.  Reinforce the importance of safety and physical activity with patient and family.  Perform regular intentional rounding to assess need for position change, pain assessment and personal needs, including assistance with toileting.  Intervention: Prevent Skin Injury  Description: Perform a screening for skin injury risk, such as pressure or moisture associated skin damage on admission and at regular intervals throughout hospital stay.  Keep all areas of skin (especially folds) clean and dry.  Maintain adequate skin hydration.  Relieve and redistribute pressure and protect bony prominences; implement measures based on patient-specific risk factors.  Match turning and repositioning schedule to clinical condition.  Encourage weight shift frequently; assist with reposition if unable to complete independently.  Float heels off bed; avoid pressure on the Achilles tendon.  Keep skin free from extended contact with medical devices.  Encourage functional activity and mobility, as early as tolerated.  Use aids (e.g., slide boards, mechanical lift) during transfer.  Intervention: Prevent and  Manage VTE (Venous Thromboembolism) Risk  Description: Assess for VTE (venous thromboembolism) risk.  Encourage and assist with early ambulation.  Initiate and maintain compression or other therapy, as indicated, based on identified risk in accordance with organizational protocol and provider order.  Encourage both active and passive leg exercises while in bed, if unable to ambulate.  Goal: Optimal Comfort and Wellbeing  Outcome: Ongoing, Not Progressing  Intervention: Monitor Pain and Promote Comfort  Description: Assess pain level, treatment efficacy and patient response at regular intervals using a consistent pain scale.  Consider the presence and impact of preexisting chronic pain.  Encourage patient and caregiver involvement in pain assessment, interventions and safety measures.  Intervention: Provide Person-Centered Care  Description: Use a family-focused approach to care.  Develop trust and rapport by proactively providing information, encouraging questions, addressing concerns and offering reassurance.  Acknowledge emotional response to hospitalization.  Recognize and utilize personal coping strategies.  Honor spiritual and cultural preferences.  Goal: Readiness for Transition of Care  Outcome: Ongoing, Not Progressing     Problem: Chest Pain  Goal: Resolution of Chest Pain Symptoms  Outcome: Ongoing, Not Progressing     Problem: Seizure Disorder Comorbidity  Goal: Maintenance of Seizure Control  Outcome: Ongoing, Not Progressing   Goal Outcome Evaluation:  Plan of Care Reviewed With: patient        Progress: no change                                   18

## 2024-04-11 NOTE — PAYOR COMM NOTE
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                ECG 12 Lead ED Triage Standing Order; Chest Pain   Preliminary Result   HEART RATE= 68  bpm   RR Interval= 882  ms   FL Interval= 197  ms   P Horizontal Axis= 22  deg   P Front Axis= 26  deg   QRSD Interval= 88  ms   QT Interval= 395  ms   QTcB= 421  ms   QRS Axis= -14  deg   T Wave Axis= 30  deg   - NORMAL ECG -   Sinus rhythm   Baseline wander in lead(s) V2   Electronically Signed By:    Date and Time of Study: 2024-04-09 22:35:10           Assessment/Plan     Active Hospital Problems    Diagnosis  POA    **Chest pain [R07.9]  Yes    History of pulmonary embolism [Z86.711]  Yes    Chronic anticoagulation [Z79.01]  Not Applicable    Rectal bleeding [K62.5]  Yes    Dysphagia [R13.10]  Yes    Factor V Leiden [D68.51]  Yes    Dyslipidemia [E78.5]  Yes    Seizures [R56.9]  Yes    Abdominal pain [R10.9]  Yes     Ms. Ayers is a 38 y.o. female that presented to the hospital with complaints of chest pain and rectal bleeding. She has a history of PE on Eliquis with Factor V Leiden.     Chest pain  -Atypical in nature and low suspicion for cardiac.  -Cardiology evaluated.  Enzymes negative as well as EKG.  -No plans for further ischemic cardiac workup.    Abdominal  pain  Rectal bleeding  Dysphagia  -Chest pain seems more epigastric in nature.  -Gastroenterology has been consulted.  -Hold Eliquis.  Would anticipate need for EGD and possible colonoscopy.  -Continue IV PPI and serial H/H checks.   -Will add low dose Norco for pain. Anti-emetics as needed.    Factor V Leiden  History of PE  -CTA chest negative for PE.  -Hold Eliquis temporarily.   -Followed by [facility] at baseline.    Seizures  -Resume home AED.  -Last seizure 7 years ago per her reports.  -Seizure precautions.    I discussed the patients findings and my recommendations with patient, nursing staff, and Dr. Corrales .    VTE Prophylaxis - SCDs. Resume Eliquis from home as soon as safely possible.  Code Status - Full code.       RAFIQ Hurt  [facility]  04/10/24  12:23 EDT      Electronically signed by Luis E Corrales MD at 04/10/24 1510          Emergency Department Notes        Zion Guerra RN at 04/10/24 0224          Nursing report ED to floor  Liya Carlson  38 y.o.  female    HPI :   Chief Complaint   Patient presents with    Chest Pain    Dizziness    Black or Bloody Stool       Admitting doctor:   Ishan Cordon MD    Admitting diagnosis:   The primary encounter diagnosis was Chest pain, unspecified type. Diagnoses of Dysphagia, unspecified type, Rectal bleed, Generalized abdominal pain, and Coagulopathy: Eliquis induced were also pertinent to this visit.    Code status:   Current Code Status       Date Active Code Status Order ID Comments User Context       4/10/2024 0221 CPR (Attempt to Resuscitate) [id number]  Nadja Hammonds APRN ED        Question Answer    Code Status (Patient has no pulse and is not breathing) CPR (Attempt to Resuscitate)    Medical Interventions (Patient has pulse or is breathing) Full Support                    Allergies:   Patient has no known allergies.    Isolation:   No active isolations    Intake and Output  No intake or  output data in the 24 hours ending 04/10/24 0224    Weight:       04/09/24 2229   Weight: 89.8 kg (198 lb)       Most recent vitals:   Vitals:    04/10/24 0031 04/10/24 0112 04/10/24 0113 04/10/24 0114   BP: 122/72      BP Location:       Patient Position:       Pulse: 81 88 91    Resp:       Temp:       TempSrc:       SpO2: 99% 99% 100% 99%   Weight:       Height:           Active LDAs/IV Access:   Lines, Drains & Airways       Active LDAs       Name Placement date Placement time Site Days    Peripheral IV 04/09/24 2313 Right Antecubital 04/09/24 2313  Antecubital  less than 1                    Labs (abnormal labs have a star):   Labs Reviewed   COMPREHENSIVE METABOLIC PANEL - Abnormal; Notable for the following components:       Result Value    Glucose 108 (*)     All other components within normal limits    Narrative:     GFR Normal >60  Chronic Kidney Disease <60  Kidney Failure <15     CBC WITH AUTO DIFFERENTIAL - Abnormal; Notable for the following components:    Hemoglobin 11.9 (*)     Immature Grans % 0.7 (*)     All other components within normal limits   TROPONIN - Normal    Narrative:     High Sensitive Troponin T Reference Range:  <14.0 ng/L- Negative Female for AMI  <22.0 ng/L- Negative Male for AMI  >=14 - Abnormal Female indicating possible myocardial injury.  >=22 - Abnormal Male indicating possible myocardial injury.   Clinicians would have to utilize clinical acumen, EKG, Troponin, and serial changes to determine if it is an Acute Myocardial Infarction or myocardial injury due to an underlying chronic condition.        D-DIMER, QUANTITATIVE - Normal    Narrative:     According to the assay 's published package insert, a normal (<0.50 MCGFEU/mL) D-dimer result in conjunction with a non-high clinical probability assessment, excludes deep vein thrombosis (DVT) and pulmonary embolism (PE) with high sensitivity.    D-dimer values increase with age and this can make VTE exclusion of an  "older population difficult. To address this, the [employer], based on best available evidence and recent guidelines, recommends that clinicians use age-adjusted D-dimer thresholds in patients greater than 50 years of age with: a) a low probability of PE who do not meet all Pulmonary Embolism Rule Out Criteria, or b) in those with intermediate probability of PE.   The formula for an age-adjusted D-dimer cut-off is \"age/100\".  For example, a 60 year old patient would have an age-adjusted cut-off of 0.60 MCGFEU/mL and an 80 year old 0.80 MCGFEU/mL.   BNP (IN-HOUSE) - Normal    Narrative:     This assay is used as an aid in the diagnosis of individuals suspected of having heart failure. It can be used as an aid in the diagnosis of acute decompensated heart failure (ADHF) in patients presenting with signs and symptoms of ADHF to the emergency department (ED). In addition, NT-proBNP of <300 pg/mL indicates ADHF is not likely.    Age Range Result Interpretation  NT-proBNP Concentration (pg/mL:      <50             Positive            >450                   Gray                 300-450                    Negative             <300    50-75           Positive            >900                  Gray                300-900                  Negative            <300      >75             Positive            >1800                  Gray                300-1800                  Negative            <300   LIPASE - Normal   RAINBOW DRAW    Narrative:     The following orders were created for panel order [place] Draw.  Procedure                               Abnormality         Status                     ---------                               -----------         ------                     Green Top (Gel)[[id number]]                                  Final result               Lavender Top[[id number]]                                     Final result               Gold Top - [facility][[id number]]                                   " Final result               Light Blue Top[[id number]]                                   Final result                 Please view results for these tests on the individual orders.   HIGH SENSITIVITIY TROPONIN T 2HR    Narrative:     High Sensitive Troponin T Reference Range:  <14.0 ng/L- Negative Female for AMI  <22.0 ng/L- Negative Male for AMI  >=14 - Abnormal Female indicating possible myocardial injury.  >=22 - Abnormal Male indicating possible myocardial injury.   Clinicians would have to utilize clinical acumen, EKG, Troponin, and serial changes to determine if it is an Acute Myocardial Infarction or myocardial injury due to an underlying chronic condition.        BASIC METABOLIC PANEL   CBC (NO DIFF)   TROPONIN   HEMOGLOBIN AND HEMATOCRIT, BLOOD   POCT OCCULT BLOOD STOOL (ED ONLY)   CBC AND DIFFERENTIAL    Narrative:     The following orders were created for panel order CBC & Differential.  Procedure                               Abnormality         Status                     ---------                               -----------         ------                     CBC Auto Differential[[id number]]        Abnormal            Final result                 Please view results for these tests on the individual orders.   GREEN TOP   LAVENDER TOP   GOLD TOP - SST   LIGHT BLUE TOP       EKG:   ECG 12 Lead ED Triage Standing Order; Chest Pain   Preliminary Result   HEART RATE= 68  bpm   RR Interval= 882  ms   NV Interval= 197  ms   P Horizontal Axis= 22  deg   P Front Axis= 26  deg   QRSD Interval= 88  ms   QT Interval= 395  ms   QTcB= 421  ms   QRS Axis= -14  deg   T Wave Axis= 30  deg   - NORMAL ECG -   Sinus rhythm   Baseline wander in lead(s) V2   Electronically Signed By:    Date and Time of Study: 2024-04-09 22:35:10          Meds given in ED:   Medications   sodium chloride 0.9 % flush 10 mL (has no administration in time range)   aspirin tablet 325 mg (325 mg Oral Not Given 4/9/24 8408)   sodium chloride 0.9 % flush  10 mL (has no administration in time range)   sodium chloride 0.9 % flush 10 mL (has no administration in time range)   sodium chloride 0.9 % flush 10 mL (has no administration in time range)   sodium chloride 0.9 % infusion 40 mL (has no administration in time range)   nitroglycerin (NITROSTAT) SL tablet 0.4 mg (has no administration in time range)   acetaminophen (TYLENOL) tablet 650 mg (has no administration in time range)     Or   acetaminophen (TYLENOL) 160 MG/5ML oral solution 650 mg (has no administration in time range)     Or   acetaminophen (TYLENOL) suppository 650 mg (has no administration in time range)   calcium carbonate (TUMS) chewable tablet 500 mg (200 mg elemental) (has no administration in time range)   pantoprazole (PROTONIX) injection 40 mg (has no administration in time range)   fentaNYL citrate (PF) (SUBLIMAZE) injection 50 mcg (50 mcg Intravenous Given 4/9/24 2330)   ondansetron (ZOFRAN) injection 4 mg (4 mg Intravenous Given 4/9/24 2330)   iopamidol (ISOVUE-370) 76 % injection 95 mL (95 mL Intravenous Given 4/10/24 0042)       Imaging results:  CT Angiogram Chest    Result Date: 4/10/2024  Electronically signed by Solo Pierre MD on 04-10-24 at 0104    CT Abdomen Pelvis With Contrast    Result Date: 4/10/2024  Electronically signed by Solo Pierre MD on 04-10-24 at 0103    XR Chest 1 View    Result Date: 4/10/2024  Electronically signed by Solo Pierre MD on 04-10-24 at 0043     Ambulatory status:   - ambulatory    Social issues:   Social History     Socioeconomic History    Marital status:    Tobacco Use    Smoking status: Never    Smokeless tobacco: Never   Vaping Use    Vaping status: Never Used   Substance and Sexual Activity    Alcohol use: Yes     Comment: OCCASIONAL    Drug use: No    Sexual activity: Defer       NIH Stroke Scale:       Zion Guerra RN  04/10/24 02:24 EDT         Electronically signed by Zion Guerra RN at 04/10/24 0225       Marcellus Sandoval MD at  04/09/24 2242           EMERGENCY DEPARTMENT ENCOUNTER    Room Number:  23/23  Date of encounter:  4/10/2024  PCP: Servando Gao MD  Historian: Patient and daughter  Relevant information and history provided by sources other than the patient will be included below and in the ED Course.  Review of pertinent past medical records may also be included in record below and ED Course.    HPI:  Chief Complaint: Multiple complaints  A complete HPI/ROS/PMH/PSH/SH/FH are unobtainable due to: Not applicable  Context: Liya Carlson is a 38 y.o. female who presents to the ED c/o patient has multiple complaints.  Her first complaint consisted of problems swallowing.  This started the past week to 2 weeks.  She feels that when she eats she will get pain in the center of her chest and feels like her food does not go through.  She said this is progressed and she is no longer eating and is just drinking liquids.  This is a tightness and discomfort that is felt right at her chest bone in the lower portion of her sternum.  She also reports that she has another type of chest pain this been occurring for several weeks.  That is more of a tightness and a heavy weight on her chest.  That will come and go.  It does not occur on an every day basis but will occur about every other day.  It would last anywhere from 5 minutes to an hour or a couple hours.  She has a little bit of shortness of breath associated with chest this chest pain.  She is not aware of anything that makes this pain better or worse.  Is not worse with deep breathing is not worse with exertion.  I asked her if she thinks it is associated with her problems swallowing and the pain with swallowing solids.  She states that it could be but this pain does not always occur after eating.  She reports no fevers or chills or coughs or colds.  She also reports that she has had some blood in her stool.  This started about 3 days ago.  She is currently on Eliquis that she has had a  history of PE.  The blood in the stool is bright red.  No black stool.  She is said she has had 3 stools today that have been bloody.  With clots.  Probably a small to moderate amount of blood she noticed the blood in the toilet.  She also reports that she has had abdominal pain that is come and go for several days to a week.  Not aware of anything that makes the pain in her abdomen worse or better.  When she stands up for the past couple of days she is felt weak and tired.  She describes this is dizziness.  It is not a spinning sensation it is more of a weakness and lightheadedness.  She denies any visual change, speech change or focal weakness to her arms or legs.  She states that she saw Dr. Gao a couple weeks ago for the chest pain.  She states that he had some monitor placed on her.  She is uncertain specifically what that monitor was.  She has no history of heart disease.  She has had a history of PE in the past and reports compliance with Eliquis.  She reports no fevers or chills or urinary symptoms.  This episode of chest pain that she is having right now started at about 6:00.  Started when she was at rest and has been constant.  It will vein wane and wax in severity.  Currently the pain she feels in her chest feels like a weight or heaviness on her chest.      Previous Episodes: No not entirely the same as previous episodes of pain in the past.  Current Symptoms: See above    MEDICAL HISTORY REVIEWED  In looking old records I can see this patient has had a history of a PE in the past and has been on Eliquis.  She has also had a history of seizures and migraines as well as pancreatitis in the past.  Has hyperlipidemia.      PAST MEDICAL HISTORY  Active Ambulatory Problems     Diagnosis Date Noted    Seizures     Pancreatitis     Gastric ulcer     Dyslipidemia 03/18/2016    Environmental allergies 03/18/2016    Intractable migraine without aura and without status migrainosus 08/08/2017    Single  subsegmental pulmonary embolism without acute cor pulmonale 09/08/2022    Abnormal CT scan, sigmoid colon 09/08/2022    Menorrhagia with regular cycle 05/08/2018    Muscle spasms of lower extremity 10/07/2022    COVID-19 virus infection 01/23/2023    Abdominal pain 09/23/2015     Resolved Ambulatory Problems     Diagnosis Date Noted    No Resolved Ambulatory Problems     Past Medical History:   Diagnosis Date    Chest wall contusion     Contusion, abdominal wall     Headache, tension-type     History of blood clots     Migraine     Pulmonary embolism          PAST SURGICAL HISTORY  Past Surgical History:   Procedure Laterality Date    LAPAROSCOPIC TUBAL LIGATION      SIGMOIDOSCOPY N/A 9/11/2022    Procedure: SIGMOIDOSCOPY FLEXIBLE TO DESCENDING COLON;  Surgeon: Sierra Kerr MD;  Location: [facility] ENDOSCOPY;  Service: Gastroenterology;  Laterality: N/A;  PRE- ABDOMINAL PAIN, ABNORMAL CT  POST- SMALL HEMORRHOIDS         FAMILY HISTORY  Family History   Problem Relation Age of Onset    Diabetes Mother     Hypertension Mother     Migraines Mother     Dementia Mother     Arthritis Mother     Kidney disease Mother     Colon polyps Father     Hypertension Father     Diabetes Father     Seizures Father     Stroke Father     Colon cancer Paternal Aunt     Colon polyps Paternal Aunt     Colon cancer Paternal Grandmother     Colon polyps Paternal Grandmother          SOCIAL HISTORY  Social History     Socioeconomic History    Marital status:    Tobacco Use    Smoking status: Never    Smokeless tobacco: Never   Vaping Use    Vaping status: Never Used   Substance and Sexual Activity    Alcohol use: Yes     Comment: OCCASIONAL    Drug use: No    Sexual activity: Defer         ALLERGIES  Patient has no known allergies.        REVIEW OF SYSTEMS  Review of Systems     All systems reviewed and negative except for those discussed in HPI.       PHYSICAL EXAM    I have reviewed the triage vital signs and nursing notes.    ED  Triage Vitals   Temp Heart Rate Resp BP SpO2   04/09/24 2229 04/09/24 2229 04/09/24 2229 04/09/24 2232 04/09/24 2229   99.2 °F (37.3 °C) 102 16 123/61 98 %      Temp src Heart Rate Source Patient Position BP Location FiO2 (%)   04/09/24 2229 04/09/24 2229 04/09/24 2232 04/09/24 2232 --   Tympanic Monitor Lying Right arm        GENERAL: Patient is in no acute cardiovascular respiratory distress..Vital signs on my initial evaluation have been reviewed.  O2 sat is 100% on room air.  Heart rate is normal on my exam in the 70s.  Blood pressure is normal and she is afebrile.  HENT: nares patent  Head/neck/ face are symmetric without gross deformity, signs of trauma, or swelling  EYES: no scleral icterus, no conjunctival pallor.  NECK: Supple, no meningismus  CV: regular rhythm, regular rate with intact distal pulses.  No murmur.  Pain is not reproducible with palpation or with deep breathing.  Location of pain she points to the lower sternum area and then spreads out under her left and right breast bilaterally.  RESPIRATORY: normal effort and no respiratory distress.  Clear to auscultation bilaterally  ABDOMEN: soft and  Morbidly obese.  Subjective tenderness on diffuse abdominal exam.  There is no guarding or rebound.  Rectal exam: No external hemorrhoids.  She has light brown stool with no black stool or gross blood.  [employer]ema prompt school test is negative for blood  MUSCULOSKELETAL: no deformity.  Intact distal pulses that are equal strong and symmetric.  No pain with active or passive range of motion to extremities.  NEURO: alert and appropriate, moves all extremities, follows commands.  No focal motor or sensory changes  SKIN: warm, dry    Vital signs and nursing notes reviewed.        LAB RESULTS  Recent Results (from the past 24 hour(s))   ECG 12 Lead ED Triage Standing Order; Chest Pain    Collection Time: 04/09/24 10:35 PM   Result Value Ref Range    QT Interval 395 ms    QTC Interval 421 ms   POC Occult Blood  Stool    Collection Time: 04/09/24 11:11 PM    Specimen: Stool   Result Value Ref Range    Fecal Occult Blood Negative Negative    Lot Number [id number]     Expiration Date August 31, 2024     Positive Control Positive Positive    Negative Control Negative Negative   Comprehensive Metabolic Panel    Collection Time: 04/09/24 11:14 PM    Specimen: Blood   Result Value Ref Range    Glucose 108 (H) 65 - 99 mg/dL    BUN 9 6 - 20 mg/dL    Creatinine 0.93 0.57 - 1.00 mg/dL    Sodium 137 136 - 145 mmol/L    Potassium 3.9 3.5 - 5.2 mmol/L    Chloride 102 98 - 107 mmol/L    CO2 25.0 22.0 - 29.0 mmol/L    Calcium 9.1 8.6 - 10.5 mg/dL    Total Protein 6.8 6.0 - 8.5 g/dL    Albumin 3.9 3.5 - 5.2 g/dL    ALT (SGPT) 32 1 - 33 U/L    AST (SGOT) 19 1 - 32 U/L    Alkaline Phosphatase 69 39 - 117 U/L    Total Bilirubin 0.2 0.0 - 1.2 mg/dL    Globulin 2.9 gm/dL    A/G Ratio 1.3 g/dL    BUN/Creatinine Ratio 9.7 7.0 - 25.0    Anion Gap 10.0 5.0 - 15.0 mmol/L    eGFR 79.8 >60.0 mL/min/1.73   High Sensitivity Troponin T    Collection Time: 04/09/24 11:14 PM    Specimen: Blood   Result Value Ref Range    HS Troponin T <6 <14 ng/L   Green Top (Gel)    Collection Time: 04/09/24 11:14 PM   Result Value Ref Range    Extra Tube Hold for add-ons.    Lavender Top    Collection Time: 04/09/24 11:14 PM   Result Value Ref Range    Extra Tube hold for add-on    Gold Top - SST    Collection Time: 04/09/24 11:14 PM   Result Value Ref Range    Extra Tube Hold for add-ons.    Light Blue Top    Collection Time: 04/09/24 11:14 PM   Result Value Ref Range    Extra Tube Hold for add-ons.    CBC Auto Differential    Collection Time: 04/09/24 11:14 PM    Specimen: Blood   Result Value Ref Range    WBC 7.18 3.40 - 10.80 10*3/mm3    RBC 4.25 3.77 - 5.28 10*6/mm3    Hemoglobin 11.9 (L) 12.0 - 15.9 g/dL    Hematocrit 35.9 34.0 - 46.6 %    MCV 84.5 79.0 - 97.0 fL    MCH 28.0 26.6 - 33.0 pg    MCHC 33.1 31.5 - 35.7 g/dL    RDW 13.1 12.3 - 15.4 %    RDW-SD 40.0 37.0 - 54.0  fl    MPV 9.6 6.0 - 12.0 fL    Platelets 365 140 - 450 10*3/mm3    Neutrophil % 65.4 42.7 - 76.0 %    Lymphocyte % 26.2 19.6 - 45.3 %    Monocyte % 5.2 5.0 - 12.0 %    Eosinophil % 2.2 0.3 - 6.2 %    Basophil % 0.3 0.0 - 1.5 %    Immature Grans % 0.7 (H) 0.0 - 0.5 %    Neutrophils, Absolute 4.70 1.70 - 7.00 10*3/mm3    Lymphocytes, Absolute 1.88 0.70 - 3.10 10*3/mm3    Monocytes, Absolute 0.37 0.10 - 0.90 10*3/mm3    Eosinophils, Absolute 0.16 0.00 - 0.40 10*3/mm3    Basophils, Absolute 0.02 0.00 - 0.20 10*3/mm3    Immature Grans, Absolute 0.05 0.00 - 0.05 10*3/mm3    nRBC 0.0 0.0 - 0.2 /100 WBC   BNP    Collection Time: 04/09/24 11:14 PM    Specimen: Blood   Result Value Ref Range    proBNP 49.6 0.0 - 450.0 pg/mL   Lipase    Collection Time: 04/09/24 11:14 PM    Specimen: Blood   Result Value Ref Range    Lipase 16 13 - 60 U/L   D-dimer, Quantitative    Collection Time: 04/10/24  1:14 AM    Specimen: Blood   Result Value Ref Range    D-Dimer, Quantitative <0.27 0.00 - 0.50 MCGFEU/mL   High Sensitivity Troponin T 2Hr    Collection Time: 04/10/24  1:15 AM    Specimen: Blood   Result Value Ref Range    HS Troponin T <6 <14 ng/L    Troponin T Delta         Ordered the above labs and independently reviewed the results.        RADIOLOGY  CT Angiogram Chest    Result Date: 4/10/2024  Patient: TOSHIA AYERS  Time Out: 01:04 Exam(s): CTA CHEST EXAM:   CT Angiography Chest With Intravenous Contrast CLINICAL HISTORY:    Reason for exam: Chest pain and shortness of breath. Patient also does report dysphagia.. TECHNIQUE:   Axial computed tomographic angiography images of the chest with intravenous contrast.  CTDI is 3.57 mGy and DLP is 117.1 mGy-cm.  This CT exam was performed according to the principle of ALARA (As Low As Reasonably Achievable) by using one or more of the following dose reduction techniques: automated exposure control, adjustment of the mA and or kV according to patient size, and or use of iterative  reconstruction technique.   MIP reconstructed images were created and reviewed. COMPARISON:   No relevant prior studies available. FINDINGS:   Pulmonary arteries:  Unremarkable.  No pulmonary embolism.   Aorta:  No acute findings.  No thoracic aortic aneurysm.   Lungs:  Unremarkable.  No mass.  No consolidation.   Pleural space:  Unremarkable.  No significant effusion.  No pneumothorax.   Heart:  Unremarkable.  No cardiomegaly.  No significant pericardial effusion.  No evidence of RV dysfunction.   Bones joints:  No acute fracture.  No dislocation.   Soft tissues:  Unremarkable.   Lymph nodes:  Unremarkable.  No enlarged lymph nodes.   Liver:  Hepatic steatosis. IMPRESSION:       No acute findings in the visualized arteries of the chest.     Electronically signed by Solo Pierre MD on 04-10-24 at 0104    CT Abdomen Pelvis With Contrast    Result Date: 4/10/2024  Patient: TOSHIA AYERS  Time Out: 01:03 Exam(s): CT ABDOMEN + PELVIS With Contrast EXAM:   CT Abdomen and Pelvis With Intravenous Contrast CLINICAL HISTORY:    Reason for exam: Generalized abdominal pain with rectal bleeding. Patient is on Eliquis. TECHNIQUE:   Axial computed tomography images of the abdomen and pelvis with intravenous contrast.  CTDI is 13.44 mGy and DLP is 669.9 mGy-cm.  This CT exam was performed according to the principle of ALARA (As Low As Reasonably Achievable) by using one or more of the following dose reduction techniques: automated exposure control, adjustment of the mA and or kV according to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

## 2024-04-11 NOTE — PROGRESS NOTES
Hospital Follow Up    LOS:  LOS: 1 day   Patient Name: Liya Carlson  Age/Sex: 38 y.o. female  : 1985  MRN: [id number]    Day of Service: 24   Length of Stay: 1  Encounter Provider: RAFIQ Jackson  Place of Service: [facility] CARDIOLOGY  Patient Care Team:  Servando Gao MD as PCP - General (Internal Medicine)    Subjective:     Chief Complaint: follow up chest pain    Interval History: chest pain persists but has not gotten worse. No shortness of breath reported.    Objective:     Objective:  Temp:  [97.7 °F (36.5 °C)-98.2 °F (36.8 °C)] 98.2 °F (36.8 °C)  Heart Rate:  [63-79] 76  Resp:  [16] 16  BP: (100-123)/(47-69) 101/56     Intake/Output Summary (Last 24 hours) at 2024 0829  Last data filed at 4/10/2024 1300  Gross per 24 hour   Intake 0 ml   Output --   Net 0 ml     Body mass index is 31.01 kg/m².      24  2229   Weight: 89.8 kg (198 lb)     Weight change:     Physical Exam:   General Appearance:    Awake alert and oriented in no acute distress.   Color:  Skin:  Neuro:  HEENT:    Lungs:     Pink  Warm and dry  No focal, motor or sensory deficits  Neck supple, pupils equal, round and reactive. No JVD, No Bruit  Clear to auscultation,respirations regular, even and                  unlabored    Heart:    Regular rate and rhythm, S1 and S2, no murmur, no gallop, no rub. No edema, DP/PT pulses are 2+   Chest Wall:    No abnormalities observed   Abdomen:     Normal bowel sounds, no masses, no organomegaly, soft        non-tender, non-distended, no guarding, no ascites noted   Extremities:   Moves all extremities well, no edema, no cyanosis, no redness       Lab Review:   Results from last 7 days   Lab Units 24  0501 04/10/24  0422 24  2314   SODIUM mmol/L 137 136 137   POTASSIUM mmol/L 3.9 3.7 3.9   CHLORIDE mmol/L 104 101 102   CO2 mmol/L 23.9 25.0 25.0   BUN mg/dL 8 8 9   CREATININE mg/dL 0.81 0.76 0.93   GLUCOSE mg/dL 128* 104* 108*  "  CALCIUM mg/dL 8.8 9.1 9.1   AST (SGOT) U/L  --   --  19   ALT (SGPT) U/L  --   --  32     Results from last 7 days   Lab Units 04/10/24  0422 04/10/24  0115 04/09/24  2314   HSTROP T ng/L <6 <6 <6     Results from last 7 days   Lab Units 04/11/24  0501 04/10/24  2351 04/10/24  1545 04/10/24  0422   WBC 10*3/mm3 4.55  --   --  6.69   HEMOGLOBIN g/dL 11.8* 11.2*   < > 12.2   HEMATOCRIT % 37.7 34.7   < > 36.9   PLATELETS 10*3/mm3 360  --   --  356    < > = values in this interval not displayed.                   Invalid input(s): \"LDLCALC\"  Results from last 7 days   Lab Units 04/09/24  2314   PROBNP pg/mL 49.6         I reviewed the patient's new clinical results.  I personally viewed and interpreted the patient's EKG  Current Medications:   Scheduled Meds:amitriptyline, 75 mg, Oral, Nightly  brivaracetam, 100 mg, Oral, BID  busPIRone, 10 mg, Oral, TID  carvedilol, 3.125 mg, Oral, BID With Meals  fluticasone, 2 spray, Nasal, Daily  pantoprazole, 40 mg, Intravenous, Q12H  sodium chloride, 10 mL, Intravenous, Q12H      Continuous Infusions:sodium chloride, 75 mL/hr, Last Rate: 75 mL/hr (04/11/24 0549)        Allergies:  No Known Allergies    Assessment:       Chest pain    Seizures    Dyslipidemia    Abdominal pain    History of pulmonary embolism    Chronic anticoagulation    Rectal bleeding    Dysphagia    Factor V Leiden        Plan:   1.  Abdominal/lower chest pain.  I think her symptoms are atypical for angina.  I think her presentation is more likely due to a GI issue.  This is supported by normal troponins and EKG despite persistent pain.  2.  Dysphagia  3.  Nausea/vomiting  4.  Bright red blood per rectum  5.  History of pulmonary embolism.  Normally on chronic anticoagulation with apixaban.  D-dimer and CT angiogram of the chest this admission were normal.      -plans for EGD and colonoscopy tomorrow. Eliquis on hold. Resume when ok with GI.  -Very low suspicion for coronary disease given normal troponin and " EKG. No further cardiac workup indicated at this time.  -she says she is on clonidine 0.3 mg BID at home and is asking why she is not getting this here. I have reviewed several recent office visit notes as well as past prescriptions and the only medication I see for BP is carvedilol which she is on here. Her BP is stable. I am not going to make any changes  -Also requesting that her atarax be increased to 50 mg and her Ambien reordered.  Will defer to attending.  -Will see on an as needed basis. Please call with any questions or concerns.    RAFIQ Jackson  04/11/24  08:29 EDT  Electronically signed by RAFIQ Jackson, 04/11/24, 8:29 AM EDT.

## 2024-04-11 NOTE — PROGRESS NOTES
Name: Liya Carlson ADMIT: 2024   : 1985  PCP: Servando Gao MD    MRN: [id number] LOS: 1 days   AGE/SEX: 38 y.o. female  ROOM: [facility]     Subjective   Subjective   Resting in bed. Family member x 1 at bedside. Notified by nursing this morning of wish to shower as well as Zofran not effective. Patient switched to compazine with some improvement.  Had dry heaving overnight but no vomiting.  Tolerating clear liquids okay.  Still having issues with swallowing.  Denies any trouble breathing, but continues with chest pain that is essentially the same as yesterday.  She had 1 bowel movement yesterday that was bloody per her reports.  Plans for clear liquid diet and scopes tomorrow.     Objective   Objective   Vital Signs  Temp:  [97.7 °F (36.5 °C)-98.2 °F (36.8 °C)] 98.2 °F (36.8 °C)  Heart Rate:  [63-79] 76  Resp:  [16] 16  BP: (100-123)/(47-69) 101/56  SpO2:  [96 %-98 %] 97 %  on   ;   Device (Oxygen Therapy): room air  Body mass index is 31.01 kg/m².    Physical Exam  Vitals and nursing note reviewed.   Constitutional:       Appearance: She is ill-appearing. She is not toxic-appearing.   Cardiovascular:      Rate and Rhythm: Normal rate and regular rhythm.      Pulses: Normal pulses.   Pulmonary:      Effort: Pulmonary effort is normal. No respiratory distress.      Breath sounds: Normal breath sounds.   Abdominal:      General: Bowel sounds are normal. There is no distension.      Palpations: Abdomen is soft.      Tenderness: There is abdominal tenderness.   Musculoskeletal:      Cervical back: Normal range of motion and neck supple.   Skin:     General: Skin is warm and dry.      Findings: No bruising.   Neurological:      Mental Status: She is alert and oriented to person, place, and time.      Sensory: No sensory deficit.      Coordination: Coordination normal.   Psychiatric:         Mood and Affect: Mood normal.         Behavior: Behavior normal.     Results Review:       I reviewed the  "patient's new clinical results.  Results from last 7 days   Lab Units 04/11/24  0501 04/10/24  2351 04/10/24  1545 04/10/24  0422 04/09/24  2314   WBC 10*3/mm3 4.55  --   --  6.69 7.18   HEMOGLOBIN g/dL 11.8* 11.2* 11.5* 12.2 11.9*   PLATELETS 10*3/mm3 360  --   --  356 365     Results from last 7 days   Lab Units 04/11/24  0501 04/10/24  0422 04/09/24  2314   SODIUM mmol/L 137 136 137   POTASSIUM mmol/L 3.9 3.7 3.9   CHLORIDE mmol/L 104 101 102   CO2 mmol/L 23.9 25.0 25.0   BUN mg/dL 8 8 9   CREATININE mg/dL 0.81 0.76 0.93   GLUCOSE mg/dL 128* 104* 108*   Estimated Creatinine Clearance: 106.3 mL/min (by C-G formula based on SCr of 0.81 mg/dL).  Results from last 7 days   Lab Units 04/09/24  2314   ALBUMIN g/dL 3.9   BILIRUBIN mg/dL 0.2   ALK PHOS U/L 69   AST (SGOT) U/L 19   ALT (SGPT) U/L 32     Results from last 7 days   Lab Units 04/11/24  0501 04/10/24  0422 04/09/24  2314   CALCIUM mg/dL 8.8 9.1 9.1   ALBUMIN g/dL  --   --  3.9       No results found for: \"HGBA1C\", \"POCGLU\"    amitriptyline, 75 mg, Oral, Nightly  brivaracetam, 100 mg, Oral, BID  busPIRone, 10 mg, Oral, TID  carvedilol, 3.125 mg, Oral, BID With Meals  fluticasone, 2 spray, Nasal, Daily  pantoprazole, 40 mg, Intravenous, Q12H  sodium chloride, 10 mL, Intravenous, Q12H      sodium chloride, 75 mL/hr, Last Rate: 75 mL/hr (04/11/24 0549)    Diet: Liquid; Clear Liquid; Fluid Consistency: Thin (IDDSI 0)       Assessment/Plan     Active Hospital Problems    Diagnosis  POA    **Chest pain [R07.9]  Yes    Anxiety disorder [F41.9]  Yes    History of pulmonary embolism [Z86.711]  Yes    Chronic anticoagulation [Z79.01]  Not Applicable    Rectal bleeding [K62.5]  Yes    Dysphagia [R13.10]  Yes    Factor V Leiden [D68.51]  Yes    Dyslipidemia [E78.5]  Yes    Seizures [R56.9]  Yes    Abdominal pain [R10.9]  Yes      Resolved Hospital Problems   No resolved problems to display.     Ms. Carlson is a 38 y.o. female that presented to the hospital with complaints " of chest pain and rectal bleeding. She has a history of PE on Eliquis with Factor V Leiden.      Chest pain  -Atypical in nature and low suspicion for cardiac.  -Cardiology evaluated.  Enzymes negative as well as EKG.  -No plans for further ischemic cardiac workup.  -Patient reported to cardiology that she was on clonidine PTA, but previous office notes did not indicate she is on this medication from their review. They have recommended to continue Coreg as is for the time being. HR and BP very stable.     Abdominal pain  Rectal bleeding  Dysphagia  -Chest pain seems more epigastric in nature.  -Gastroenterology has been consulted.  -Hold Eliquis.  Plans for EGD and colonoscopy tomorrow.  -Continue IV PPI and serial H/H checks.   -Low dose Norco for pain. Anti-emetics as needed- Zofran changed to compazine.     Factor V Leiden  History of PE  -CTA chest negative for PE.  -Hold Eliquis temporarily.   -Followed by [facility] at baseline.     Seizures  -Continue home AED.  -Last seizure 7 years ago per her reports.  -Seizure precautions.    Anxiety disorder  -Home Buspar and hydroxyzine resumed. Appears she is taking 2 tablets of hydroxyzine as needed at home. Will adjust. She apparently asked cardiology for Ambien resumption. I cannot see this on PDMP so will ask pharmacy to run a SINDY. Regardless, I favor holding this for now given need for bowel prep this evening.     I discussed the patients findings and my recommendations with patient and nurse.     VTE Prophylaxis - SCDs. Resume Eliquis from home as soon as safely possible.  Code Status - Full code.  Disposition - Anticipate discharge TBD.      RAFIQ Hurt  [facility]  04/11/24  12:00 EDT

## 2024-04-12 ENCOUNTER — ANESTHESIA EVENT (OUTPATIENT)
Dept: GASTROENTEROLOGY | Facility: HOSPITAL | Age: 39
End: 2024-04-12
Payer: COMMERCIAL

## 2024-04-12 ENCOUNTER — ANESTHESIA (OUTPATIENT)
Dept: GASTROENTEROLOGY | Facility: HOSPITAL | Age: 39
End: 2024-04-12
Payer: COMMERCIAL

## 2024-04-12 VITALS
TEMPERATURE: 97.7 F | SYSTOLIC BLOOD PRESSURE: 143 MMHG | HEART RATE: 70 BPM | BODY MASS INDEX: 31.08 KG/M2 | OXYGEN SATURATION: 100 % | DIASTOLIC BLOOD PRESSURE: 88 MMHG | HEIGHT: 67 IN | RESPIRATION RATE: 14 BRPM | WEIGHT: 198 LBS

## 2024-04-12 PROBLEM — R13.10 DYSPHAGIA: Status: RESOLVED | Noted: 2024-04-10 | Resolved: 2024-04-12

## 2024-04-12 LAB
ANION GAP SERPL CALCULATED.3IONS-SCNC: 10.3 MMOL/L (ref 5–15)
B-HCG UR QL: NEGATIVE
BUN SERPL-MCNC: 6 MG/DL (ref 6–20)
BUN/CREAT SERPL: 8 (ref 7–25)
CALCIUM SPEC-SCNC: 8.8 MG/DL (ref 8.6–10.5)
CHLORIDE SERPL-SCNC: 107 MMOL/L (ref 98–107)
CO2 SERPL-SCNC: 20.7 MMOL/L (ref 22–29)
CREAT SERPL-MCNC: 0.75 MG/DL (ref 0.57–1)
DEPRECATED RDW RBC AUTO: 38.9 FL (ref 37–54)
EGFRCR SERPLBLD CKD-EPI 2021: 103.4 ML/MIN/1.73
ERYTHROCYTE [DISTWIDTH] IN BLOOD BY AUTOMATED COUNT: 12.7 % (ref 12.3–15.4)
EXPIRATION DATE: NORMAL
GLUCOSE SERPL-MCNC: 115 MG/DL (ref 65–99)
HCT VFR BLD AUTO: 33.7 % (ref 34–46.6)
HGB BLD-MCNC: 11.2 G/DL (ref 12–15.9)
INTERNAL NEGATIVE CONTROL: NORMAL
INTERNAL POSITIVE CONTROL: NORMAL
Lab: NORMAL
MCH RBC QN AUTO: 28.3 PG (ref 26.6–33)
MCHC RBC AUTO-ENTMCNC: 33.2 G/DL (ref 31.5–35.7)
MCV RBC AUTO: 85.1 FL (ref 79–97)
PLATELET # BLD AUTO: 328 10*3/MM3 (ref 140–450)
PMV BLD AUTO: 9.6 FL (ref 6–12)
POTASSIUM SERPL-SCNC: 4 MMOL/L (ref 3.5–5.2)
RBC # BLD AUTO: 3.96 10*6/MM3 (ref 3.77–5.28)
SODIUM SERPL-SCNC: 138 MMOL/L (ref 136–145)
WBC NRBC COR # BLD AUTO: 5.3 10*3/MM3 (ref 3.4–10.8)

## 2024-04-12 PROCEDURE — 85027 COMPLETE CBC AUTOMATED: CPT | Performed by: NURSE PRACTITIONER

## 2024-04-12 PROCEDURE — 0DB58ZX EXCISION OF ESOPHAGUS, VIA NATURAL OR ARTIFICIAL OPENING ENDOSCOPIC, DIAGNOSTIC: ICD-10-PCS | Performed by: INTERNAL MEDICINE

## 2024-04-12 PROCEDURE — 25010000002 PROPOFOL 10 MG/ML EMULSION: Performed by: ANESTHESIOLOGY

## 2024-04-12 PROCEDURE — 25010000002 PROCHLORPERAZINE 10 MG/2ML SOLUTION: Performed by: NURSE PRACTITIONER

## 2024-04-12 PROCEDURE — 0DBP8ZZ EXCISION OF RECTUM, VIA NATURAL OR ARTIFICIAL OPENING ENDOSCOPIC: ICD-10-PCS | Performed by: INTERNAL MEDICINE

## 2024-04-12 PROCEDURE — 0DB68ZX EXCISION OF STOMACH, VIA NATURAL OR ARTIFICIAL OPENING ENDOSCOPIC, DIAGNOSTIC: ICD-10-PCS | Performed by: INTERNAL MEDICINE

## 2024-04-12 PROCEDURE — 88305 TISSUE EXAM BY PATHOLOGIST: CPT | Performed by: INTERNAL MEDICINE

## 2024-04-12 PROCEDURE — 80048 BASIC METABOLIC PNL TOTAL CA: CPT | Performed by: NURSE PRACTITIONER

## 2024-04-12 PROCEDURE — 96376 TX/PRO/DX INJ SAME DRUG ADON: CPT

## 2024-04-12 PROCEDURE — 45380 COLONOSCOPY AND BIOPSY: CPT | Performed by: INTERNAL MEDICINE

## 2024-04-12 PROCEDURE — 25810000003 LACTATED RINGERS PER 1000 ML: Performed by: INTERNAL MEDICINE

## 2024-04-12 PROCEDURE — 43239 EGD BIOPSY SINGLE/MULTIPLE: CPT | Performed by: INTERNAL MEDICINE

## 2024-04-12 PROCEDURE — 81025 URINE PREGNANCY TEST: CPT | Performed by: INTERNAL MEDICINE

## 2024-04-12 PROCEDURE — 88312 SPECIAL STAINS GROUP 1: CPT | Performed by: INTERNAL MEDICINE

## 2024-04-12 RX ORDER — PROPOFOL 10 MG/ML
VIAL (ML) INTRAVENOUS AS NEEDED
Status: DISCONTINUED | OUTPATIENT
Start: 2024-04-12 | End: 2024-04-12 | Stop reason: SURG

## 2024-04-12 RX ORDER — SODIUM CHLORIDE 9 MG/ML
30 INJECTION, SOLUTION INTRAVENOUS CONTINUOUS PRN
Status: DISCONTINUED | OUTPATIENT
Start: 2024-04-12 | End: 2024-04-12 | Stop reason: HOSPADM

## 2024-04-12 RX ORDER — SODIUM CHLORIDE, SODIUM LACTATE, POTASSIUM CHLORIDE, CALCIUM CHLORIDE 600; 310; 30; 20 MG/100ML; MG/100ML; MG/100ML; MG/100ML
30 INJECTION, SOLUTION INTRAVENOUS CONTINUOUS PRN
Status: DISCONTINUED | OUTPATIENT
Start: 2024-04-12 | End: 2024-04-12 | Stop reason: HOSPADM

## 2024-04-12 RX ORDER — SUCRALFATE 1 G/1
1 TABLET ORAL
Status: DISCONTINUED | OUTPATIENT
Start: 2024-04-12 | End: 2024-04-12 | Stop reason: HOSPADM

## 2024-04-12 RX ORDER — PANTOPRAZOLE SODIUM 40 MG/1
40 TABLET, DELAYED RELEASE ORAL
Status: DISCONTINUED | OUTPATIENT
Start: 2024-04-12 | End: 2024-04-12 | Stop reason: HOSPADM

## 2024-04-12 RX ORDER — SUCRALFATE 1 G/1
1 TABLET ORAL
Qty: 60 TABLET | Refills: 0 | Status: SHIPPED | OUTPATIENT
Start: 2024-04-12 | End: 2024-04-27

## 2024-04-12 RX ORDER — PANTOPRAZOLE SODIUM 40 MG/1
40 TABLET, DELAYED RELEASE ORAL
Qty: 60 TABLET | Refills: 0 | Status: SHIPPED | OUTPATIENT
Start: 2024-04-12 | End: 2024-06-11

## 2024-04-12 RX ORDER — LIDOCAINE HYDROCHLORIDE 20 MG/ML
INJECTION, SOLUTION INFILTRATION; PERINEURAL AS NEEDED
Status: DISCONTINUED | OUTPATIENT
Start: 2024-04-12 | End: 2024-04-12 | Stop reason: SURG

## 2024-04-12 RX ADMIN — Medication 10 ML: at 00:40

## 2024-04-12 RX ADMIN — PANTOPRAZOLE SODIUM 40 MG: 40 INJECTION, POWDER, LYOPHILIZED, FOR SOLUTION INTRAVENOUS at 09:48

## 2024-04-12 RX ADMIN — Medication 10 ML: at 09:55

## 2024-04-12 RX ADMIN — BRIVARACETAM 100 MG: 25 TABLET, FILM COATED ORAL at 09:44

## 2024-04-12 RX ADMIN — TIZANIDINE 4 MG: 4 TABLET ORAL at 10:43

## 2024-04-12 RX ADMIN — SODIUM CHLORIDE, POTASSIUM CHLORIDE, SODIUM LACTATE AND CALCIUM CHLORIDE 30 ML/HR: 600; 310; 30; 20 INJECTION, SOLUTION INTRAVENOUS at 07:37

## 2024-04-12 RX ADMIN — BUSPIRONE HYDROCHLORIDE 10 MG: 10 TABLET ORAL at 09:47

## 2024-04-12 RX ADMIN — LIDOCAINE HYDROCHLORIDE 60 MG: 20 INJECTION, SOLUTION INFILTRATION; PERINEURAL at 08:18

## 2024-04-12 RX ADMIN — CARVEDILOL 3.12 MG: 3.12 TABLET, FILM COATED ORAL at 09:46

## 2024-04-12 RX ADMIN — SUCRALFATE 1 G: 1 TABLET ORAL at 12:30

## 2024-04-12 RX ADMIN — HYDROCODONE BITARTRATE AND ACETAMINOPHEN 1 TABLET: 5; 325 TABLET ORAL at 09:46

## 2024-04-12 RX ADMIN — PROCHLORPERAZINE EDISYLATE 5 MG: 5 INJECTION INTRAMUSCULAR; INTRAVENOUS at 07:03

## 2024-04-12 RX ADMIN — PROPOFOL 300 MCG/KG/MIN: 10 INJECTION, EMULSION INTRAVENOUS at 08:19

## 2024-04-12 RX ADMIN — PROPOFOL 120 MG: 10 INJECTION, EMULSION INTRAVENOUS at 08:18

## 2024-04-12 NOTE — NURSING NOTE
Patient left belongings including a blanket and a pillow. This RN called the patients phone number and patients daughter answered the phone. Patients daughter said patient did not want to come back to get the belongings and that it was okay to throw away the patients belongings.

## 2024-04-12 NOTE — ANESTHESIA PREPROCEDURE EVALUATION
Anesthesia Evaluation     Patient summary reviewed and Nursing notes reviewed   no history of anesthetic complications:   NPO Solid Status: > 8 hours  NPO Liquid Status: > 8 hours           Airway   Mallampati: II  TM distance: >3 FB  Neck ROM: full  No difficulty expected  Dental - normal exam     Pulmonary    (+) pulmonary embolism,  (-) asthma, sleep apnea, rhonchi, decreased breath sounds, wheezes, not a smoker  Cardiovascular   Exercise tolerance: good (4-7 METS)    Rhythm: regular  Rate: normal    (-) hypertension, CAD, dysrhythmias, angina, LI, murmur      Neuro/Psych  (+) seizures, headaches, psychiatric history Anxiety  (-) CVA  GI/Hepatic/Renal/Endo    (+) obesity, PUD, GI bleeding lower   (-) liver disease, no renal disease, diabetes, no thyroid disorder    Musculoskeletal     Abdominal     Abdomen: soft.   Substance History      OB/GYN          Other                    Anesthesia Plan    ASA 3     MAC   total IV anesthesia  intravenous induction     Anesthetic plan, risks, benefits, and alternatives have been provided, discussed and informed consent has been obtained with: patient.    CODE STATUS:    Code Status (Patient has no pulse and is not breathing): CPR (Attempt to Resuscitate)  Medical Interventions (Patient has pulse or is breathing): Full Support

## 2024-04-12 NOTE — DISCHARGE SUMMARY
Patient Name: Liya Carlson  : 1985  MRN: [id number]    Date of Admission: 2024  Date of Discharge:  2024  Primary Care Physician: Servando Gao MD      Chief Complaint:   Chest Pain, Dizziness, and Black or Bloody Stool      Discharge Diagnoses     Active Hospital Problems    Diagnosis  POA    **Chest pain [R07.9]  Yes    Anxiety disorder [F41.9]  Yes    History of pulmonary embolism [Z86.711]  Yes    Chronic anticoagulation [Z79.01]  Not Applicable    Rectal bleeding [K62.5]  Yes    Factor V Leiden [D68.51]  Yes    Dyslipidemia [E78.5]  Yes    Seizures [R56.9]  Yes    Abdominal pain [R10.9]  Yes      Resolved Hospital Problems    Diagnosis Date Resolved POA    Dysphagia [R13.10] 2024 Yes        Hospital Course     Ms. Carlson is a 38 y.o. female with a history of PE on chronic anticoagulation, factor V Leiden, HLD, seizures, migraine that presents with abdominal pain, typical chest pain and dysphagia.  Cardiology was consulted but felt low suspicion for cardiac etiology of pain.  CTA chest was negative for acute PE or other acute process.  Her enzymes were negative as well as no acute ischemic change on EKG therefore no indication for ischemic cardiac workup.  Gastroenterology consulted and Eliquis was held for endoscopy.  EGD and colonoscopy performed today demonstrated possible Candida as well as gastritis.  Biopsies and testing for Candida and H. pylori are pending.  Colonoscopy demonstrated small hemorrhoids and a small likely benign rectal polyp.  Gastroenterology has initiated PPI and Carafate.  If positive for H. pylori or Candida she will be prescribed treatment by gastroenterology.  If pain persist, GI recommends pursuing outpatient ultrasound.  Can resume Eliquis tomorrow given biopsies today.  Otherwise patient's labs and vital signs remained stable.  She has been cleared for discharge to home with follow-up in the outpatient setting.        Day of Discharge      Subjective:  Still with pain but tolerating diet.  No other new events or complaints    Physical Exam:  Temp:  [97.5 °F (36.4 °C)-98.1 °F (36.7 °C)] 97.7 °F (36.5 °C)  Heart Rate:  [67-76] 70  Resp:  [11-16] 14  BP: (107-143)/(55-88) 143/88  Body mass index is 31.01 kg/m².  Physical Exam  Vitals reviewed.   Constitutional:       General: She is not in acute distress.     Appearance: Normal appearance. She is well-developed. She is not ill-appearing or toxic-appearing.   HENT:      Head: Normocephalic and atraumatic.      Mouth/Throat:      Mouth: Mucous membranes are moist.   Cardiovascular:      Rate and Rhythm: Normal rate and regular rhythm.   Pulmonary:      Effort: Pulmonary effort is normal. No respiratory distress.      Breath sounds: Normal breath sounds.   Abdominal:      General: Bowel sounds are normal. There is no distension.      Palpations: Abdomen is soft.      Tenderness: There is no abdominal tenderness.   Skin:     General: Skin is warm and dry.   Neurological:      General: No focal deficit present.      Mental Status: She is alert and oriented to person, place, and time.   Psychiatric:         Mood and Affect: Mood normal.         Behavior: Behavior normal.         Thought Content: Thought content normal.         Consultants     Consult Orders (all) (From admission, onward)       Start     Ordered    04/10/24 0220  Inpatient Cardiology Consult  Once        Specialty:  Cardiology  Provider:  Livia Whipple MD    04/10/24 0220    04/10/24 0220  Inpatient Gastroenterology Consult  Once        Specialty:  Gastroenterology  Provider:  Leonor Dong MD    04/10/24 0220    04/10/24 0132  A (on-call MD unless specified) Details  Once        Specialty:  Hospitalist  Provider:  (Not yet assigned)    04/10/24 0131                  Procedures     COLONOSCOPY to cecum with cold polypectomy, ESOPHAGOGASTRODUODENOSCOPY with  biopsies    Imaging Results (All)       Procedure Component Value Units  Date/Time    CT Angiogram Chest [[id number]] Collected: 04/10/24 0105     Updated: 04/10/24 0105    Narrative:        Patient: TOSHIA AYERS  Time Out: 01:04  Exam(s): CTA CHEST     EXAM:    CT Angiography Chest With Intravenous Contrast    CLINICAL HISTORY:     Reason for exam: Chest pain and shortness of breath. Patient also does   report dysphagia..    TECHNIQUE:    Axial computed tomographic angiography images of the chest with   intravenous contrast.  CTDI is 3.57 mGy and DLP is 117.1 mGy-cm.  This CT   exam was performed according to the principle of ALARA (As Low As   Reasonably Achievable) by using one or more of the following dose   reduction techniques: automated exposure control, adjustment of the mA   and or kV according to patient size, and or use of iterative   reconstruction technique.    MIP reconstructed images were created and reviewed.    COMPARISON:    No relevant prior studies available.    FINDINGS:    Pulmonary arteries:  Unremarkable.  No pulmonary embolism.    Aorta:  No acute findings.  No thoracic aortic aneurysm.    Lungs:  Unremarkable.  No mass.  No consolidation.    Pleural space:  Unremarkable.  No significant effusion.  No   pneumothorax.    Heart:  Unremarkable.  No cardiomegaly.  No significant pericardial   effusion.  No evidence of RV dysfunction.    Bones joints:  No acute fracture.  No dislocation.    Soft tissues:  Unremarkable.    Lymph nodes:  Unremarkable.  No enlarged lymph nodes.    Liver:  Hepatic steatosis.    IMPRESSION:         No acute findings in the visualized arteries of the chest.      Impression:          Electronically signed by Solo Pierre MD on 04-10-24 at 0104    CT Abdomen Pelvis With Contrast [[id number]] Collected: 04/10/24 0103     Updated: 04/10/24 0103    Narrative:        Patient: TOSHIA AYERS  Time Out: 01:03  Exam(s): CT ABDOMEN + PELVIS With Contrast     EXAM:    CT Abdomen and Pelvis With Intravenous Contrast    CLINICAL HISTORY:     Reason for  exam: Generalized abdominal pain with rectal bleeding.   Patient is on Eliquis.    TECHNIQUE:    Axial computed tomography images of the abdomen and pelvis with   intravenous contrast.  CTDI is 13.44 mGy and DLP is 669.9 mGy-cm.  This   CT exam was performed according to the principle of ALARA (As Low As   Reasonably Achievable) by using one or more of the following dose   reduction techniques: automated exposure control, adjustment of the mA   and or kV according to patient size, and or use of iterative   reconstruction technique.    COMPARISON:    No relevant prior studies available.    FINDINGS:    Lung bases:  Unremarkable.  No mass.  No consolidation.     ABDOMEN:    Liver:  Hepatic steatosis.    Gallbladder and bile ducts:  Contracted gallbladder.  No calcified   stones.  No ductal dilation.    Pancreas:  Unremarkable.  No mass.  No ductal dilation.    Spleen:  Unremarkable.  No splenomegaly.    Adrenals:  Unremarkable.  No mass.    Kidneys and ureters:  Unremarkable.  No hydronephrosis or delayed   nephrogram.    Stomach and bowel:  Unremarkable.  No obstruction.  No mucosal   thickening.     PELVIS:    Appendix:  Normal appendix.    Bladder:  Unremarkable.  No mass.    Reproductive:  Unremarkable as visualized.     ABDOMEN and PELVIS:    Intraperitoneal space:  Unremarkable.  No free air.  No significant   fluid collection.    Bones joints:  No acute fracture.  No dislocation.    Soft tissues:  Unremarkable.    Vasculature:  Unremarkable.  No abdominal aortic aneurysm.    Lymph nodes:  Unremarkable.  No enlarged lymph nodes.    IMPRESSION:         No acute findings in the abdomen or pelvis.      Impression:          Electronically signed by Solo Pierre MD on 04-10-24 at 0103    XR Chest 1 View [[id number]] Collected: 04/10/24 0044     Updated: 04/10/24 0044    Narrative:        Patient: TOSHIA AYERS  Time Out: 00:43  Exam(s): XR CXR 1 VIEW     EXAM:    XR Chest, 1 View    CLINICAL HISTORY:     Reason for  exam: Chest Pain Triage Protocol.    TECHNIQUE:    Frontal view of the chest.    COMPARISON:    No relevant prior studies available.    FINDINGS:    Lungs:  Unremarkable.  No consolidation.    Pleural space:  Unremarkable.  No pneumothorax.    Heart:  Unremarkable.  No cardiomegaly.    Mediastinum:  Unremarkable.  Normal mediastinal contour.    Bones joints:  Unremarkable.  No acute fracture.    IMPRESSION:         Normal chest x-ray.      Impression:          Electronically signed by Solo Pierre MD on 04-10-24 at 0043              Pertinent Labs     Results from last 7 days   Lab Units 04/12/24  0430 04/11/24  2345 04/11/24  1642 04/11/24  1301 04/11/24  0501 04/10/24  1545 04/10/24  0422 04/09/24  2314   WBC 10*3/mm3 5.30  --   --   --  4.55  --  6.69 7.18   HEMOGLOBIN g/dL 11.2* 11.5* 10.9* 11.2* 11.8*   < > 12.2 11.9*   PLATELETS 10*3/mm3 328  --   --   --  360  --  356 365    < > = values in this interval not displayed.     Results from last 7 days   Lab Units 04/12/24  0430 04/11/24  0501 04/10/24  0422 04/09/24  2314   SODIUM mmol/L 138 137 136 137   POTASSIUM mmol/L 4.0 3.9 3.7 3.9   CHLORIDE mmol/L 107 104 101 102   CO2 mmol/L 20.7* 23.9 25.0 25.0   BUN mg/dL 6 8 8 9   CREATININE mg/dL 0.75 0.81 0.76 0.93   GLUCOSE mg/dL 115* 128* 104* 108*   EGFR mL/min/1.73 103.4 94.2 101.7 79.8     Results from last 7 days   Lab Units 04/09/24  2314   ALBUMIN g/dL 3.9   BILIRUBIN mg/dL 0.2   ALK PHOS U/L 69   AST (SGOT) U/L 19   ALT (SGPT) U/L 32     Results from last 7 days   Lab Units 04/12/24  0430 04/11/24  0501 04/10/24  0422 04/09/24  2314   CALCIUM mg/dL 8.8 8.8 9.1 9.1   ALBUMIN g/dL  --   --   --  3.9     Results from last 7 days   Lab Units 04/09/24  2314   LIPASE U/L 16     Results from last 7 days   Lab Units 04/10/24  0422 04/10/24  0115 04/10/24  0114 04/09/24 2314   HSTROP T ng/L <6 <6  --  <6   PROBNP pg/mL  --   --   --  49.6   D DIMER QUANT MCGFEU/mL  --   --  <0.27  --            Invalid input(s):  "\"LDLCALC\"          Test Results Pending at Discharge     Pending Labs       Order Current Status    Tissue Pathology Exam In process            Discharge Details        Discharge Medications        New Medications        Instructions Start Date   sucralfate 1 g tablet  Commonly known as: CARAFATE   1 g, Oral, 4 Times Daily Before Meals & Nightly             Changes to Medications        Instructions Start Date   pantoprazole 40 MG EC tablet  Commonly known as: PROTONIX  What changed: when to take this   40 mg, Oral, 2 Times Daily Before Meals             Continue These Medications        Instructions Start Date   amitriptyline 75 MG tablet  Commonly known as: ELAVIL   1 tablet, Oral, Nightly      apixaban 5 MG tablet tablet  Commonly known as: ELIQUIS   5 mg, Oral, 2 Times Daily   Start Date: April 13, 2024     Briviact 100 MG tablet  Generic drug: brivaracetam   1 tablet, Oral, 2 Times Daily      busPIRone 10 MG tablet  Commonly known as: BUSPAR   10 mg, Oral, 2 Times Daily      carvedilol 3.125 MG tablet  Commonly known as: Coreg   3.125 mg, Oral, 2 Times Daily With Meals      fluticasone 50 MCG/ACT nasal spray  Commonly known as: Flonase   2 sprays, Nasal, Daily, Administer 2 sprays in each nostril for each dose.      hydrOXYzine 25 MG tablet  Commonly known as: ATARAX   25 mg, Oral, Every 8 Hours PRN, Do not drive      naratriptan 2.5 MG tablet  Commonly known as: AMERGE   2.5 mg, Oral, Once As Needed, 2.5 mg at onset of headache, may repeat in 2 hours if needed Max of 2 tabs in 24 hrs.       ondansetron 4 MG tablet  Commonly known as: ZOFRAN   4 mg, Oral, Every 8 Hours PRN      Qulipta 60 MG tablet  Generic drug: Atogepant   60 mg, Oral, Daily      tiZANidine 4 MG tablet  Commonly known as: ZANAFLEX   4 mg, Oral, Every 8 Hours PRN      zolpidem 10 MG tablet  Commonly known as: AMBIEN   1 p.o. at bedtime as needed sleep             Stop These Medications      Ajovy 225 MG/1.5ML solution auto-injector  Generic " drug: Fremanezumab-vfrm     cefuroxime 500 MG tablet  Commonly known as: CEFTIN     Linzess 72 MCG capsule capsule  Generic drug: linaclotide     ondansetron ODT 8 MG disintegrating tablet  Commonly known as: ZOFRAN-ODT     promethazine 25 MG tablet  Commonly known as: PHENERGAN     rizatriptan 5 MG tablet  Commonly known as: MAXALT     topiramate 50 MG tablet  Commonly known as: TOPAMAX              No Known Allergies    Discharge Disposition:  Home or Self Care      Discharge Diet:  Diet Order   Procedures    Diet: Regular/House, Gastrointestinal; Low Irritant; Fluid Consistency: Thin (IDDSI 0)       Discharge Activity:       CODE STATUS:    Code Status and Medical Interventions:   Ordered at: 04/10/24 0221     Code Status (Patient has no pulse and is not breathing):    CPR (Attempt to Resuscitate)     Medical Interventions (Patient has pulse or is breathing):    Full Support       No future appointments.   Follow-up Information       Servando Gao MD .    Specialty: Internal Medicine  Contact information:  [address] [place] [address]  [phone]                             Time Spent on Discharge:  Greater than 50 minutes      RAFIQ Bass  [facility]  04/12/24  12:29 EDT

## 2024-04-12 NOTE — PLAN OF CARE
Problem: Adult Inpatient Plan of Care  Goal: Plan of Care Review  4/12/2024 0601 by Patricia Lorenzo, RN  Outcome: Ongoing, Progressing  Flowsheets  Taken 4/12/2024 0601  Progress: no change  Plan of Care Reviewed With: patient  Taken 4/12/2024 0556  Outcome Evaluation: No change noted. Pt oriented x4, up at darci, RA, PRN medication for pain and nausea given, VSS. Pt scheduled to have EGD and Colonoscopy today.

## 2024-04-12 NOTE — PROGRESS NOTES
EGD and colonoscopy performed 4/12/2024  EGD for noncardiac chest pain revealed possible Candida as well as gastritis checking for Candida and H. Pylori    Colonoscopy small hemorrhoids and a small rectal polyp      Plan  1.  Continue PPI  2.  Continue Carafate  3.  Treat H. Pylori for Candida if positive-we will follow-up on these results when they return.  If patient is still in the hospital we will make recommendations  4.  Consider outpatient ultrasound if symptoms persist despite treatment  5.  Advance diet and okay to discharge home from a GI standpoint  6.  GI will sign off please call us if there are any questions or concerns  7.  Please have patient's schedule a follow-up with GI after discharge from the hospital

## 2024-04-12 NOTE — ANESTHESIA POSTPROCEDURE EVALUATION
"Patient: Liya Carlson    Procedure Summary       Date: 04/12/24 Room / Location:  [facility] /  [facility]    Anesthesia Start: 0806 Anesthesia Stop: 0843    Procedures:       COLONOSCOPY to cecum with cold polypectomy      ESOPHAGOGASTRODUODENOSCOPY with  biopsies (Esophagus) Diagnosis:       Chest pain, unspecified type      Generalized abdominal pain      Esophageal dysphagia      Rectal bleeding      (Chest pain, unspecified type [R07.9])      (Generalized abdominal pain [R10.84])      (Esophageal dysphagia [R13.19])      (Rectal bleeding [K62.5])    Surgeons: Sha Castelan MD Provider: Pepe Philippe MD    Anesthesia Type: MAC ASA Status: 3            Anesthesia Type: MAC    Vitals  Vitals Value Taken Time   /75 04/12/24 0905   Temp     Pulse 67 04/12/24 0905   Resp 12 04/12/24 0905   SpO2 100 % 04/12/24 0905           Post Anesthesia Care and Evaluation    Level of consciousness: awake and alert  Pain management: adequate    Airway patency: patent  Anesthetic complications: No anesthetic complications  PONV Status: none  Cardiovascular status: acceptable  Respiratory status: acceptable  Hydration status: acceptable    Comments: /75 (BP Location: Left arm, Patient Position: Lying)   Pulse 67   Temp 36.7 °C (98.1 °F) (Oral)   Resp 12   Ht 170.2 cm (67\")   Wt 89.8 kg (198 lb)   LMP  (LMP Unknown)   SpO2 100%   BMI 31.01 kg/m²       "

## 2024-04-12 NOTE — PLAN OF CARE
Goal Outcome Evaluation:      Patient alert and oriented on room air. Patient is supposed to be discharged home today.

## 2024-04-12 NOTE — CASE MANAGEMENT/SOCIAL WORK
Case Management Discharge Note      Final Note: Home no additional dc orders needed. Pallavi padilla/ccp    Provided Post Acute Provider List?: N/A  N/A Provider List Comment: denies dc needs at this time  Provided Post Acute Provider Quality & Resource List?: N/A    Selected Continued Care - Admitted Since 4/9/2024       Destination    No services have been selected for the patient.                Durable Medical Equipment    No services have been selected for the patient.                Dialysis/Infusion    No services have been selected for the patient.                Home Medical Care    No services have been selected for the patient.                Therapy    No services have been selected for the patient.                Community Resources    No services have been selected for the patient.                Community & DME    No services have been selected for the patient.                    Transportation Services  Private: Car    Final Discharge Disposition Code: 01 - home or self-care

## 2024-04-13 ENCOUNTER — READMISSION MANAGEMENT (OUTPATIENT)
Dept: CALL CENTER | Facility: HOSPITAL | Age: 39
End: 2024-04-13
Payer: COMMERCIAL

## 2024-04-13 NOTE — OUTREACH NOTE
Prep Survey      Flowsheet Row Responses   [facility] facility patient discharged from? [place]   Is LACE score < 7 ? No   Eligibility Readm Mgmt   Discharge diagnosis Chest pain   Does the patient have one of the following disease processes/diagnoses(primary or secondary)? Other   Does the patient have Home health ordered? No   Is there a DME ordered? No   Medication alerts for this patient see AVS   Prep survey completed? Yes            Susie RONQUILLO - Registered Nurse

## 2024-04-16 LAB
LAB AP CASE REPORT: NORMAL
PATH REPORT.ADDENDUM SPEC: NORMAL
PATH REPORT.FINAL DX SPEC: NORMAL
PATH REPORT.GROSS SPEC: NORMAL

## 2024-04-17 ENCOUNTER — READMISSION MANAGEMENT (OUTPATIENT)
Dept: CALL CENTER | Facility: HOSPITAL | Age: 39
End: 2024-04-17
Payer: COMMERCIAL

## 2024-04-17 NOTE — OUTREACH NOTE
Medical Week 1 Survey      Flowsheet Row Responses   [facility] patient discharged from? [place]   Does the patient have one of the following disease processes/diagnoses(primary or secondary)? Other   Week 1 attempt successful? Yes   Call start time 1342   Call end time 1345   Discharge diagnosis Chest pain   Meds reviewed with patient/caregiver? Yes   Is the patient having any side effects they believe may be caused by any medication additions or changes? No   Does the patient have all medications ordered at discharge? Yes   Is the patient taking all medications as directed (includes completed medication regime)? Yes   Does the patient have a primary care provider?  Yes   Does the patient have an appointment with their PCP within 7 days of discharge? Greater than 7 days   What is preventing the patient from scheduling follow up appointments within 7 days of discharge? Earlier appointment not available   Has the patient kept scheduled appointments due by today? N/A   Did the patient receive a copy of their discharge instructions? Yes   Nursing interventions Reviewed instructions with patient   What is the patient's perception of their health status since discharge? Improving   If the patient is a current smoker, are they able to teach back resources for cessation? Not a smoker   Week 1 call completed? Yes   Graduated Yes   Did the patient feel the follow up calls were helpful during their recovery period? No   Was the number of calls appropriate? No   Wrap up additional comments pt is a L&D RN at [employer], stated she was already back to work and doing well. no questions.   Call end time 1345            BRENDEN MCLEAN - Registered Nurse

## 2024-09-03 ENCOUNTER — APPOINTMENT (OUTPATIENT)
Dept: GENERAL RADIOLOGY | Facility: HOSPITAL | Age: 39
End: 2024-09-03
Payer: COMMERCIAL

## 2024-09-03 ENCOUNTER — HOSPITAL ENCOUNTER (OUTPATIENT)
Facility: HOSPITAL | Age: 39
Setting detail: OBSERVATION
Discharge: HOME | End: 2024-09-04
Attending: EMERGENCY MEDICINE | Admitting: EMERGENCY MEDICINE
Payer: COMMERCIAL

## 2024-09-03 ENCOUNTER — APPOINTMENT (OUTPATIENT)
Dept: CT IMAGING | Facility: HOSPITAL | Age: 39
End: 2024-09-03
Payer: COMMERCIAL

## 2024-09-03 DIAGNOSIS — Z79.01 CHRONIC ANTICOAGULATION: ICD-10-CM

## 2024-09-03 DIAGNOSIS — S09.90XA CLOSED HEAD INJURY, INITIAL ENCOUNTER: ICD-10-CM

## 2024-09-03 DIAGNOSIS — R56.9 SEIZURE: Primary | ICD-10-CM

## 2024-09-03 LAB
ABO GROUP BLD: NORMAL
ALBUMIN SERPL-MCNC: 4.7 G/DL (ref 3.5–5.2)
ALBUMIN/GLOB SERPL: 1.4 G/DL
ALP SERPL-CCNC: 101 U/L (ref 39–117)
ALT SERPL W P-5'-P-CCNC: 25 U/L (ref 1–33)
ANION GAP SERPL CALCULATED.3IONS-SCNC: 12 MMOL/L (ref 5–15)
AST SERPL-CCNC: 15 U/L (ref 1–32)
BACTERIA UR QL AUTO: NORMAL /HPF
BASOPHILS # BLD AUTO: 0.02 10*3/MM3 (ref 0–0.2)
BASOPHILS NFR BLD AUTO: 0.2 % (ref 0–1.5)
BILIRUB SERPL-MCNC: 0.3 MG/DL (ref 0–1.2)
BILIRUB UR QL STRIP: NEGATIVE
BLD GP AB SCN SERPL QL: NEGATIVE
BUN SERPL-MCNC: 7 MG/DL (ref 6–20)
BUN/CREAT SERPL: 8.5 (ref 7–25)
CALCIUM SPEC-SCNC: 10.1 MG/DL (ref 8.6–10.5)
CHLORIDE SERPL-SCNC: 104 MMOL/L (ref 98–107)
CLARITY UR: CLEAR
CO2 SERPL-SCNC: 25 MMOL/L (ref 22–29)
COLOR UR: ABNORMAL
CREAT SERPL-MCNC: 0.82 MG/DL (ref 0.57–1)
DEPRECATED RDW RBC AUTO: 41.1 FL (ref 37–54)
EGFRCR SERPLBLD CKD-EPI 2021: 92.3 ML/MIN/1.73
EOSINOPHIL # BLD AUTO: 0.03 10*3/MM3 (ref 0–0.4)
EOSINOPHIL NFR BLD AUTO: 0.3 % (ref 0.3–6.2)
ERYTHROCYTE [DISTWIDTH] IN BLOOD BY AUTOMATED COUNT: 13.6 % (ref 12.3–15.4)
GLOBULIN UR ELPH-MCNC: 3.3 GM/DL
GLUCOSE SERPL-MCNC: 108 MG/DL (ref 65–99)
GLUCOSE UR STRIP-MCNC: NEGATIVE MG/DL
HBA1C MFR BLD: 5.5 % (ref 4.8–5.6)
HCG SERPL QL: NEGATIVE
HCT VFR BLD AUTO: 42.2 % (ref 34–46.6)
HGB BLD-MCNC: 14.4 G/DL (ref 12–15.9)
HGB UR QL STRIP.AUTO: NEGATIVE
HYALINE CASTS UR QL AUTO: NORMAL /LPF
IMM GRANULOCYTES # BLD AUTO: 0.03 10*3/MM3 (ref 0–0.05)
IMM GRANULOCYTES NFR BLD AUTO: 0.3 % (ref 0–0.5)
KETONES UR QL STRIP: ABNORMAL
LEUKOCYTE ESTERASE UR QL STRIP.AUTO: ABNORMAL
LYMPHOCYTES # BLD AUTO: 1.96 10*3/MM3 (ref 0.7–3.1)
LYMPHOCYTES NFR BLD AUTO: 20 % (ref 19.6–45.3)
MCH RBC QN AUTO: 28.8 PG (ref 26.6–33)
MCHC RBC AUTO-ENTMCNC: 34.1 G/DL (ref 31.5–35.7)
MCV RBC AUTO: 84.4 FL (ref 79–97)
MONOCYTES # BLD AUTO: 0.32 10*3/MM3 (ref 0.1–0.9)
MONOCYTES NFR BLD AUTO: 3.3 % (ref 5–12)
NEUTROPHILS NFR BLD AUTO: 7.42 10*3/MM3 (ref 1.7–7)
NEUTROPHILS NFR BLD AUTO: 75.9 % (ref 42.7–76)
NITRITE UR QL STRIP: NEGATIVE
NRBC BLD AUTO-RTO: 0 /100 WBC (ref 0–0.2)
PH UR STRIP.AUTO: 7 [PH] (ref 5–8)
PLATELET # BLD AUTO: 396 10*3/MM3 (ref 140–450)
PMV BLD AUTO: 9.8 FL (ref 6–12)
POTASSIUM SERPL-SCNC: 3.2 MMOL/L (ref 3.5–5.2)
PROT SERPL-MCNC: 8 G/DL (ref 6–8.5)
PROT UR QL STRIP: ABNORMAL
RBC # BLD AUTO: 5 10*6/MM3 (ref 3.77–5.28)
RBC # UR STRIP: NORMAL /HPF
REF LAB TEST METHOD: NORMAL
RH BLD: POSITIVE
SODIUM SERPL-SCNC: 141 MMOL/L (ref 136–145)
SP GR UR STRIP: 1.03 (ref 1–1.03)
SQUAMOUS #/AREA URNS HPF: NORMAL /HPF
T&S EXPIRATION DATE: NORMAL
UROBILINOGEN UR QL STRIP: ABNORMAL
WBC # UR STRIP: NORMAL /HPF
WBC NRBC COR # BLD AUTO: 9.78 10*3/MM3 (ref 3.4–10.8)

## 2024-09-03 PROCEDURE — 84703 CHORIONIC GONADOTROPIN ASSAY: CPT | Performed by: EMERGENCY MEDICINE

## 2024-09-03 PROCEDURE — P9612 CATHETERIZE FOR URINE SPEC: HCPCS

## 2024-09-03 PROCEDURE — 86900 BLOOD TYPING SEROLOGIC ABO: CPT | Performed by: EMERGENCY MEDICINE

## 2024-09-03 PROCEDURE — 86850 RBC ANTIBODY SCREEN: CPT | Performed by: EMERGENCY MEDICINE

## 2024-09-03 PROCEDURE — 99285 EMERGENCY DEPT VISIT HI MDM: CPT

## 2024-09-03 PROCEDURE — 86901 BLOOD TYPING SEROLOGIC RH(D): CPT | Performed by: EMERGENCY MEDICINE

## 2024-09-03 PROCEDURE — 81001 URINALYSIS AUTO W/SCOPE: CPT | Performed by: EMERGENCY MEDICINE

## 2024-09-03 PROCEDURE — 85025 COMPLETE CBC W/AUTO DIFF WBC: CPT | Performed by: EMERGENCY MEDICINE

## 2024-09-03 PROCEDURE — G0378 HOSPITAL OBSERVATION PER HR: HCPCS

## 2024-09-03 PROCEDURE — 93005 ELECTROCARDIOGRAM TRACING: CPT | Performed by: EMERGENCY MEDICINE

## 2024-09-03 PROCEDURE — 71045 X-RAY EXAM CHEST 1 VIEW: CPT

## 2024-09-03 PROCEDURE — 72125 CT NECK SPINE W/O DYE: CPT

## 2024-09-03 PROCEDURE — 93010 ELECTROCARDIOGRAM REPORT: CPT | Performed by: STUDENT IN AN ORGANIZED HEALTH CARE EDUCATION/TRAINING PROGRAM

## 2024-09-03 PROCEDURE — 83036 HEMOGLOBIN GLYCOSYLATED A1C: CPT

## 2024-09-03 PROCEDURE — 70450 CT HEAD/BRAIN W/O DYE: CPT

## 2024-09-03 PROCEDURE — 80053 COMPREHEN METABOLIC PANEL: CPT | Performed by: EMERGENCY MEDICINE

## 2024-09-03 RX ORDER — ONDANSETRON 2 MG/ML
4 INJECTION INTRAMUSCULAR; INTRAVENOUS EVERY 6 HOURS PRN
Status: DISCONTINUED | OUTPATIENT
Start: 2024-09-03 | End: 2024-09-04 | Stop reason: HOSPADM

## 2024-09-03 RX ORDER — SODIUM CHLORIDE 9 MG/ML
40 INJECTION, SOLUTION INTRAVENOUS AS NEEDED
Status: DISCONTINUED | OUTPATIENT
Start: 2024-09-03 | End: 2024-09-04 | Stop reason: HOSPADM

## 2024-09-03 RX ORDER — IBUPROFEN 600 MG/1
1 TABLET ORAL
Status: DISCONTINUED | OUTPATIENT
Start: 2024-09-03 | End: 2024-09-04 | Stop reason: HOSPADM

## 2024-09-03 RX ORDER — BISACODYL 10 MG
10 SUPPOSITORY, RECTAL RECTAL DAILY PRN
Status: DISCONTINUED | OUTPATIENT
Start: 2024-09-03 | End: 2024-09-04 | Stop reason: HOSPADM

## 2024-09-03 RX ORDER — NICOTINE POLACRILEX 4 MG
15 LOZENGE BUCCAL
Status: DISCONTINUED | OUTPATIENT
Start: 2024-09-03 | End: 2024-09-04 | Stop reason: HOSPADM

## 2024-09-03 RX ORDER — POLYETHYLENE GLYCOL 3350 17 G/17G
17 POWDER, FOR SOLUTION ORAL DAILY PRN
Status: DISCONTINUED | OUTPATIENT
Start: 2024-09-03 | End: 2024-09-04 | Stop reason: HOSPADM

## 2024-09-03 RX ORDER — ACETAMINOPHEN 650 MG/1
650 SUPPOSITORY RECTAL EVERY 4 HOURS PRN
Status: DISCONTINUED | OUTPATIENT
Start: 2024-09-03 | End: 2024-09-04 | Stop reason: HOSPADM

## 2024-09-03 RX ORDER — ACETAMINOPHEN 325 MG/1
650 TABLET ORAL EVERY 4 HOURS PRN
Status: DISCONTINUED | OUTPATIENT
Start: 2024-09-03 | End: 2024-09-04 | Stop reason: HOSPADM

## 2024-09-03 RX ORDER — NITROGLYCERIN 0.4 MG/1
0.4 TABLET SUBLINGUAL
Status: DISCONTINUED | OUTPATIENT
Start: 2024-09-03 | End: 2024-09-04 | Stop reason: HOSPADM

## 2024-09-03 RX ORDER — ONDANSETRON 4 MG/1
4 TABLET, ORALLY DISINTEGRATING ORAL EVERY 6 HOURS PRN
Status: DISCONTINUED | OUTPATIENT
Start: 2024-09-03 | End: 2024-09-04 | Stop reason: HOSPADM

## 2024-09-03 RX ORDER — ACETAMINOPHEN 160 MG/5ML
650 SOLUTION ORAL EVERY 4 HOURS PRN
Status: DISCONTINUED | OUTPATIENT
Start: 2024-09-03 | End: 2024-09-04 | Stop reason: HOSPADM

## 2024-09-03 RX ORDER — BISACODYL 5 MG/1
5 TABLET, DELAYED RELEASE ORAL DAILY PRN
Status: DISCONTINUED | OUTPATIENT
Start: 2024-09-03 | End: 2024-09-04 | Stop reason: HOSPADM

## 2024-09-03 RX ORDER — AMOXICILLIN 250 MG
2 CAPSULE ORAL 2 TIMES DAILY PRN
Status: DISCONTINUED | OUTPATIENT
Start: 2024-09-03 | End: 2024-09-04 | Stop reason: HOSPADM

## 2024-09-03 RX ORDER — INSULIN LISPRO 100 [IU]/ML
2-7 INJECTION, SOLUTION INTRAVENOUS; SUBCUTANEOUS
Status: DISCONTINUED | OUTPATIENT
Start: 2024-09-04 | End: 2024-09-04 | Stop reason: HOSPADM

## 2024-09-03 RX ORDER — SODIUM CHLORIDE 0.9 % (FLUSH) 0.9 %
10 SYRINGE (ML) INJECTION AS NEEDED
Status: DISCONTINUED | OUTPATIENT
Start: 2024-09-03 | End: 2024-09-04 | Stop reason: HOSPADM

## 2024-09-03 RX ORDER — DEXTROSE MONOHYDRATE 25 G/50ML
25 INJECTION, SOLUTION INTRAVENOUS
Status: DISCONTINUED | OUTPATIENT
Start: 2024-09-03 | End: 2024-09-04 | Stop reason: HOSPADM

## 2024-09-03 RX ORDER — SODIUM CHLORIDE 0.9 % (FLUSH) 0.9 %
10 SYRINGE (ML) INJECTION EVERY 12 HOURS SCHEDULED
Status: DISCONTINUED | OUTPATIENT
Start: 2024-09-03 | End: 2024-09-04 | Stop reason: HOSPADM

## 2024-09-04 ENCOUNTER — APPOINTMENT (OUTPATIENT)
Dept: NEUROLOGY | Facility: HOSPITAL | Age: 39
End: 2024-09-04
Payer: COMMERCIAL

## 2024-09-04 VITALS
TEMPERATURE: 98.1 F | OXYGEN SATURATION: 100 % | WEIGHT: 183 LBS | HEART RATE: 87 BPM | SYSTOLIC BLOOD PRESSURE: 123 MMHG | BODY MASS INDEX: 28.72 KG/M2 | DIASTOLIC BLOOD PRESSURE: 82 MMHG | RESPIRATION RATE: 18 BRPM | HEIGHT: 67 IN

## 2024-09-04 LAB
ANION GAP SERPL CALCULATED.3IONS-SCNC: 12.9 MMOL/L (ref 5–15)
BUN SERPL-MCNC: 7 MG/DL (ref 6–20)
BUN/CREAT SERPL: 10.3 (ref 7–25)
CALCIUM SPEC-SCNC: 9.2 MG/DL (ref 8.6–10.5)
CHLORIDE SERPL-SCNC: 107 MMOL/L (ref 98–107)
CO2 SERPL-SCNC: 20.1 MMOL/L (ref 22–29)
CREAT SERPL-MCNC: 0.68 MG/DL (ref 0.57–1)
DEPRECATED RDW RBC AUTO: 40.1 FL (ref 37–54)
EGFRCR SERPLBLD CKD-EPI 2021: 112.4 ML/MIN/1.73
ERYTHROCYTE [DISTWIDTH] IN BLOOD BY AUTOMATED COUNT: 13.1 % (ref 12.3–15.4)
GLUCOSE BLDC GLUCOMTR-MCNC: 107 MG/DL (ref 70–130)
GLUCOSE BLDC GLUCOMTR-MCNC: 94 MG/DL (ref 70–130)
GLUCOSE SERPL-MCNC: 92 MG/DL (ref 65–99)
HCT VFR BLD AUTO: 37.7 % (ref 34–46.6)
HGB BLD-MCNC: 12.6 G/DL (ref 12–15.9)
MCH RBC QN AUTO: 28.3 PG (ref 26.6–33)
MCHC RBC AUTO-ENTMCNC: 33.4 G/DL (ref 31.5–35.7)
MCV RBC AUTO: 84.5 FL (ref 79–97)
PLATELET # BLD AUTO: 365 10*3/MM3 (ref 140–450)
PMV BLD AUTO: 10.1 FL (ref 6–12)
POTASSIUM SERPL-SCNC: 3.9 MMOL/L (ref 3.5–5.2)
POTASSIUM SERPL-SCNC: 4.1 MMOL/L (ref 3.5–5.2)
QT INTERVAL: 386 MS
QTC INTERVAL: 508 MS
RBC # BLD AUTO: 4.46 10*6/MM3 (ref 3.77–5.28)
SODIUM SERPL-SCNC: 140 MMOL/L (ref 136–145)
WBC NRBC COR # BLD AUTO: 6.88 10*3/MM3 (ref 3.4–10.8)

## 2024-09-04 PROCEDURE — 82948 REAGENT STRIP/BLOOD GLUCOSE: CPT

## 2024-09-04 PROCEDURE — 96374 THER/PROPH/DIAG INJ IV PUSH: CPT

## 2024-09-04 PROCEDURE — G0378 HOSPITAL OBSERVATION PER HR: HCPCS

## 2024-09-04 PROCEDURE — 80048 BASIC METABOLIC PNL TOTAL CA: CPT

## 2024-09-04 PROCEDURE — 95816 EEG AWAKE AND DROWSY: CPT | Performed by: STUDENT IN AN ORGANIZED HEALTH CARE EDUCATION/TRAINING PROGRAM

## 2024-09-04 PROCEDURE — 25010000002 PROCHLORPERAZINE 10 MG/2ML SOLUTION

## 2024-09-04 PROCEDURE — 85027 COMPLETE CBC AUTOMATED: CPT

## 2024-09-04 PROCEDURE — 25010000002 DIPHENHYDRAMINE PER 50 MG

## 2024-09-04 PROCEDURE — 25010000002 KETOROLAC TROMETHAMINE PER 15 MG: Performed by: NURSE PRACTITIONER

## 2024-09-04 PROCEDURE — 84132 ASSAY OF SERUM POTASSIUM: CPT

## 2024-09-04 PROCEDURE — 96375 TX/PRO/DX INJ NEW DRUG ADDON: CPT

## 2024-09-04 PROCEDURE — 96376 TX/PRO/DX INJ SAME DRUG ADON: CPT

## 2024-09-04 PROCEDURE — 25010000002 MAGNESIUM SULFATE 2 GM/50ML SOLUTION: Performed by: NURSE PRACTITIONER

## 2024-09-04 PROCEDURE — 95816 EEG AWAKE AND DROWSY: CPT

## 2024-09-04 PROCEDURE — 25010000002 ONDANSETRON PER 1 MG

## 2024-09-04 PROCEDURE — 99214 OFFICE O/P EST MOD 30 MIN: CPT | Performed by: NURSE PRACTITIONER

## 2024-09-04 RX ORDER — MAGNESIUM SULFATE HEPTAHYDRATE 40 MG/ML
2 INJECTION, SOLUTION INTRAVENOUS ONCE
Status: COMPLETED | OUTPATIENT
Start: 2024-09-04 | End: 2024-09-04

## 2024-09-04 RX ORDER — PROCHLORPERAZINE EDISYLATE 5 MG/ML
10 INJECTION INTRAMUSCULAR; INTRAVENOUS ONCE
Status: COMPLETED | OUTPATIENT
Start: 2024-09-04 | End: 2024-09-04

## 2024-09-04 RX ORDER — LACOSAMIDE 50 MG/1
TABLET ORAL
Qty: 60 TABLET | Refills: 3 | Status: SHIPPED | OUTPATIENT
Start: 2024-09-04 | End: 2024-10-09

## 2024-09-04 RX ORDER — DIPHENHYDRAMINE HYDROCHLORIDE 50 MG/ML
25 INJECTION INTRAMUSCULAR; INTRAVENOUS ONCE
Status: COMPLETED | OUTPATIENT
Start: 2024-09-04 | End: 2024-09-04

## 2024-09-04 RX ORDER — ZOLPIDEM TARTRATE 5 MG/1
10 TABLET ORAL NIGHTLY PRN
Status: DISCONTINUED | OUTPATIENT
Start: 2024-09-04 | End: 2024-09-04 | Stop reason: HOSPADM

## 2024-09-04 RX ORDER — ROSUVASTATIN CALCIUM 10 MG/1
10 TABLET, COATED ORAL DAILY
COMMUNITY

## 2024-09-04 RX ORDER — CARVEDILOL 3.12 MG/1
3.12 TABLET ORAL 2 TIMES DAILY WITH MEALS
Status: DISCONTINUED | OUTPATIENT
Start: 2024-09-04 | End: 2024-09-04 | Stop reason: HOSPADM

## 2024-09-04 RX ORDER — POTASSIUM CHLORIDE 750 MG/1
40 TABLET, FILM COATED, EXTENDED RELEASE ORAL EVERY 4 HOURS
Status: COMPLETED | OUTPATIENT
Start: 2024-09-04 | End: 2024-09-04

## 2024-09-04 RX ORDER — BUSPIRONE HYDROCHLORIDE 10 MG/1
10 TABLET ORAL 3 TIMES DAILY
Status: DISCONTINUED | OUTPATIENT
Start: 2024-09-04 | End: 2024-09-04 | Stop reason: HOSPADM

## 2024-09-04 RX ORDER — ROSUVASTATIN CALCIUM 20 MG/1
10 TABLET, COATED ORAL DAILY
Status: DISCONTINUED | OUTPATIENT
Start: 2024-09-04 | End: 2024-09-04 | Stop reason: HOSPADM

## 2024-09-04 RX ORDER — ATOGEPANT 60 MG/1
60 TABLET ORAL DAILY
COMMUNITY

## 2024-09-04 RX ORDER — HYDROXYZINE HYDROCHLORIDE 25 MG/1
50 TABLET, FILM COATED ORAL EVERY 8 HOURS PRN
Status: DISCONTINUED | OUTPATIENT
Start: 2024-09-04 | End: 2024-09-04 | Stop reason: HOSPADM

## 2024-09-04 RX ORDER — HYDROCODONE BITARTRATE AND ACETAMINOPHEN 5; 325 MG/1; MG/1
1 TABLET ORAL EVERY 6 HOURS PRN
COMMUNITY

## 2024-09-04 RX ORDER — FAMOTIDINE 20 MG/1
20 TABLET, FILM COATED ORAL 2 TIMES DAILY
COMMUNITY

## 2024-09-04 RX ORDER — KETOROLAC TROMETHAMINE 15 MG/ML
15 INJECTION, SOLUTION INTRAMUSCULAR; INTRAVENOUS ONCE
Status: COMPLETED | OUTPATIENT
Start: 2024-09-04 | End: 2024-09-04

## 2024-09-04 RX ORDER — FAMOTIDINE 20 MG/1
20 TABLET, FILM COATED ORAL 2 TIMES DAILY
Status: DISCONTINUED | OUTPATIENT
Start: 2024-09-04 | End: 2024-09-04 | Stop reason: HOSPADM

## 2024-09-04 RX ADMIN — FAMOTIDINE 20 MG: 20 TABLET, FILM COATED ORAL at 13:38

## 2024-09-04 RX ADMIN — FAMOTIDINE 20 MG: 20 TABLET, FILM COATED ORAL at 00:32

## 2024-09-04 RX ADMIN — PROCHLORPERAZINE EDISYLATE 10 MG: 5 INJECTION INTRAMUSCULAR; INTRAVENOUS at 00:28

## 2024-09-04 RX ADMIN — KETOROLAC TROMETHAMINE 15 MG: 15 INJECTION, SOLUTION INTRAMUSCULAR; INTRAVENOUS at 10:57

## 2024-09-04 RX ADMIN — BRIVARACETAM 100 MG: 25 TABLET, FILM COATED ORAL at 02:34

## 2024-09-04 RX ADMIN — BRIVARACETAM 100 MG: 25 TABLET, FILM COATED ORAL at 13:38

## 2024-09-04 RX ADMIN — POTASSIUM CHLORIDE 40 MEQ: 750 TABLET, EXTENDED RELEASE ORAL at 00:32

## 2024-09-04 RX ADMIN — APIXABAN 5 MG: 5 TABLET, FILM COATED ORAL at 13:38

## 2024-09-04 RX ADMIN — MAGNESIUM SULFATE HEPTAHYDRATE 2 G: 40 INJECTION, SOLUTION INTRAVENOUS at 10:56

## 2024-09-04 RX ADMIN — ROSUVASTATIN CALCIUM 10 MG: 20 TABLET, FILM COATED ORAL at 08:30

## 2024-09-04 RX ADMIN — ACETAMINOPHEN 325MG 650 MG: 325 TABLET ORAL at 00:32

## 2024-09-04 RX ADMIN — BUSPIRONE HYDROCHLORIDE 10 MG: 10 TABLET ORAL at 08:30

## 2024-09-04 RX ADMIN — AMITRIPTYLINE HYDROCHLORIDE 75 MG: 50 TABLET, FILM COATED ORAL at 00:55

## 2024-09-04 RX ADMIN — ONDANSETRON 4 MG: 2 INJECTION, SOLUTION INTRAMUSCULAR; INTRAVENOUS at 09:45

## 2024-09-04 RX ADMIN — DIPHENHYDRAMINE HYDROCHLORIDE 25 MG: 50 INJECTION, SOLUTION INTRAMUSCULAR; INTRAVENOUS at 00:28

## 2024-09-04 RX ADMIN — ZOLPIDEM TARTRATE 10 MG: 5 TABLET ORAL at 00:43

## 2024-09-04 RX ADMIN — POTASSIUM CHLORIDE 40 MEQ: 750 TABLET, EXTENDED RELEASE ORAL at 05:16

## 2024-09-04 RX ADMIN — Medication 10 ML: at 00:06

## 2024-09-04 RX ADMIN — ACETAMINOPHEN 325MG 650 MG: 325 TABLET ORAL at 08:30

## 2024-09-04 RX ADMIN — APIXABAN 5 MG: 5 TABLET, FILM COATED ORAL at 00:32

## 2024-09-04 RX ADMIN — CARVEDILOL 3.12 MG: 3.12 TABLET, FILM COATED ORAL at 08:30

## 2024-09-04 RX ADMIN — Medication 10 ML: at 08:31

## 2024-09-04 NOTE — PLAN OF CARE
Goal Outcome Evaluation:      Pt is a 40 yo female admitted to the obs unit for a seizure. She complains of dizziness, headache, and nausea. NKA. A&O x4. RA. X1 assist. NSR. EEG today. Neuro and nutrition consulted.

## 2024-09-04 NOTE — PLAN OF CARE
Goal Outcome Evaluation:              Outcome Evaluation: Pt. 39 yr old AOX4 and able to verbalized needs. IV removed. Discharged summary provided and education completed. Pt instructed to follow up with her PCP in 1 week. PT aware to follow up with Neurology as needed. Medications delived to bed per [facility]. Pt gathered all her belongings and she did not have any other questions at this time. Pt left observation unit per private vehicle.
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
## 2024-09-04 NOTE — ED PROVIDER NOTES
" EMERGENCY DEPARTMENT ENCOUNTER    Room Number:  20/20  PCP: Servando Gao MD    HPI:  Chief Complaint: \"I think I had a seizure\"  A complete HPI/ROS/PMH/PSH/SH/FH are unobtainable due to: Amnesia to the events  Context: Liya Carlson is a 39 y.o. female who presents to the ED c/o acute concern for seizure.  She states that she drove home around 5 PM from work.  She December getting home.  The next week she members, she woke up on the floor.  She had urinated on herself.  She states that she had a right forehead and complains of pain to her head and neck.  She is on Eliquis due to history of prior PE.  Her  came home at 7:45 PM.  She was awake by the time he got home.  She reports having history of seizure and takes her medication faithfully.  No infectious symptoms on review of systems including no fever, cough, shortness of breath, vomiting, diarrhea, urinary symptoms.        PAST MEDICAL HISTORY  Active Ambulatory Problems     Diagnosis Date Noted    Seizures     Pancreatitis     Gastric ulcer     Dyslipidemia 03/18/2016    Environmental allergies 03/18/2016    Intractable migraine without aura and without status migrainosus 08/08/2017    Single subsegmental pulmonary embolism without acute cor pulmonale 09/08/2022    Abnormal CT scan, sigmoid colon 09/08/2022    Menorrhagia with regular cycle 05/08/2018    Muscle spasms of lower extremity 10/07/2022    COVID-19 virus infection 01/23/2023    Abdominal pain 09/23/2015    Chest pain 04/10/2024    History of pulmonary embolism 04/10/2024    Chronic anticoagulation 04/10/2024    Rectal bleeding 04/10/2024    Factor V Leiden 04/10/2024    Anxiety disorder 04/11/2024     Resolved Ambulatory Problems     Diagnosis Date Noted    Dysphagia 04/10/2024     Past Medical History:   Diagnosis Date    Chest wall contusion     Contusion, abdominal wall     Headache, tension-type     History of blood clots     Migraine     Pulmonary embolism          PAST SURGICAL " HISTORY  Past Surgical History:   Procedure Laterality Date    COLONOSCOPY N/A 4/12/2024    Procedure: COLONOSCOPY to cecum with cold polypectomy;  Surgeon: Sha Castelan MD;  Location:  [facility];  Service: Gastroenterology;  Laterality: N/A;  PRE - abd pain, ractal bleeding  POST - polyp, hemorrhoids    ENDOSCOPY N/A 4/12/2024    Procedure: ESOPHAGOGASTRODUODENOSCOPY with  biopsies;  Surgeon: Sha Castelan MD;  Location: [facility];  Service: Gastroenterology;  Laterality: N/A;  POST - possible candida, gastritis    LAPAROSCOPIC TUBAL LIGATION      SIGMOIDOSCOPY N/A 9/11/2022    Procedure: SIGMOIDOSCOPY FLEXIBLE TO DESCENDING COLON;  Surgeon: Sierra Kerr MD;  Location: [facility] ENDOSCOPY;  Service: Gastroenterology;  Laterality: N/A;  PRE- ABDOMINAL PAIN, ABNORMAL CT  POST- SMALL HEMORRHOIDS         FAMILY HISTORY  Family History   Problem Relation Age of Onset    Diabetes Mother     Hypertension Mother     Migraines Mother     Dementia Mother     Arthritis Mother     Kidney disease Mother     Colon polyps Father     Hypertension Father     Diabetes Father     Seizures Father     Stroke Father     Colon cancer Paternal Aunt     Colon polyps Paternal Aunt     Colon cancer Paternal Grandmother     Colon polyps Paternal Grandmother          SOCIAL HISTORY  Social History     Socioeconomic History    Marital status:    Tobacco Use    Smoking status: Never    Smokeless tobacco: Never   Vaping Use    Vaping status: Never Used   Substance and Sexual Activity    Alcohol use: Not Currently     Comment: OCCASIONAL    Drug use: No    Sexual activity: Defer         ALLERGIES  Patient has no known allergies.        REVIEW OF SYSTEMS  Review of Systems     Included in HPI  All systems reviewed and negative except for those discussed in HPI.       PHYSICAL EXAM  ED Triage Vitals [09/03/24 2030]   Temp Heart Rate Resp BP SpO2   98.5 °F (36.9 °C) 100 16 140/80 100 %      Temp src Heart Rate Source  Patient Position BP Location FiO2 (%)   -- -- -- -- --       Physical Exam      GENERAL: no acute distress  HENT: nares patent  EYES: no scleral icterus  CV: regular rhythm, normal rate  RESPIRATORY: normal effort, clear to auscultation bilaterally  ABDOMEN: soft, nontender  MUSCULOSKELETAL: no deformity  NEURO:   Recent and remote memory functions are normal. The patient is attentive with normal concentration. Language is fluent. Speech is clear. The speech is non-dysarthric. Fund of knowledge is normal.   Symmetric smile with no facial droop.  Eyes close shut strongly bilaterally.  Symmetric eyebrow raise bilaterally.  EOMI, PERRL  CN II-XII grossly normal otherwise.  5/5 strength to extremities.  No pronator drift.  Intact FNF.  No meningismus.  PSYCH:  calm, cooperative  SKIN: warm, dry, small abrasion to the right forehead    Vital signs and nursing notes reviewed.          LAB RESULTS  Recent Results (from the past 24 hour(s))   ECG 12 Lead Syncope    Collection Time: 09/03/24  8:45 PM   Result Value Ref Range    QT Interval 386 ms    QTC Interval 508 ms   Comprehensive Metabolic Panel    Collection Time: 09/03/24  8:54 PM    Specimen: Blood   Result Value Ref Range    Glucose 108 (H) 65 - 99 mg/dL    BUN 7 6 - 20 mg/dL    Creatinine 0.82 0.57 - 1.00 mg/dL    Sodium 141 136 - 145 mmol/L    Potassium 3.2 (L) 3.5 - 5.2 mmol/L    Chloride 104 98 - 107 mmol/L    CO2 25.0 22.0 - 29.0 mmol/L    Calcium 10.1 8.6 - 10.5 mg/dL    Total Protein 8.0 6.0 - 8.5 g/dL    Albumin 4.7 3.5 - 5.2 g/dL    ALT (SGPT) 25 1 - 33 U/L    AST (SGOT) 15 1 - 32 U/L    Alkaline Phosphatase 101 39 - 117 U/L    Total Bilirubin 0.3 0.0 - 1.2 mg/dL    Globulin 3.3 gm/dL    A/G Ratio 1.4 g/dL    BUN/Creatinine Ratio 8.5 7.0 - 25.0    Anion Gap 12.0 5.0 - 15.0 mmol/L    eGFR 92.3 >60.0 mL/min/1.73   hCG, Serum, Qualitative    Collection Time: 09/03/24  8:54 PM    Specimen: Blood   Result Value Ref Range    HCG Qualitative Negative Negative    CBC Auto Differential    Collection Time: 09/03/24  8:54 PM    Specimen: Blood   Result Value Ref Range    WBC 9.78 3.40 - 10.80 10*3/mm3    RBC 5.00 3.77 - 5.28 10*6/mm3    Hemoglobin 14.4 12.0 - 15.9 g/dL    Hematocrit 42.2 34.0 - 46.6 %    MCV 84.4 79.0 - 97.0 fL    MCH 28.8 26.6 - 33.0 pg    MCHC 34.1 31.5 - 35.7 g/dL    RDW 13.6 12.3 - 15.4 %    RDW-SD 41.1 37.0 - 54.0 fl    MPV 9.8 6.0 - 12.0 fL    Platelets 396 140 - 450 10*3/mm3    Neutrophil % 75.9 42.7 - 76.0 %    Lymphocyte % 20.0 19.6 - 45.3 %    Monocyte % 3.3 (L) 5.0 - 12.0 %    Eosinophil % 0.3 0.3 - 6.2 %    Basophil % 0.2 0.0 - 1.5 %    Immature Grans % 0.3 0.0 - 0.5 %    Neutrophils, Absolute 7.42 (H) 1.70 - 7.00 10*3/mm3    Lymphocytes, Absolute 1.96 0.70 - 3.10 10*3/mm3    Monocytes, Absolute 0.32 0.10 - 0.90 10*3/mm3    Eosinophils, Absolute 0.03 0.00 - 0.40 10*3/mm3    Basophils, Absolute 0.02 0.00 - 0.20 10*3/mm3    Immature Grans, Absolute 0.03 0.00 - 0.05 10*3/mm3    nRBC 0.0 0.0 - 0.2 /100 WBC   Type & Screen    Collection Time: 09/03/24  8:54 PM    Specimen: Blood   Result Value Ref Range    ABO Type B     RH type Positive     Antibody Screen Negative     T&S Expiration Date 9/6/2024 11:59:59 PM    Urinalysis With Microscopic If Indicated (No Culture) - Straight Cath    Collection Time: 09/03/24  9:04 PM    Specimen: Straight Cath; Urine   Result Value Ref Range    Color, UA Dark Yellow (A) Yellow, Straw    Appearance, UA Clear Clear    pH, UA 7.0 5.0 - 8.0    Specific Gravity, UA 1.028 1.005 - 1.030    Glucose, UA Negative Negative    Ketones, UA 15 mg/dL (1+) (A) Negative    Bilirubin, UA Negative Negative    Blood, UA Negative Negative    Protein, UA Trace (A) Negative    Leuk Esterase, UA Trace (A) Negative    Nitrite, UA Negative Negative    Urobilinogen, UA 4.0 E.U./dL (A) 0.2 - 1.0 E.U./dL   Urinalysis, Microscopic Only - Straight Cath    Collection Time: 09/03/24  9:04 PM    Specimen: Straight Cath; Urine   Result Value Ref Range     RBC, UA 0-2 None Seen, 0-2 /HPF    WBC, UA 0-2 None Seen, 0-2 /HPF    Bacteria, UA None Seen None Seen /HPF    Squamous Epithelial Cells, UA 0-2 None Seen, 0-2 /HPF    Hyaline Casts, UA 0-2 None Seen /LPF    Methodology Automated Microscopy        Ordered the above labs and reviewed the results.        RADIOLOGY  CT Cervical Spine Without Contrast    Result Date: 9/3/2024  CT OF THE CERVICAL SPINE  HISTORY: Neck pain after a fall  COMPARISON: May 4, 2020.  TECHNIQUE: Axial CT imaging was obtained through the cervical spine. Coronal and sagittal reformatted images were obtained.  FINDINGS: No acute fracture or subluxation of the cervical spine is seen. Straightening/reversal of cervical curvature was also seen on the exam from May 4, 2023. Intervertebral disc spaces appear well-maintained. Images through the lung apices are clear. There is no prevertebral soft tissue swelling. Thyroid gland and trachea appear normal. There is no canal stenosis or neuroforaminal narrowing at any level.      No acute fracture or subluxation identified.  Radiation dose reduction techniques were utilized, including automated exposure control and exposure modulation based on body size.   This report was finalized on 9/3/2024 10:12 PM by Dr. Rhina Austin M.D on Workstation: [id number]      CT Head Without Contrast    Result Date: 9/3/2024  CT OF THE HEAD WITHOUT CONTRAST  HISTORY: Fall. Patient is on blood thinners.  COMPARISON: 8/4/2023  TECHNIQUE: Axial CT imaging was obtained through the brain. No IV contrast was administered.  FINDINGS: No acute intracranial hemorrhage is seen. The ventricles are normal in size. There is no midline shift or mass effect. No calvarial fracture is seen. Paranasal sinuses and mastoid air cells are clear.       No acute intracranial findings.  Radiation dose reduction techniques were utilized, including automated exposure control and exposure modulation based on body size.   This report was  finalized on 9/3/2024 10:09 PM by Dr. Rhina Austin M.D on Workstation: [id number]      XR Chest 1 View    Result Date: 9/3/2024  SINGLE VIEW OF THE CHEST  HISTORY: Hypertension  COMPARISON: April 9, 2024  FINDINGS: Heart size is within normal limits. No pneumothorax, pleural effusion, or acute infiltrate is seen.      No acute findings.  This report was finalized on 9/3/2024 9:12 PM by Dr. Rhina Austin M.D on Workstation: [id number]       Ordered the above noted radiological studies. Reviewed by me in PACS.        MEDICATIONS GIVEN IN ER  Medications   sodium chloride 0.9 % flush 10 mL (has no administration in time range)         ORDERS PLACED DURING THIS VISIT:  Orders Placed This Encounter   Procedures    XR Chest 1 View    CT Head Without Contrast    CT Cervical Spine Without Contrast    Comprehensive Metabolic Panel    hCG, Serum, Qualitative    CBC Auto Differential    Urinalysis With Microscopic If Indicated (No Culture) - Urine, Clean Catch    Urinalysis, Microscopic Only - Urine, Clean Catch    Monitor Blood Pressure    Continuous Pulse Oximetry    Inpatient Neurology Consult General    ECG 12 Lead Syncope    Type & Screen    EEG    Insert Peripheral IV    Initiate ED Observation Status    CBC & Differential         OUTPATIENT MEDICATION MANAGEMENT:  Current Facility-Administered Medications Ordered in Epic   Medication Dose Route Frequency Provider Last Rate Last Admin    sodium chloride 0.9 % flush 10 mL  10 mL Intravenous PRN Flaco Man II, MD         Current Outpatient Medications Ordered in Epic   Medication Sig Dispense Refill    amitriptyline (ELAVIL) 75 MG tablet Take 1 tablet by mouth Every Night.      apixaban (ELIQUIS) 5 MG tablet tablet Take 1 tablet by mouth 2 (Two) Times a Day.      Brivaracetam (Briviact) 100 MG tablet Take 1 tablet by mouth 2 (Two) Times a Day.      busPIRone (BUSPAR) 10 MG tablet Take 1 tablet by mouth 2 (Two) Times a Day. (Patient taking  differently: Take 1 tablet by mouth 3 (Three) Times a Day.) 180 tablet 3    carvedilol (Coreg) 3.125 MG tablet Take 1 tablet by mouth 2 (Two) Times a Day With Meals. 180 tablet 3    fluticasone (Flonase) 50 MCG/ACT nasal spray 2 sprays into the nostril(s) as directed by provider Daily for 30 days. Administer 2 sprays in each nostril for each dose. 18.2 mL 3    Fremanezumab-vfrm (Ajovy) 225 MG/1.5ML solution auto-injector Inject  under the skin into the appropriate area as directed Every 30 (Thirty) Days.      hydrOXYzine (ATARAX) 25 MG tablet Take 1 tablet by mouth Every 8 (Eight) Hours As Needed for Anxiety. Do not drive (Patient taking differently: Take 2 tablets by mouth Every 8 (Eight) Hours As Needed for Anxiety. Do not drive) 90 tablet 3    naratriptan (AMERGE) 2.5 MG tablet Take 1 tablet by mouth 1 (One) Time As Needed for Migraine. 2.5 mg at onset of headache, may repeat in 2 hours if needed Max of 2 tabs in 24 hrs.      ondansetron (ZOFRAN) 4 MG tablet Take 1 tablet by mouth Every 8 (Eight) Hours As Needed for Nausea or Vomiting. 30 tablet 3    pantoprazole (PROTONIX) 40 MG EC tablet Take 1 tablet by mouth 2 (Two) Times a Day Before Meals for 30 days. 60 tablet 0    tiZANidine (ZANAFLEX) 4 MG tablet Take 1 tablet by mouth Every 8 (Eight) Hours As Needed for Muscle Spasms (Do not drive). (Patient taking differently: Take 1.5 tablets by mouth Every 8 (Eight) Hours As Needed for Muscle Spasms (Do not drive).) 90 tablet 4    zolpidem (AMBIEN) 10 MG tablet 1 p.o. at bedtime as needed sleep 90 tablet 3       PROCEDURES  Procedures          MEDICAL DECISION MAKING, PROGRESS, and CONSULTS    Discussion below represents my analysis of pertinent findings related to patient's condition, differential diagnosis, treatment plan and final disposition.            Differential diagnosis:    Underlying infectious process causing her seizure, intracranial mass, intracranial hemorrhage specially after having hit her head while  on Eliquis, underlying seizure disorder, medication noncompliance, syncope               Independent interpretation of labs, radiology studies, and discussions with consultants:  ED Course as of 09/03/24 2221   Tue Sep 03, 2024   2037 On medical chart review, reviewed the most recent discharge summary at [facility] from Dr. Sixto DO.  Patient presented with left-sided numbness and weakness.  MRI brain negative.  Patient had multiple complaints and seen reluctant to leave the hospital. [TD]   2057 EKG independently interpreted by myself.  Time 8:45 PM.  Sinus rhythm.  Heart rate 104.  Borderline left axis deviation.  Poor R wave progression. [TD]   2131 WBC: 9.78 [TD]   2131 Hemoglobin: 14.4 [TD]   2131 Leukocytes, UA(!): Trace [TD]   2131 Nitrite, UA: Negative [TD]   2218 I discussed the case Dr. Molina, general neurology.  He recommends EEG but no need for MRI at this time.    I discussed the case with RAFIQ Kern who will admit. [TD]      ED Course User Index  [TD] Flaco Man II, MD           DIAGNOSIS  Final diagnoses:   Seizure   Closed head injury, initial encounter   Chronic anticoagulation         DISPOSITION  Admit      Latest Documented Vital Signs:  As of 22:21 EDT  BP- 152/91 HR- 92 Temp- 98.5 °F (36.9 °C) O2 sat- 100%      --    Please note that portions of this were completed with a voice recognition program.       Note Disclaimer: At [facility], we believe that sharing information builds trust and better relationships. You are receiving this note because you are receiving care at [facility] or recently visited. It is possible you will see health information before a provider has talked with you about it. This kind of information can be easy to misunderstand. To help you fully understand what it means for your health, we urge you to discuss this note with your provider.         Flaco Man II, MD  09/03/24 2221

## 2024-09-04 NOTE — PROGRESS NOTES
ED OBSERVATION PROGRESS/DISCHARGE SUMMARY    Date of Admission: 9/3/2024   LOS: 0 days   PCP: Servando Gao MD    Final Diagnosis breakthrough seizure      Subjective     Hospital Outcome:     Patient is a pleasant afebrile ambulatory 39-year-old female admitted to the ED observation unit for 2 seizures that occurred yesterday when patient was at home.  She has a history of seizures but last episode was over 5 years ago.  She endorses compliance with Briviact.    This morning she endorses mild generalized headache with associated nausea this was treated with IV fluids a single dose of Toradol and magnesium.  EEG performed and results of this are pending.  Seen and evaluated by RAFIQ and MD with neurology team who recommends initiation of patient on Vimpat with dose to be 50 mg twice daily for a week and then 100 mg twice daily thereafter.  Request patient be taken off of work for the next week to help acclimate to additional drug therapy.  Patient is agreeable to plan.    ROS:  General: no fevers, chills  Respiratory: no cough, dyspnea  Cardiovascular: no chest pain, palpitations  Abdomen: No abdominal pain, nausea, vomiting, or diarrhea  Neurologic: No focal weakness, + headache    Objective   Physical Exam:  I have reviewed the vital signs.  Temp:  [97.9 °F (36.6 °C)-98.5 °F (36.9 °C)] 97.9 °F (36.6 °C)  Heart Rate:  [] 93  Resp:  [16-18] 18  BP: (117-155)/(79-98) 117/79  General Appearance:    Alert, cooperative, no distress  Head:    Normocephalic, atraumatic  Eyes:    Sclerae anicteric  Neck:   Supple, no mass  Lungs: Clear to auscultation bilaterally, respirations unlabored  Heart: Regular rate and rhythm, S1 and S2 normal, no murmur, rub or gallop  Abdomen:  Soft, nontender, bowel sounds active, nondistended  Extremities: No clubbing, cyanosis, or edema to lower extremities  Pulses:  2+ and symmetric in distal lower extremities  Skin: No rashes .  Psychiatric: Flat affect, nonsuicidal  Neurologic:  Oriented x3, Normal strength to extremities    Results Review:    I have reviewed the labs, radiology results and diagnostic studies.    Results from last 7 days   Lab Units 09/03/24 2054   WBC 10*3/mm3 9.78   HEMOGLOBIN g/dL 14.4   HEMATOCRIT % 42.2   PLATELETS 10*3/mm3 396     Results from last 7 days   Lab Units 09/03/24 2054   SODIUM mmol/L 141   POTASSIUM mmol/L 3.2*   CHLORIDE mmol/L 104   CO2 mmol/L 25.0   BUN mg/dL 7   CREATININE mg/dL 0.82   CALCIUM mg/dL 10.1   BILIRUBIN mg/dL 0.3   ALK PHOS U/L 101   ALT (SGPT) U/L 25   AST (SGOT) U/L 15   GLUCOSE mg/dL 108*     Imaging Results (Last 24 Hours)       Procedure Component Value Units Date/Time    CT Cervical Spine Without Contrast [[id number]] Collected: 09/03/24 2209     Updated: 09/03/24 2215    Narrative:      CT OF THE CERVICAL SPINE     HISTORY: Neck pain after a fall     COMPARISON: May 4, 2020.     TECHNIQUE: Axial CT imaging was obtained through the cervical spine.  Coronal and sagittal reformatted images were obtained.     FINDINGS:  No acute fracture or subluxation of the cervical spine is seen.  Straightening/reversal of cervical curvature was also seen on the exam  from May 4, 2023. Intervertebral disc spaces appear well-maintained.  Images through the lung apices are clear. There is no prevertebral soft  tissue swelling. Thyroid gland and trachea appear normal. There is no  canal stenosis or neuroforaminal narrowing at any level.       Impression:      No acute fracture or subluxation identified.     Radiation dose reduction techniques were utilized, including automated  exposure control and exposure modulation based on body size.        This report was finalized on 9/3/2024 10:12 PM by Dr. Rhina Austin M.D on Workstation: [id number]       CT Head Without Contrast [[id number]] Collected: 09/03/24 2207     Updated: 09/03/24 2212    Narrative:      CT OF THE HEAD WITHOUT CONTRAST     HISTORY: Fall. Patient is on blood thinners.      COMPARISON: 8/4/2023     TECHNIQUE: Axial CT imaging was obtained through the brain. No IV  contrast was administered.      FINDINGS:  No acute intracranial hemorrhage is seen. The ventricles are normal in  size. There is no midline shift or mass effect. No calvarial fracture is  seen. Paranasal sinuses and mastoid air cells are clear.          Impression:      No acute intracranial findings.     Radiation dose reduction techniques were utilized, including automated  exposure control and exposure modulation based on body size.        This report was finalized on 9/3/2024 10:09 PM by Dr. Rhina Austin M.D on Workstation: [id number]       XR Chest 1 View [[id number]] Collected: 09/03/24 2112     Updated: 09/03/24 2116    Narrative:      SINGLE VIEW OF THE CHEST     HISTORY: Hypertension     COMPARISON: April 9, 2024     FINDINGS:  Heart size is within normal limits. No pneumothorax, pleural effusion,  or acute infiltrate is seen.       Impression:      No acute findings.     This report was finalized on 9/3/2024 9:12 PM by Dr. Rhina Austin M.D on Workstation: [id number]               I have reviewed the medications.  ---------------------------------------------------------------------------------------------  Assessment & Plan   Assessment/Problem List    Seizure      Plan:    Seizures  -Cardiac monitoring eventful  -Seizure precautions  -EEG-results pending  -Continue home antiseizure medications  -Neurology consult, cleared for discharge home with Vimpat 50 twice daily for 1 week then 100 mg twice daily thereafter     Diabetes  -Resume home medications at discharge  -Hemoglobin A1c-5.50     History of pulmonary embolism  Factor V Leiden mutation  -Continue home dose Eliquis    Disposition: Home    Follow-up after Discharge: PCP and neurology    This note will serve as a discharge summary    RAFIQ Gonsalez 09/04/24 07:42 EDT    I have worn appropriate PPE during this patient encounter, sanitized  my hands both with entering and exiting patient's room.      40 minutes has been spent by [facility] providers in the care of this patient while under observation status

## 2024-09-04 NOTE — PROGRESS NOTES
JASON VELA Attestation Note    I supervised care provided by the midlevel provider.    The EPI and I have discussed this patient's history, physical exam, and treatment plan. I have reviewed the documentation and personally had a face to face interaction with the patient  I affirm the documentation and agree with the treatment and plan. I provided a substantive portion of the care of this patient.  I personally performed the physical exam, in its entirety.  My personal findings are documented in below:    History:  Patient with history of diabetes and seizure disorder is admitted to observation unit for management and consultation regarding 2 separate breakthrough seizures recently.  She had been seizure-free for many years on taking her antiepileptic medication as directed.  She has been experiencing some URI symptoms recently and had been taking some over-the-counter medications for symptom control.  This morning she is complaining of a persistent headache but no other new concerns.    Physical Exam:  General: No acute distress.  HENT: NCAT, PERRL, Nares patent.  Eyes: no scleral icterus.  Neck: trachea midline, no ROM limitations.  CV: Pink warm and well-perfused throughout  Respiratory: No distress or increased work of breathing  Abdomen: soft, no focal tenderness or rigidity  Musculoskeletal: no deformity.  Neuro: alert, moves all extremities, follows commands.  Skin: warm, dry.    Assessment and Plan:  Seizures: EEG pending.  Seizure precautions.  Neurology consult.  Continue brivaracetam    Diabetes: Accu-Cheks.  Low-dose subcutaneous insulin management.
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
## 2024-09-04 NOTE — DISCHARGE INSTRUCTIONS
Seizure Safety   Please remember that it is [place] state law that if you suffer a recurrent seizure, any seizure like event/alteration of consciousness, or loss of consciousness regardless of cause/etiology- you CAN NOT drive for 90 days.     Until your events/spells/seizures are under good control and you have received medical clearance, it is my medical recommendation that you not operate a motor vehicle or place yourself in any situation wherein loss of consciousness could cause harm to yourself or others. This includes, but is not limited to, working at heights, working around flame and heat (I.e., cooking, campfire, welding), working with or around heavy machinery, swimming without direct supervision (always wear a life jacket in open bodies of water such as a lake, river, ocean, etc), working with firearms and hunting equipment, and bathing without supervision.     According to the laws of the [facility], you are to refrain from driving for 90 days from the date of your last episode of loss of consciousness for any reason. If you live in another state you must comply w/ their state laws surrounding seizure/syncope.     Seizures lasting 5 minutes or longer (3 minutes, if you live in a very rural community), seizures that occur back-to-back in clusters, seizures in pregnancy or any seizure that results in serious injury: CALL 911.    Seizures that are shorter than 5 minutes or if you are able to respond: you may call our office to notify us.    Please avoid seizure triggers including but not limited to, sleep deprivation, poor sleep patterns, sick contacts, any alcohol or illicit drug use, missing medications.     SUDEP is the sudden, unexpected death of someone with epilepsy, who was otherwise healthy. In SUDEP cases, no other cause of death is found when an autopsy is done. Each year, more than 1 out of 1,000 people with epilepsy die from SUDEP. If seizures are uncontrolled, the risk of SUDEP increases to  more than 1 out of 150. These sudden deaths are rare in children, but are the leading cause of death in young adults with uncontrolled seizures.     For more information visit: http://www.epilepsy.com/learn/impact/mortality/sudep    Women: if you become pregnant or plan to become pregnant, please contact our office right away.    Facts About Epilepsy And Seizures:  -You DO NOT walk alone.  -65 MILLION: Number of people around the world who have epilepsy.  -3.4 MILLION: Number of people in the [place] who have epilepsy.  -1 IN 10 people in the [place] will have a seizure at some point in their lifetime.  -1 IN 26 people in the [place] will develop epilepsy at some point in their lifetime.  -150,000: Number of new cases of epilepsy in the [place] each year.  -ONE-THIRD: Number of people with epilepsy who live with uncontrollable seizures because no available treatment works for them.  -6 OUT OF 10: Number of people with epilepsy where the cause is unknown.    For more information/resources please visit:     Epilepsy.com - the website of the Epile[employer]. Full of educational information verified by doctors.    Patients Like Me - Epilepsy ([email or web address]-[email or web address]): a website full of patient reported information about epilepsy. Information is monitored by doctors so that incorrect information is removed.    Thank you for allowing me to participate in your care! Please call my office with any questions, concerns, difficulty obtaining medication, or seizure symptoms, even if after hours.

## 2024-09-04 NOTE — ED TRIAGE NOTES
Pt from home via ems, called for bilat leg weakness, decreased sensation to left side (hx of cva with left sided residual), started Sunday; today pt remembers getting in car around 5pm, but then woke up on the bathroom floor at 743pm, did have loss of bladder; had 2 falls on Sunday; does have a headache, abrasion to forehead, on eliquis

## 2024-10-08 ENCOUNTER — TRANSCRIBE ORDERS (OUTPATIENT)
Dept: ADMINISTRATIVE | Facility: HOSPITAL | Age: 39
End: 2024-10-08
Payer: COMMERCIAL

## 2024-10-08 DIAGNOSIS — R56.9 SEIZURE: Primary | ICD-10-CM

## 2025-02-10 ENCOUNTER — HOSPITAL ENCOUNTER (EMERGENCY)
Facility: HOSPITAL | Age: 40
Discharge: HOME | End: 2025-02-10
Attending: EMERGENCY MEDICINE | Admitting: EMERGENCY MEDICINE
Payer: COMMERCIAL

## 2025-02-10 VITALS
HEART RATE: 89 BPM | OXYGEN SATURATION: 100 % | RESPIRATION RATE: 17 BRPM | TEMPERATURE: 98.3 F | DIASTOLIC BLOOD PRESSURE: 88 MMHG | SYSTOLIC BLOOD PRESSURE: 143 MMHG

## 2025-02-10 DIAGNOSIS — Z79.01 CHRONIC ANTICOAGULATION: ICD-10-CM

## 2025-02-10 DIAGNOSIS — R00.2 PALPITATIONS: ICD-10-CM

## 2025-02-10 DIAGNOSIS — B34.2 CORONAVIRUS INFECTION: Primary | ICD-10-CM

## 2025-02-10 LAB
ALBUMIN SERPL-MCNC: 4.3 G/DL (ref 3.5–5.2)
ALBUMIN/GLOB SERPL: 1.2 G/DL
ALP SERPL-CCNC: 78 U/L (ref 39–117)
ALT SERPL W P-5'-P-CCNC: 31 U/L (ref 1–33)
ANION GAP SERPL CALCULATED.3IONS-SCNC: 10.1 MMOL/L (ref 5–15)
AST SERPL-CCNC: 21 U/L (ref 1–32)
B PARAPERT DNA SPEC QL NAA+PROBE: NOT DETECTED
B PERT DNA SPEC QL NAA+PROBE: NOT DETECTED
BASOPHILS # BLD AUTO: 0.03 10*3/MM3 (ref 0–0.2)
BASOPHILS NFR BLD AUTO: 0.4 % (ref 0–1.5)
BILIRUB SERPL-MCNC: <0.2 MG/DL (ref 0–1.2)
BUN SERPL-MCNC: 7 MG/DL (ref 6–20)
BUN/CREAT SERPL: 6.9 (ref 7–25)
C PNEUM DNA NPH QL NAA+NON-PROBE: NOT DETECTED
CALCIUM SPEC-SCNC: 10 MG/DL (ref 8.6–10.5)
CHLORIDE SERPL-SCNC: 102 MMOL/L (ref 98–107)
CO2 SERPL-SCNC: 26.9 MMOL/L (ref 22–29)
CREAT SERPL-MCNC: 1.02 MG/DL (ref 0.57–1)
DEPRECATED RDW RBC AUTO: 38.5 FL (ref 37–54)
EGFRCR SERPLBLD CKD-EPI 2021: 71 ML/MIN/1.73
EOSINOPHIL # BLD AUTO: 0.16 10*3/MM3 (ref 0–0.4)
EOSINOPHIL NFR BLD AUTO: 1.9 % (ref 0.3–6.2)
ERYTHROCYTE [DISTWIDTH] IN BLOOD BY AUTOMATED COUNT: 12.8 % (ref 12.3–15.4)
FLUAV SUBTYP SPEC NAA+PROBE: NOT DETECTED
FLUBV RNA ISLT QL NAA+PROBE: NOT DETECTED
GEN 5 1HR TROPONIN T REFLEX: <6 NG/L
GLOBULIN UR ELPH-MCNC: 3.5 GM/DL
GLUCOSE SERPL-MCNC: 128 MG/DL (ref 65–99)
HADV DNA SPEC NAA+PROBE: NOT DETECTED
HCOV 229E RNA SPEC QL NAA+PROBE: NOT DETECTED
HCOV HKU1 RNA SPEC QL NAA+PROBE: NOT DETECTED
HCOV NL63 RNA SPEC QL NAA+PROBE: DETECTED
HCOV OC43 RNA SPEC QL NAA+PROBE: NOT DETECTED
HCT VFR BLD AUTO: 38 % (ref 34–46.6)
HGB BLD-MCNC: 13 G/DL (ref 12–15.9)
HMPV RNA NPH QL NAA+NON-PROBE: NOT DETECTED
HOLD SPECIMEN: NORMAL
HOLD SPECIMEN: NORMAL
HPIV1 RNA ISLT QL NAA+PROBE: NOT DETECTED
HPIV2 RNA SPEC QL NAA+PROBE: NOT DETECTED
HPIV3 RNA NPH QL NAA+PROBE: NOT DETECTED
HPIV4 P GENE NPH QL NAA+PROBE: NOT DETECTED
IMM GRANULOCYTES # BLD AUTO: 0.03 10*3/MM3 (ref 0–0.05)
IMM GRANULOCYTES NFR BLD AUTO: 0.4 % (ref 0–0.5)
LYMPHOCYTES # BLD AUTO: 2.55 10*3/MM3 (ref 0.7–3.1)
LYMPHOCYTES NFR BLD AUTO: 29.9 % (ref 19.6–45.3)
M PNEUMO IGG SER IA-ACNC: NOT DETECTED
MCH RBC QN AUTO: 28.9 PG (ref 26.6–33)
MCHC RBC AUTO-ENTMCNC: 34.2 G/DL (ref 31.5–35.7)
MCV RBC AUTO: 84.4 FL (ref 79–97)
MONOCYTES # BLD AUTO: 0.39 10*3/MM3 (ref 0.1–0.9)
MONOCYTES NFR BLD AUTO: 4.6 % (ref 5–12)
NEUTROPHILS NFR BLD AUTO: 5.37 10*3/MM3 (ref 1.7–7)
NEUTROPHILS NFR BLD AUTO: 62.8 % (ref 42.7–76)
NRBC BLD AUTO-RTO: 0 /100 WBC (ref 0–0.2)
NT-PROBNP SERPL-MCNC: <36 PG/ML (ref 0–450)
PLATELET # BLD AUTO: 347 10*3/MM3 (ref 140–450)
PMV BLD AUTO: 9.5 FL (ref 6–12)
POTASSIUM SERPL-SCNC: 3.4 MMOL/L (ref 3.5–5.2)
PROT SERPL-MCNC: 7.8 G/DL (ref 6–8.5)
QT INTERVAL: 327 MS
QTC INTERVAL: 435 MS
RBC # BLD AUTO: 4.5 10*6/MM3 (ref 3.77–5.28)
RHINOVIRUS RNA SPEC NAA+PROBE: NOT DETECTED
RSV RNA NPH QL NAA+NON-PROBE: NOT DETECTED
SARS-COV-2 RNA NPH QL NAA+NON-PROBE: NOT DETECTED
SODIUM SERPL-SCNC: 139 MMOL/L (ref 136–145)
TROPONIN T NUMERIC DELTA: NORMAL
TROPONIN T SERPL HS-MCNC: 7 NG/L
WBC NRBC COR # BLD AUTO: 8.53 10*3/MM3 (ref 3.4–10.8)
WHOLE BLOOD HOLD COAG: NORMAL
WHOLE BLOOD HOLD SPECIMEN: NORMAL

## 2025-02-10 PROCEDURE — 80053 COMPREHEN METABOLIC PANEL: CPT

## 2025-02-10 PROCEDURE — 99283 EMERGENCY DEPT VISIT LOW MDM: CPT

## 2025-02-10 PROCEDURE — 83880 ASSAY OF NATRIURETIC PEPTIDE: CPT

## 2025-02-10 PROCEDURE — 93005 ELECTROCARDIOGRAM TRACING: CPT

## 2025-02-10 PROCEDURE — 84484 ASSAY OF TROPONIN QUANT: CPT | Performed by: EMERGENCY MEDICINE

## 2025-02-10 PROCEDURE — 0202U NFCT DS 22 TRGT SARS-COV-2: CPT | Performed by: EMERGENCY MEDICINE

## 2025-02-10 PROCEDURE — 93010 ELECTROCARDIOGRAM REPORT: CPT | Performed by: INTERNAL MEDICINE

## 2025-02-10 PROCEDURE — 85025 COMPLETE CBC W/AUTO DIFF WBC: CPT

## 2025-02-10 PROCEDURE — 93005 ELECTROCARDIOGRAM TRACING: CPT | Performed by: EMERGENCY MEDICINE

## 2025-02-10 PROCEDURE — 36415 COLL VENOUS BLD VENIPUNCTURE: CPT

## 2025-02-10 PROCEDURE — 84484 ASSAY OF TROPONIN QUANT: CPT

## 2025-02-10 RX ORDER — GUAIFENESIN AND DEXTROMETHORPHAN HYDROBROMIDE 600; 30 MG/1; MG/1
1 TABLET, EXTENDED RELEASE ORAL 2 TIMES DAILY PRN
Qty: 20 TABLET | Refills: 0 | Status: SHIPPED | OUTPATIENT
Start: 2025-02-10

## 2025-02-10 RX ORDER — SODIUM CHLORIDE 0.9 % (FLUSH) 0.9 %
10 SYRINGE (ML) INJECTION AS NEEDED
Status: DISCONTINUED | OUTPATIENT
Start: 2025-02-10 | End: 2025-02-11 | Stop reason: HOSPADM

## 2025-02-10 NOTE — Clinical Note
[facility] EMERGENCY DEPARTMENT  [address] [facility] [address]  Phone: [phone]    Liya Carlson was seen and treated in our emergency department on 2/10/2025.  She may return to work on 02/13/2025.         Thank you for choosing [facility].    Brendan Varela MD

## 2025-02-10 NOTE — ED TRIAGE NOTES
Patient to ER via car from [facility] for soa cough and fast heart rate    Patient has hx of PE  Patient works in hospital

## 2025-02-11 NOTE — DISCHARGE INSTRUCTIONS
Take cough medication as prescribed.  Take Tylenol as needed for fever.  Return to the emergency department for worsening/persistent symptoms, chest pain, fever, vomiting, or other concern.

## 2025-02-11 NOTE — ED PROVIDER NOTES
EMERGENCY DEPARTMENT ENCOUNTER    Room Number:  20/20  PCP: Servando Gao MD  Historian: Patient    I initially evaluated the patient at 1949    HPI:  Chief Complaint: Cough, runny nose, shortness of breath, palpitations  A complete HPI/ROS/PMH/PSH/SH/FH are unobtainable due to: Nothing  Context: Liya Carlson is a 39 y.o. female with a medical history of pulmonary embolus who presents to the ED c/o acute cough, runny nose, shortness of breath, palpitations.  Symptoms began 2 days ago.  Cough is nonproductive.  Nasal drainage is clear.  Shortness of breath is intermittent and worse with exertion.  She has had some palpitations and feels like her heart is racing.  Denies fever, chest pain, dizziness, syncope, abdominal pain, vomiting, diarrhea, leg pain, or leg swelling.  She was seen at the immediate care center earlier today.  COVID, flu, and strep test were negative.  Chest x-ray was negative acute.  She was sent to the ED for further evaluation.  She takes Eliquis and has not missed any doses.  Denies history of asthma or heart disease.  Patient works in a hospital.            PAST MEDICAL HISTORY  Active Ambulatory Problems     Diagnosis Date Noted    Seizures     Pancreatitis     Gastric ulcer     Dyslipidemia 03/18/2016    Environmental allergies 03/18/2016    Intractable migraine without aura and without status migrainosus 08/08/2017    Single subsegmental pulmonary embolism without acute cor pulmonale 09/08/2022    Abnormal CT scan, sigmoid colon 09/08/2022    Menorrhagia with regular cycle 05/08/2018    Muscle spasms of lower extremity 10/07/2022    COVID-19 virus infection 01/23/2023    Abdominal pain 09/23/2015    Chest pain 04/10/2024    History of pulmonary embolism 04/10/2024    Chronic anticoagulation 04/10/2024    Rectal bleeding 04/10/2024    Factor V Leiden 04/10/2024    Anxiety disorder 04/11/2024    Seizure 09/03/2024     Resolved Ambulatory Problems     Diagnosis Date Noted    Dysphagia  04/10/2024     Past Medical History:   Diagnosis Date    Chest wall contusion     Contusion, abdominal wall     Headache, tension-type     History of blood clots     Migraine     Pulmonary embolism          PAST SURGICAL HISTORY  Past Surgical History:   Procedure Laterality Date    COLONOSCOPY N/A 4/12/2024    Procedure: COLONOSCOPY to cecum with cold polypectomy;  Surgeon: Sha Castelan MD;  Location: [facility] ENDOSCOPY;  Service: Gastroenterology;  Laterality: N/A;  PRE - abd pain, ractal bleeding  POST - polyp, hemorrhoids    ENDOSCOPY N/A 4/12/2024    Procedure: ESOPHAGOGASTRODUODENOSCOPY with  biopsies;  Surgeon: Sha Castelan MD;  Location: [facility] ENDOSCOPY;  Service: Gastroenterology;  Laterality: N/A;  POST - possible candida, gastritis    LAPAROSCOPIC TUBAL LIGATION      SIGMOIDOSCOPY N/A 9/11/2022    Procedure: SIGMOIDOSCOPY FLEXIBLE TO DESCENDING COLON;  Surgeon: Sierra Kerr MD;  Location: [facility] ENDOSCOPY;  Service: Gastroenterology;  Laterality: N/A;  PRE- ABDOMINAL PAIN, ABNORMAL CT  POST- SMALL HEMORRHOIDS         FAMILY HISTORY  Family History   Problem Relation Age of Onset    Diabetes Mother     Hypertension Mother     Migraines Mother     Dementia Mother     Arthritis Mother     Kidney disease Mother     Colon polyps Father     Hypertension Father     Diabetes Father     Seizures Father     Stroke Father     Colon cancer Paternal Aunt     Colon polyps Paternal Aunt     Colon cancer Paternal Grandmother     Colon polyps Paternal Grandmother          SOCIAL HISTORY  Social History     Socioeconomic History    Marital status:    Tobacco Use    Smoking status: Never    Smokeless tobacco: Never   Vaping Use    Vaping status: Never Used   Substance and Sexual Activity    Alcohol use: Not Currently     Comment: OCCASIONAL    Drug use: No    Sexual activity: Defer         ALLERGIES  Patient has no known allergies.    REVIEW OF SYSTEMS  Review of Systems  Included in HPI  All systems  reviewed and negative except for those discussed in HPI.      PHYSICAL EXAM  ED Triage Vitals   Temp Heart Rate Resp BP SpO2   02/10/25 1856 02/10/25 1856 02/10/25 1856 02/10/25 1907 02/10/25 1856   98 °F (36.7 °C) (!) 123 18 162/98 100 %      Temp src Heart Rate Source Patient Position BP Location FiO2 (%)   -- -- 02/10/25 1907 02/10/25 1907 --     Sitting Left arm        Physical Exam      GENERAL: Awake, alert, oriented x 3.  Well-developed, well-nourished and nontoxic-appearing female.  Resting comfortably in no acute distress  HENT: NCAT, nares patent, oropharynx is benign  EYES: no scleral icterus  CV: regular rhythm, normal rate  RESPIRATORY: normal effort, clear to auscultation bilaterally  ABDOMEN: soft, nondistended, nontender, no CVA tenderness  MUSCULOSKELETAL: Extremities are nontender with full range of motion.  No calf tenderness.  No pedal edema  NEURO: Speech is normal.  No facial droop.  PSYCH:  calm, cooperative  SKIN: warm, dry    Vital signs and nursing notes reviewed.          LAB RESULTS  Recent Results (from the past 24 hours)   ECG 12 Lead ED Triage Standing Order; SOA    Collection Time: 02/10/25  6:58 PM   Result Value Ref Range    QT Interval 327 ms    QTC Interval 435 ms   Comprehensive Metabolic Panel    Collection Time: 02/10/25  7:03 PM    Specimen: Arm, Right; Blood   Result Value Ref Range    Glucose 128 (H) 65 - 99 mg/dL    BUN 7 6 - 20 mg/dL    Creatinine 1.02 (H) 0.57 - 1.00 mg/dL    Sodium 139 136 - 145 mmol/L    Potassium 3.4 (L) 3.5 - 5.2 mmol/L    Chloride 102 98 - 107 mmol/L    CO2 26.9 22.0 - 29.0 mmol/L    Calcium 10.0 8.6 - 10.5 mg/dL    Total Protein 7.8 6.0 - 8.5 g/dL    Albumin 4.3 3.5 - 5.2 g/dL    ALT (SGPT) 31 1 - 33 U/L    AST (SGOT) 21 1 - 32 U/L    Alkaline Phosphatase 78 39 - 117 U/L    Total Bilirubin <0.2 0.0 - 1.2 mg/dL    Globulin 3.5 gm/dL    A/G Ratio 1.2 g/dL    BUN/Creatinine Ratio 6.9 (L) 7.0 - 25.0    Anion Gap 10.1 5.0 - 15.0 mmol/L    eGFR 71.0 >60.0  mL/min/1.73   BNP    Collection Time: 02/10/25  7:03 PM    Specimen: Arm, Right; Blood   Result Value Ref Range    proBNP <36.0 0.0 - 450.0 pg/mL   High Sensitivity Troponin T    Collection Time: 02/10/25  7:03 PM    Specimen: Arm, Right; Blood   Result Value Ref Range    HS Troponin T 7 <14 ng/L   Green Top (Gel)    Collection Time: 02/10/25  7:03 PM   Result Value Ref Range    Extra Tube Hold for add-ons.    Lavender Top    Collection Time: 02/10/25  7:03 PM   Result Value Ref Range    Extra Tube hold for add-on    Gold Top - SST    Collection Time: 02/10/25  7:03 PM   Result Value Ref Range    Extra Tube Hold for add-ons.    Light Blue Top    Collection Time: 02/10/25  7:03 PM   Result Value Ref Range    Extra Tube Hold for add-ons.    CBC Auto Differential    Collection Time: 02/10/25  7:03 PM    Specimen: Arm, Right; Blood   Result Value Ref Range    WBC 8.53 3.40 - 10.80 10*3/mm3    RBC 4.50 3.77 - 5.28 10*6/mm3    Hemoglobin 13.0 12.0 - 15.9 g/dL    Hematocrit 38.0 34.0 - 46.6 %    MCV 84.4 79.0 - 97.0 fL    MCH 28.9 26.6 - 33.0 pg    MCHC 34.2 31.5 - 35.7 g/dL    RDW 12.8 12.3 - 15.4 %    RDW-SD 38.5 37.0 - 54.0 fl    MPV 9.5 6.0 - 12.0 fL    Platelets 347 140 - 450 10*3/mm3    Neutrophil % 62.8 42.7 - 76.0 %    Lymphocyte % 29.9 19.6 - 45.3 %    Monocyte % 4.6 (L) 5.0 - 12.0 %    Eosinophil % 1.9 0.3 - 6.2 %    Basophil % 0.4 0.0 - 1.5 %    Immature Grans % 0.4 0.0 - 0.5 %    Neutrophils, Absolute 5.37 1.70 - 7.00 10*3/mm3    Lymphocytes, Absolute 2.55 0.70 - 3.10 10*3/mm3    Monocytes, Absolute 0.39 0.10 - 0.90 10*3/mm3    Eosinophils, Absolute 0.16 0.00 - 0.40 10*3/mm3    Basophils, Absolute 0.03 0.00 - 0.20 10*3/mm3    Immature Grans, Absolute 0.03 0.00 - 0.05 10*3/mm3    nRBC 0.0 0.0 - 0.2 /100 WBC   High Sensitivity Troponin T 1Hr    Collection Time: 02/10/25  8:26 PM    Specimen: Blood   Result Value Ref Range    HS Troponin T <6 <14 ng/L    Troponin T Numeric Delta     Respiratory Panel PCR  w/COVID-19(SARS-CoV-2) KEV/JA/AMNA/PAD/COR/MARTHA In-House, NP Swab in UTM/VTM, 2 HR TAT - Swab, Nasopharynx    Collection Time: 02/10/25  8:26 PM    Specimen: Nasopharynx; Swab   Result Value Ref Range    ADENOVIRUS, PCR Not Detected Not Detected    Coronavirus 229E Not Detected Not Detected    Coronavirus HKU1 Not Detected Not Detected    Coronavirus NL63 Detected (A) Not Detected    Coronavirus OC43 Not Detected Not Detected    COVID19 Not Detected Not Detected - Ref. Range    Human Metapneumovirus Not Detected Not Detected    Human Rhinovirus/Enterovirus Not Detected Not Detected    Influenza A PCR Not Detected Not Detected    Influenza B PCR Not Detected Not Detected    Parainfluenza Virus 1 Not Detected Not Detected    Parainfluenza Virus 2 Not Detected Not Detected    Parainfluenza Virus 3 Not Detected Not Detected    Parainfluenza Virus 4 Not Detected Not Detected    RSV, PCR Not Detected Not Detected    Bordetella pertussis pcr Not Detected Not Detected    Bordetella parapertussis PCR Not Detected Not Detected    Chlamydophila pneumoniae PCR Not Detected Not Detected    Mycoplasma pneumo by PCR Not Detected Not Detected       Ordered the above labs and reviewed the results.        RADIOLOGY  XR Chest 2 View    Result Date: 2/10/2025  REVIEWING YOUR TEST RESULTS IN [facility] IS NOT A SUBSTITUTE FOR DISCUSSING THOSE RESULTS WITH YOUR HEALTH CARE PROVIDER. PLEASE CONTACT YOUR PROVIDER VIA [facility] TO DISCUSS ANY QUESTIONS OR CONCERNS YOU MAY HAVE REGARDING THESE TEST RESULTS.  RADIOLOGY REPORT FACILITY:  [facility] UNIT/AGE/GENDER: P.[email or web address]  OP      AGE:39 Y          SEX:F PATIENT NAME/:  TOSHIA AYERS F    1985 UNIT NUMBER:  [id number] ACCOUNT NUMBER:  [id number] ACCESSION NUMBER:  [id number] EXAM: XR CHEST 2VW ACCESSION NUMBER: [id number] DATE: 02/10/2025 6:27 PM PROVIDED INDICATION: Shortness of breath and tachycardia COMPARISON: 2024 TECHNIQUE: PA and lateral  views of the chest were performed. FINDINGS: The heart, mediastinum and pulmonary vasculature are normal. The lungs are clear without focal consolidation. No pleural effusion or pneumothorax. No acute osseous abnormality. IMPRESSION:   1.No acute cardiopulmonary disease. Dictated by: Rosa Isela Mclean M.D. Images and Report reviewed and interpreted by: Rosa Isela Mclean M.D. <PS><Electronically signed by: Rosa Isela Mclean M.D.> 02/10/2025 1853 D: 02/10/2025 1851 T: 02/10/2025 1851     Ordered the above noted radiological studies. Reviewed by me in PACS.            PROCEDURES  Procedures        OUTPATIENT MEDICATION MANAGEMENT:  Current Facility-Administered Medications Ordered in Epic   Medication Dose Route Frequency Provider Last Rate Last Admin    sodium chloride 0.9 % flush 10 mL  10 mL Intravenous PRN Brendan Varela MD         Current Outpatient Medications Ordered in Epic   Medication Sig Dispense Refill    amitriptyline (ELAVIL) 75 MG tablet Take 1 tablet by mouth Every Night.      apixaban (ELIQUIS) 5 MG tablet tablet Take 1 tablet by mouth 2 (Two) Times a Day.      Atogepant (Qulipta) 60 MG tablet Take 1 tablet by mouth Daily.      Brivaracetam (Briviact) 100 MG tablet Take 1 tablet by mouth 2 (Two) Times a Day.      busPIRone (BUSPAR) 10 MG tablet Take 1 tablet by mouth 2 (Two) Times a Day. (Patient taking differently: Take 1 tablet by mouth 3 (Three) Times a Day.) 180 tablet 3    carvedilol (Coreg) 3.125 MG tablet Take 1 tablet by mouth 2 (Two) Times a Day With Meals. 180 tablet 3    famotidine (PEPCID) 20 MG tablet Take 1 tablet by mouth 2 (Two) Times a Day.      fluticasone (Flonase) 50 MCG/ACT nasal spray 2 sprays into the nostril(s) as directed by provider Daily for 30 days. Administer 2 sprays in each nostril for each dose. 18.2 mL 3    Fremanezumab-vfrm (Ajovy) 225 MG/1.5ML solution auto-injector Inject  under the skin into the appropriate area as directed Every 30 (Thirty) Days.       guaifenesin-dextromethorphan 600-30 mg (MUCINEX DM)  MG tablet sustained-release 12 hour Take 1 tablet by mouth 2 (Two) Times a Day As Needed (Cough). 20 tablet 0    HYDROcodone-acetaminophen (NORCO) 5-325 MG per tablet Take 1 tablet by mouth Every 6 (Six) Hours As Needed for Moderate Pain.      lacosamide (Vimpat) 50 MG tablet tablet Take 1 tablet by mouth Every 12 (Twelve) Hours for 7 days, THEN 2 tablets Every 12 (Twelve) Hours for 28 days. 60 tablet 3    LORazepam (ATIVAN) 0.5 MG tablet Take 1 tablet by mouth twice daily as needed panic attacks      ondansetron (ZOFRAN) 4 MG tablet Take 1 tablet by mouth Every 8 (Eight) Hours As Needed for Nausea or Vomiting. 30 tablet 3    pantoprazole (PROTONIX) 40 MG EC tablet Take 1 tablet by mouth 2 (Two) Times a Day Before Meals for 30 days. 60 tablet 0    promethazine-dextromethorphan (PROMETHAZINE-DM) 6.25-15 MG/5ML syrup Take 5 mL by mouth 4 (Four) Times a Day As Needed for Cough. 118 mL 0    rosuvastatin (CRESTOR) 10 MG tablet Take 1 tablet by mouth Daily.      tiZANidine (ZANAFLEX) 4 MG tablet Take 1 tablet by mouth Every 8 (Eight) Hours As Needed for Muscle Spasms (Do not drive). (Patient taking differently: Take 1.5 tablets by mouth Every 8 (Eight) Hours As Needed for Muscle Spasms (Do not drive).) 90 tablet 4    zolpidem (AMBIEN) 10 MG tablet 1 p.o. at bedtime as needed sleep 90 tablet 3           MEDICATIONS GIVEN IN ER  Medications   sodium chloride 0.9 % flush 10 mL (has no administration in time range)                   MEDICAL DECISION MAKING, PROGRESS, and CONSULTS    All labs have been independently reviewed by me.  All radiology studies have been reviewed by me and I have also reviewed the radiology report.   EKG's independently viewed and interpreted by me.  Discussion below represents my analysis of pertinent findings related to patient's condition, differential diagnosis, treatment plan and final disposition.      Additional sources:    -  Discussed/ obtained information from independent historians: None    - External (non-ED) record review: Patient was seen at the immediate care center earlier today for nasal congestion, sore throat, and shortness of breath.  She was noted to have a heart rate of 110.  COVID, flu, and strep test were negative.  Chest x-ray was negative acute.  She was sent to the ED for further evaluation.  Chest x-ray done in September 2024 was negative acute    -Prescription drug monitoring program review:     N/A    - Chronic or social conditions impacting patient care (Social Determinants of Health): None          Orders placed during this visit:  Orders Placed This Encounter   Procedures    Respiratory Panel PCR w/COVID-19(SARS-CoV-2) KEV/JA/AMNA/PAD/COR/MARTHA In-House, NP Swab in UTM/VTM, 2 HR TAT - Swab, Nasopharynx    [place] Draw    Comprehensive Metabolic Panel    BNP    High Sensitivity Troponin T    CBC Auto Differential    High Sensitivity Troponin T 1Hr    NPO Diet NPO Type: Strict NPO    Undress & Gown    Continuous Pulse Oximetry    Vital Signs    Oxygen Therapy- Nasal Cannula; Titrate 1-6 LPM Per SpO2; 90 - 95%    ECG 12 Lead ED Triage Standing Order; SOA    Insert Peripheral IV    CBC & Differential    Green Top (Gel)    Lavender Top    Gold Top - SST    Light Blue Top         Additional orders considered but not ordered:          Differential diagnosis includes, but is not limited to:    URI, viral syndrome, pneumonia      Independent interpretation of labs, radiology studies, and discussions with consultants:  ED Course as of 02/11/25 0132   Mon Feb 10, 2025   1949 BP: 162/98 [WH]   1949 Temp: 98 °F (36.7 °C) []   1949 Heart Rate(!): 123 [WH]   1949 Resp: 18 [WH]   1949 SpO2: 100 % []   1949 proBNP: <36.0 []   1949 HS Troponin T: 7 []   1949 Glucose(!): 128 [WH]   1949 BUN: 7 [WH]   1949 Creatinine(!): 1.02 [WH]   1949 Potassium(!): 3.4 [WH]   1949 WBC: 8.53 [WH]   1949 Hemoglobin: 13.0 []   1952 Heart  rate is currently 89 [WH]   2146 HS Troponin T: <6 [WH]   2210 Coronavirus NL63(!): Detected [WH]   2301 Patient is resting and breathing comfortably.  Heart rate is in the 80s.  Oxygen saturation however send on room air.  Test results and diagnoses were discussed with her.  She will be discharged with a prescription for cough medication.  Return precautions were discussed.  All questions were answered. [WH]   2311 MDM: Patient presented to the ED with flulike symptoms and palpitations.  She was not febrile, tachycardic, tachypneic, or hypoxic.  Chest x-ray did not show any evidence of pneumonia.  Respiratory panel was positive for non-COVID coronavirus.  Patient takes Eliquis and has not missed any recent doses so I have low suspicion of PE.  Plan is for symptomatic treatment with cough medication. [WH]      ED Course User Index  [WH] Brendan Varela MD         COMPLEXITY OF CARE        DIAGNOSIS  Final diagnoses:   Coronavirus infection   Palpitations   Chronic anticoagulation         DISPOSITION  DISCHARGE    Patient discharged in stable condition.    Reviewed implications of results, diagnosis, meds, responsibility to follow up, warning signs and symptoms of possible worsening, potential complications and reasons to return to ER, including worsening/persistent symptoms, fever, chest pain, trouble breathing, or other concern.    Patient/Family voiced understanding of above instructions.    Discussed plan for discharge, as there is no emergent indication for admission. Patient referred to primary care provider for BP management due to today's BP. Pt/family is agreeable and understands need for follow up and repeat testing.  Pt is aware that discharge does not mean that nothing is wrong but it indicates no emergency is present that requires admission and they must continue care with follow-up as given below or physician of their choice.     FOLLOW-UP  Servando Gao MD  [address] [facility]  [address]  [facility] [id number] [phone]    Schedule an appointment as soon as possible for a visit   If symptoms persist         Medication List        New Prescriptions      guaifenesin-dextromethorphan 600-30 mg  MG tablet sustained-release 12 hour  Take 1 tablet by mouth 2 (Two) Times a Day As Needed (Cough).            Changed      busPIRone 10 MG tablet  Commonly known as: BUSPAR  Take 1 tablet by mouth 2 (Two) Times a Day.  What changed: when to take this     tiZANidine 4 MG tablet  Commonly known as: ZANAFLEX  Take 1 tablet by mouth Every 8 (Eight) Hours As Needed for Muscle Spasms (Do not drive).  What changed: how much to take               Where to Get Your Medications        These medications were sent to [place], [place] - [address] [facility] [phone] [facility] [phone]   [address] [place] [id number]      Phone: [phone]   guaifenesin-dextromethorphan 600-30 mg  MG tablet sustained-release 12 hour                   Latest Documented Vital Signs:  AS OF 01:32 EST VITALS:    BP - 143/88  HR - 89  TEMP - 98.3 °F (36.8 °C)  O2 SATS - 100%            --    Please note that portions of this were completed with a voice recognition program.       Note Disclaimer: At [facility], we believe that sharing information builds trust and better relationships. You are receiving this note because you are receiving care at [facility] or recently visited. It is possible you will see health information before a provider has talked with you about it. This kind of information can be easy to misunderstand. To help you fully understand what it means for your health, we urge you to discuss this note with your provider.             Brendan Varela MD  02/11/25 0132

## (undated) DEVICE — ADAPT CLN BIOGUARD AIR/H2O DISP

## (undated) DEVICE — KT ORCA ORCAPOD DISP STRL

## (undated) DEVICE — CANN O2 ETCO2 FITS ALL CONN CO2 SMPL A/ 7IN DISP LF

## (undated) DEVICE — BLCK/BITE BLOX W/DENTL/RIM W/STRAP 54F

## (undated) DEVICE — LN SMPL CO2 SHTRM SD STREAM W/M LUER

## (undated) DEVICE — SENSR O2 OXIMAX FNGR A/ 18IN NONSTR

## (undated) DEVICE — TUBING, SUCTION, 1/4" X 10', STRAIGHT: Brand: MEDLINE

## (undated) DEVICE — FRCP BX RADJAW4 NDL 2.8 240CM LG OG BX40